# Patient Record
Sex: FEMALE | Race: WHITE | URBAN - METROPOLITAN AREA
[De-identification: names, ages, dates, MRNs, and addresses within clinical notes are randomized per-mention and may not be internally consistent; named-entity substitution may affect disease eponyms.]

---

## 2017-01-04 ENCOUNTER — VIRTUAL VISIT (OUTPATIENT)
Dept: EDUCATION SERVICES | Facility: CLINIC | Age: 67
End: 2017-01-04
Payer: MEDICARE

## 2017-01-04 ENCOUNTER — TELEPHONE (OUTPATIENT)
Dept: EDUCATION SERVICES | Facility: CLINIC | Age: 67
End: 2017-01-04

## 2017-01-04 PROCEDURE — 99207 ZZC NO BILLABLE SERVICE THIS VISIT: CPT

## 2017-01-04 NOTE — TELEPHONE ENCOUNTER
Patient called in and requested a call back to review blood glucose values.    Keiry Marcus RN  BSN CDE      Call out to patient.  She reports that she is now cancer free as there was only a small amount of cancer and it was removed.  She does need to see a a heart doctor however.  She reports that she is home and doing fine.  Trying to walk 4 times per day.  Current insulin doses are Toujeo 0-0-0-94 and Humalog 25-25-25-0.  Reports her BG values as:    12/29  224, ---,   206,---  12/30  188, 165, 115, ---  12/31  142, 180, 202, ---  1/1      202, 226, ---,---  1/2      ---,   ----, 188,---  1/3      ---, ----,   ----,  ---  1/4      ---,---,     255,---   Patient reports she ate a large meatloaf sandwich at 12:00 .    50% of patient blood glucose values are 200 or above.  Recommend to increase patient Humalog dose up to 27-27-27-0 and Toujeo 0-0-0-96.  Patient verbalized understanding and will call in her BG values for review again next week.    Keiry CORONELN CDE

## 2017-01-05 ENCOUNTER — TELEPHONE (OUTPATIENT)
Dept: FAMILY MEDICINE | Facility: CLINIC | Age: 67
End: 2017-01-05

## 2017-01-05 ENCOUNTER — TELEPHONE (OUTPATIENT)
Dept: ONCOLOGY | Facility: CLINIC | Age: 67
End: 2017-01-05

## 2017-01-05 DIAGNOSIS — F41.9 ANXIETY: Primary | ICD-10-CM

## 2017-01-05 RX ORDER — LORAZEPAM 0.5 MG/1
0.5 TABLET ORAL EVERY 8 HOURS PRN
Qty: 20 TABLET | Refills: 0 | Status: SHIPPED | OUTPATIENT
Start: 2017-01-05 | End: 2017-01-16

## 2017-01-05 NOTE — TELEPHONE ENCOUNTER
"Clinic Action Needed:No  Reason for Call:\"I had a total hysterectomy a week ago because of cancer\".  Caller is reporting that she is having high anxiety and wanted to speak to provider about getting medicaiton to \"take the edge off\". Paged on call provider for [facility] to speak to caller at [phone].  Rotating resident pager was paged via [facility] page  at 4:16 pm.     Routed to: Not routed.    Rema Wick RN  [facility]        "

## 2017-01-05 NOTE — TELEPHONE ENCOUNTER
Reason for call: Medication   If this is a refill request, has the caller requested the refill from the pharmacy already? No  Will the patient be using a [facility]? No  Name of the pharmacy and phone number for the current request: [id number] [place] [address]    Name of the medication requested: na    Other request: Pt is 1 week post op/Hysterectomy and is having anxiety issues. Would like something to help with this. Please call with next steps.    Phone Number Pt can be reached at: Home number on file [phone] (home)  Best Time: anytime  Can we leave a detailed message on this number? YES

## 2017-01-05 NOTE — TELEPHONE ENCOUNTER
"Patient is 1 week post op/Hysterectomy and is having anxiety issues. Would like something to help with anxiety.   Patient called [facility] on 1/4. See note below. No further documentation noted. Unknown if on call provider responded.     Routing to provider to please advise.  Herberth Maki RN    Per note from 1/4:  \"Clinic Action Needed:No  Reason for Call:\"I had a total hysterectomy a week ago because of cancer\".  Caller is reporting that she is having high anxiety and wanted to speak to provider about getting medicaiton to \"take the edge off\". Paged on call provider for [facility] to speak to caller at [phone].  Rotating resident pager was paged via [facility] page  at 4:16 pm.     Routed to: Not routed.    Rema Wick RN  [facility]\"      "

## 2017-01-06 ENCOUNTER — TRANSFERRED RECORDS (OUTPATIENT)
Dept: HEALTH INFORMATION MANAGEMENT | Facility: CLINIC | Age: 67
End: 2017-01-06

## 2017-01-06 NOTE — TELEPHONE ENCOUNTER
Pt states spoke to psychiatrist and was told to take Benadryl.  FYI to .  Clementine Figueredo RN

## 2017-01-06 NOTE — TELEPHONE ENCOUNTER
This is a patient with schizophrenia and I am uncomfortable making treatment decisions regarding mental health issues for this patient. I can provide some emergency ativan for anxiety but this is not a suitable long term plan for this patient and I request that her psychiatrist  Be notified of this situation and help deal with alternative choices for treatment.    See prescription     Mateus Roberson MD

## 2017-01-06 NOTE — TELEPHONE ENCOUNTER
Lets please notify patient of this information. 25-50 milligrams 2-3 times a day liang Roberson MD

## 2017-01-09 NOTE — TELEPHONE ENCOUNTER
This was just an FYI to Dr. Roberson.   Psychiatry already addressed this per note below    Harshal Hayward RN

## 2017-01-09 NOTE — TELEPHONE ENCOUNTER
Ativan prescription faxed to [facility] at [phone].  Camila Rudolph,

## 2017-01-10 ENCOUNTER — TELEPHONE (OUTPATIENT)
Dept: FAMILY MEDICINE | Facility: CLINIC | Age: 67
End: 2017-01-10

## 2017-01-10 ENCOUNTER — TELEPHONE (OUTPATIENT)
Dept: ONCOLOGY | Facility: CLINIC | Age: 67
End: 2017-01-10

## 2017-01-10 NOTE — TELEPHONE ENCOUNTER
Pt called our clinic stating that fell out of bed on Saturday and developed severe abdominal pain. She was evaluated in ED and was diagnosed with UTI. She is on abx currently and is taking pain meds. Today she is having increased abdominal pain. RN discussed these symptoms with Dr Cerda and she advised pt to go to ED to be reevaluated. Pt was reluctant to do that, states the roads are bad and she already been to ED. RN reinstated that we cannot help her over the phone and she needs to be seen and evaluated.

## 2017-01-10 NOTE — TELEPHONE ENCOUNTER
Reason for Call:  Medication or medication refill:    Do you use a [facility]?  Name of the pharmacy and phone number for the current request:  Patient will  [facility]    Name of the medication requested: Norco that was prescribed yesterday by ED    Other request: Patient states that she was seen in ED yesterday due to so much pain. Patient was seen with home care nurse and was advised to go to the Hospital. Patient refuses to go but daughter insisting on her going.     Can we leave a detailed message on this number? YES    Phone number patient can be reached at: Home number on file [phone] (home)    Best Time: now    Call taken on 1/10/2017 at 11:56 AM by Michelle Quinn

## 2017-01-10 NOTE — TELEPHONE ENCOUNTER
"Noted that patient had also asked oncology for a refill but was advised to go to ED for severe pain    Call Documentation      Rose John RN at 1/10/2017  8:39 AM      Status: Signed         Expand All Collapse All    Pt called our clinic stating that fell out of bed on Saturday and developed severe abdominal pain. She was evaluated in ED and was diagnosed with UTI. She is on abx currently and is taking pain meds. Today she is having increased abdominal pain. RN discussed these symptoms with Dr Cerda and she advised pt to go to ED to be reevaluated. Pt was reluctant to do that, states the roads are bad and she already been to ED. RN reinstated that we cannot help her over the phone and she needs to be seen and evaluated.            Marina Valdez is a 66 year old female who calls with severe pain in her left lower abd    NURSING ASSESSMENT:  Description:  Per patient, she developed left lower abd pain after falling out of bed after a violent dream over the weekend. She was seen at [place] ED and Diagnosed with a UTI and sent home with abx. Per patient, a CT was completed and was unremarkable. She does not have pain when sitting/lying still but the pain is severe when she stands up/moves. Pain med is ineffective. She is unable to come in for an appointment d/t \"pain is so severe I cannot even get into a car.\"  Onset/duration:  Over the weekend  Precip. factors:    Associated symptoms:  See above  Improves/worsens symptoms:    Pain scale (0-10)   10/10  LMP/preg/breast feeding:    Last exam/Treatment:  11/11/16  Allergies:   Allergies   Allergen Reactions     Other [Seasonal Allergies]      Hayfever       MEDICATIONS:   Taking medication(s) as prescribed? Yes  Taking over the counter medication(s?) N/A  Any medication side effects? No significant side effects    Any barriers to taking medication(s) as prescribed?  No  Medication(s) improving/managing symptoms?  No  Medication reconciliation completed: " N/A      NURSING PLAN: Nursing advice to patient to go to ED    RECOMMENDED DISPOSITION:  To ED, another person to drive - or call an ambulance  Will comply with recommendation: Yes  If further questions/concerns or if symptoms do not improve, worsen or new symptoms develop, call your PCP or [facility] as soon as possible.      Guideline used:  Telephone Triage Protocols for Nurses, Fourth Edition, Jessica Hayward RN

## 2017-01-12 NOTE — TELEPHONE ENCOUNTER
Reason for call: Symptom   Symptom or request: Severe pain in left lower abdomin/when standing    Duration (how long have symptoms been present): Since 01/07/2017  Have you been treated for this before? Yes    Additional comments: Cannot drive to come in would like to discontinue Cephalexing 500mg. Patient would like something else to take care of the UTI.    Phone Number Pt can be reached at: Home number on file [phone] (home)  Best Time: TRIAGE - RN  Can we leave a detailed message on this number? YES

## 2017-01-12 NOTE — TELEPHONE ENCOUNTER
"Spoke with patient.   She stated that she ended up going to [facility] ED but her pain had gone away when she was there so she was sent home.  She is back home now and continues to be in severe pain.  Noted that she has an appointment scheduled for today with Dr. Roberson  Patient stated that she believes she was put on the wrong abx for her UTI which is making her symptoms worse \"I have a UTI and a bruised hip.\"  She stated that she is in severe pain and unable to get to clinic, \"I would have to take an ambulance.\"  Patient stated that the pain is so bad that she is unable to get up.  She seemed to want something done over the phone. Explained to her that she needs further assessment in person as her symptoms are severe   Patient continued to yell on the phone, \"are you stupid lady?\"  Patient's daughter got on the phone and agreed that patient needs to go back to the ED.  Daughter will ask the ED to admit her d/t severe pain   Harshal Hayward RN    "

## 2017-01-12 NOTE — TELEPHONE ENCOUNTER
Patient's other daughter, Mamta, called back to ask if patient can come in for her 6:30Pm appointment today for severe pain.    Per Dr. Roberson: patient recently had surgery. If pain is severe, she needs to go to ED    Mamta updated.    Harshal Hayward RN

## 2017-01-12 NOTE — TELEPHONE ENCOUNTER
Reason for Call:  Other call back    Detailed comments:  Daughter calling. Is there any way she can be seen sooner than 6:30 pm. Marina is in severe pain.     Phone Number Patient can be reached at: Cell number on file:    No relevant phone numbers on file.       Best Time: any    Can we leave a detailed message on this number? YES    Call taken on 1/12/2017 at 11:23 AM by Daisy Ramos

## 2017-01-14 ENCOUNTER — TELEPHONE (OUTPATIENT)
Dept: FAMILY MEDICINE | Facility: CLINIC | Age: 67
End: 2017-01-14

## 2017-01-14 NOTE — TELEPHONE ENCOUNTER
Pt calling wanting to inform Dr. Roberson she is currently hospitalized and has been taken off her insulin.     Reason for call: Other   Patient called regarding (reason for call): call back  Additional comments:   Phone Number Pt can be reached at: Home number on file [phone] (home)  Best Time:   Can we leave a detailed message on this number? YES  Jessie Busch  Central Scheduling

## 2017-01-16 ENCOUNTER — NURSING HOME VISIT (OUTPATIENT)
Dept: GERIATRICS | Facility: CLINIC | Age: 67
End: 2017-01-16
Payer: MEDICARE

## 2017-01-16 VITALS
DIASTOLIC BLOOD PRESSURE: 68 MMHG | HEART RATE: 78 BPM | OXYGEN SATURATION: 97 % | SYSTOLIC BLOOD PRESSURE: 98 MMHG | RESPIRATION RATE: 16 BRPM | TEMPERATURE: 97.9 F | BODY MASS INDEX: 53.43 KG/M2 | WEIGHT: 293 LBS

## 2017-01-16 DIAGNOSIS — M25.552 HIP PAIN, LEFT: Primary | ICD-10-CM

## 2017-01-16 DIAGNOSIS — E78.5 HYPERLIPIDEMIA, UNSPECIFIED HYPERLIPIDEMIA TYPE: ICD-10-CM

## 2017-01-16 DIAGNOSIS — E11.22 TYPE 2 DIABETES MELLITUS WITH STAGE 3 CHRONIC KIDNEY DISEASE, WITH LONG-TERM CURRENT USE OF INSULIN (H): ICD-10-CM

## 2017-01-16 DIAGNOSIS — N18.30 CKD (CHRONIC KIDNEY DISEASE) STAGE 3, GFR 30-59 ML/MIN (H): ICD-10-CM

## 2017-01-16 DIAGNOSIS — E66.01 MORBID OBESITY DUE TO EXCESS CALORIES (H): ICD-10-CM

## 2017-01-16 DIAGNOSIS — I10 BENIGN ESSENTIAL HYPERTENSION: ICD-10-CM

## 2017-01-16 DIAGNOSIS — G47.00 INSOMNIA, UNSPECIFIED TYPE: ICD-10-CM

## 2017-01-16 DIAGNOSIS — F20.9 SCHIZOPHRENIA, UNSPECIFIED TYPE (H): ICD-10-CM

## 2017-01-16 DIAGNOSIS — N39.0 URINARY TRACT INFECTION, SITE UNSPECIFIED: ICD-10-CM

## 2017-01-16 DIAGNOSIS — Z79.4 TYPE 2 DIABETES MELLITUS WITH STAGE 3 CHRONIC KIDNEY DISEASE, WITH LONG-TERM CURRENT USE OF INSULIN (H): ICD-10-CM

## 2017-01-16 DIAGNOSIS — A60.00 RECURRENT GENITAL HSV (HERPES SIMPLEX VIRUS) INFECTION: ICD-10-CM

## 2017-01-16 DIAGNOSIS — R06.00 DYSPNEA, UNSPECIFIED TYPE: ICD-10-CM

## 2017-01-16 DIAGNOSIS — N18.30 TYPE 2 DIABETES MELLITUS WITH STAGE 3 CHRONIC KIDNEY DISEASE, WITH LONG-TERM CURRENT USE OF INSULIN (H): ICD-10-CM

## 2017-01-16 DIAGNOSIS — Z90.710 HISTORY OF HYSTERECTOMY: ICD-10-CM

## 2017-01-16 DIAGNOSIS — G47.33 OBSTRUCTIVE SLEEP APNEA: ICD-10-CM

## 2017-01-16 PROCEDURE — 99207 ZZC CDG-CORRECTLY CODED, REVIEWED AND AGREE: CPT | Performed by: NURSE PRACTITIONER

## 2017-01-16 PROCEDURE — 99310 SBSQ NF CARE HIGH MDM 45: CPT | Performed by: NURSE PRACTITIONER

## 2017-01-16 RX ORDER — CEPHALEXIN 500 MG/1
500 CAPSULE ORAL 4 TIMES DAILY
COMMUNITY
End: 2017-02-01

## 2017-01-16 RX ORDER — IBUPROFEN 400 MG/1
400 TABLET, FILM COATED ORAL 3 TIMES DAILY PRN
COMMUNITY
End: 2017-02-01

## 2017-01-16 RX ORDER — ACETAMINOPHEN 500 MG
500 TABLET ORAL EVERY 4 HOURS PRN
COMMUNITY
End: 2017-01-16 | Stop reason: ALTCHOICE

## 2017-01-16 RX ORDER — HYDROCODONE BITARTRATE AND ACETAMINOPHEN 5; 325 MG/1; MG/1
1-2 TABLET ORAL EVERY 4 HOURS PRN
COMMUNITY
End: 2017-02-08

## 2017-01-16 RX ORDER — DIPHENHYDRAMINE HCL 25 MG
25 TABLET ORAL AT BEDTIME
COMMUNITY
End: 2019-07-12

## 2017-01-16 RX ORDER — ZIPRASIDONE HYDROCHLORIDE 80 MG/1
160 CAPSULE ORAL EVERY MORNING
COMMUNITY
End: 2019-07-16

## 2017-01-16 RX ORDER — AMOXICILLIN 250 MG
2 CAPSULE ORAL 2 TIMES DAILY PRN
COMMUNITY
End: 2019-07-15

## 2017-01-16 NOTE — PROGRESS NOTES
[facility]  PRIMARY CARE PROVIDER AND CLINIC:  Huigwen Mateus [facility] [address] [facility] / [place]  Chief Complaint   Patient presents with     Hospital F/U     HPI:    Marina Valdez is a 66 year old  (1950),admitted to the [facility] from [facility] .  Hospital stay 1/12/2017 through 1/14/2017.  Admitted to this facility for  rehab, medical management and nursing care. Hospital course per review of progress notes:    This is a 66-year-old female, with a past medical history significant for type 2 diabetes mellitus, hyperlipidemia, hypertension, schizophrenia, morbid obesity, obstructive sleep apnea and chronic kidney disease stage III, who was admitted to [facility] with left hip and groin pain after a fall out of bed when having a bad dream. Of note, was evaluated in the ED on 1/9/17 and 1/10/17 for the same left hip and groin pain, but declined placement.During this hospitalization, a left hip MRI revealed no acute findings. Previous pelvic CT on 1/10/17 revealed no acute findings. Treated for positive urinalysis with Cephalexin on 1/9/17. Discharged to TCU for ongoing physical rehabilitation.     Current issues are:        Complains of left hip pain. 2/10 at rest and 10/10 with activity. Questions if pain medication can be adjusted. Lives in an apartment on the 2nd floor with no elevator. Was independent with ambulation prior to hospitalization. Denies shortness of breath, chest pain or abdominal pain.    CODE STATUS/ADVANCE DIRECTIVES DISCUSSION:   CPR/Full code   Patient's living condition: lives alone    ALLERGIES:Other  PAST MEDICAL HISTORY:  has a past medical history of Diabetes; HTN (hypertension); Schizophrenia (H); CORDELL (obstructive sleep apnea); Bipolar affect, depressed (H); Endometrial cancer (H); Morbid obesity due to excess calories (H); and Renal disease.  PAST SURGICAL HISTORY:  has past surgical history that  includes Oophorectomy; laparoscopy; Wrist surgery; cataract; DaVINCI hysterectomy total, bilateral salpingo-oophorectomy, combined (N/A, 12/29/2016); and Cystoscopy (N/A, 12/29/2016).  FAMILY HISTORY: family history includes Blood Disease in her father; Breast Cancer in her paternal grandmother and sister; CEREBROVASCULAR DISEASE in her maternal grandmother; DIABETES in her mother; HEART DISEASE in her brother, maternal grandfather, maternal grandmother, and mother; Hypertension in her father and mother; Kidney Cancer in her daughter; Ovarian Cancer in her paternal aunt; Psychotic Disorder in her mother; Uterine Cancer in her daughter and sister.  SOCIAL HISTORY:  reports that she quit smoking about 27 years ago. Her smoking use included Cigarettes. She smoked 1.00 pack per day for 0 years. She has never used smokeless tobacco. She reports that she does not drink alcohol or use illicit drugs.    Post Discharge Medication Reconciliation Status: discharge medications reconciled and changed, per note/orders (see AVS).  Current Outpatient Prescriptions   Medication Sig Dispense Refill     diphenhydrAMINE (BENADRYL) 25 MG tablet Take 25 mg by mouth At Bedtime And 25 mg nightly as needed       cholecalciferol 1000 UNITS TABS Take 1 tablet by mouth daily       ziprasidone (GEODON) 80 MG capsule Take 160 mg by mouth every morning And 80 mg daily at 10 AM, and 80 mg nightly as needed       psyllium 0.52 G capsule Take 1 capsule by mouth daily       acetaminophen (TYLENOL) 500 MG tablet Take 500 mg by mouth every 4 hours as needed for pain       cephALEXin (KEFLEX) 500 MG capsule Take 500 mg by mouth 4 times daily       HYDROcodone-acetaminophen (NORCO) 5-325 MG per tablet Take 1-2 tablets by mouth every 4 hours as needed for moderate to severe pain Give 1 tablet for pain 4-7 and give 2 tablets for pain 8-10       ibuprofen (ADVIL/MOTRIN) 400 MG tablet Take 400 mg by mouth 3 times daily as needed for pain       senna-docusate  (SENOKOT-S;PERICOLACE) 8.6-50 MG per tablet Take 2 tablets by mouth 2 times daily as needed for constipation       insulin lispro (HUMALOG KWIKPEN) 100 UNIT/ML injection Inject 15 Units Subcutaneous 3 times daily (before meals)       insulin glargine U-300 (TOUJEO SOLOSTAR) 300 UNIT/ML injection Inject 94 Units Subcutaneous At Bedtime       ONE TOUCH VERIO IQ test strip TEST THREE TIMES DAILY OR AS DIRECTED 100 strip 9     valsartan-hydrochlorothiazide (DIOVAN-HCT) 160-25 MG per tablet TAKE 1 TABLET BY MOUTH DAILY 90 tablet 1     B-D U/F 31G X 8 MM insulin pen needle USE FOUR TIMES DAILY AS DIRECTED WITH LANTUS AND PREMEAL INSULIN 200 each 3     blood glucose monitoring (ONE TOUCH DELICA) lancets Use to test blood sugars 3 times daily or as directed. Qty of 1 box= 100 lancets 1 Box 1     order for DME Equipment being ordered: corset to prevent compulsive skin picking of abdomen 1 Device 0     order for DME Equipment being ordered: Lift Chair 1 each 0     valACYclovir (VALTREX) 500 MG tablet TAKE ONE TABLET BY MOUTH ONE TIME DAILY 90 tablet 1     pravastatin (PRAVACHOL) 40 MG tablet TAKE ONE TABLET BY MOUTH ONE TIME DAILY 90 tablet 3     nystatin (MYCOSTATIN) 383693 UNIT/GM POWD Apply 1 g topically 3 times daily as needed 120 g 3     aspirin 81 MG tablet Take 1 tablet (81 mg) by mouth daily 100 tablet 3     ORDER FOR DME Equipment being ordered: CPAP machine set at 15 pressure, heated humidifier, supplies: mask and tubing ( supplies) 1 Device 1     ORDER FOR DME Equipment being ordered: CPAP and associated supplies and tubing 1 Device 0     PAROXETINE HCL 40 MG OR TABS Take 80 mg by mouth daily. 80mg=2 tablets.          ROS:  4 point ROS including Respiratory, CV, GI and , other than that noted in the HPI,  is negative    Exam:  BP 98/68 mmHg  Pulse 78  Temp(Src) 97.9  F (36.6  C)  Resp 16  Wt 321 lb 1.6 oz (145.65 kg)  SpO2 97%  LMP 03/24/2010  GENERAL APPEARANCE:  Alert, in no distress  ENT:  Mouth and  posterior oropharynx normal, moist mucous membranes  EYES:  EOM, conjunctivae, lids, pupils and irises normal  RESP:  respiratory effort and palpation of chest normal, lungs clear to auscultation , no respiratory distress  CV:  Palpation and auscultation of heart done , regular rate and rhythm, no murmur, rub, or gallop  ABDOMEN:  normal bowel sounds, soft, nontender, no hepatosplenomegaly or other masses  M/S:   Active movement of bilateral upper and lower extremities.  SKIN:  Inspection of skin and subcutaneous tissue baseline, Palpation of skin and subcutaneous tissue baseline. Abdominal laparoscopic incisions covered with dressing.  NEURO:   Cranial nerves 2-12 are normal tested and grossly at patient's baseline  PSYCH:  affect and mood normal    Lab/Diagnostic data:  WBC      9.3   12/19/2016  RBC     4.60   12/19/2016  HGB     14.1   12/19/2016  HCT     43.0   12/19/2016  MCV       94   12/19/2016  MCH     30.7   12/19/2016  MCHC     32.8   12/19/2016  RDW     14.5   12/19/2016  PLT      150   12/19/2016    NA      137   11/11/2016   POTASSIUM      3.7   11/11/2016  DEBBIE      9.6   11/11/2016  CO2       24   11/11/2016  BUN       18   11/11/2016  CR     1.31   11/11/2016  GLC      245   11/11/2016    ASSESSMENT/PLAN:  Left Hip Pain. Secondary to fall out of bed. MRI and CT negative for acute findings. Hydrocodone-Acetaminophen and Ibuprofen ordered for pain control. Monitor Ibuprofen use carefully with CKD. Recommended patient use 2 Hydrocodone-Acetaminophen tablets if pain 10/10 as has previously been using just 1 tablet. Physical and Occupational Therapy ordered for deconditioning.     Urinary Tract Infection. No urine culture available to review. Continue Cephalexin as ordered for a total of 10 days of treatment. Last date to be administered 1/19/17.     Grade I Endometrial Adenocarcinoma S/P Robotic Total Laparoscopic Hysterectomy, Left Salpingo-oophorectomy, Lysis of Adhesions and Cystoscopy on 12/29/16.  Follow-up with Dr. Cerda on 1/24/17 as scheduled.    Type 2 Diabetes Mellitus. Last A1C 8.7 on 1/10/17. Continue Glargine and Lispro as ordered. Monitor accuchecks while in TCU and adjust treatment accordingly.     Dyspnea. Constant. Patient reports this has been going on for some time. May be a component of deconditioning. Seen by  at [facility] on 1/6/17. Dietary changes, exercise and an echocardiogram were recommended with follow-up in 2 months. Needs to re-schedule echocardiogram outpatient.    Hyperlipidemia/Hypertension. Monitor blood pressure daily. Continue Valsartan-Hydrochlorothiazide and Pravastatin as ordered.    Schizophrenia. Followed by Dr. Juanjo Morrison at [facility]. Continue Ziprasidone as ordered. Also on Paroxetine.     Morbid Obesity. Last BMI on file 55.12 on 12/19/16. Dietary to follow while in TCU.     Recurrent Genital HSV. Continue Valacyclovir as ordered.    Obstructive Sleep Apnea. Continue CPAP at home settings.    Chronic Kidney Disease, Stage III. Baseline Creatinine low 1s. Last Creatinine 1.25 on 1/10/17. Repeat BMP to ensure stability.      Insomnia. Continue Diphenhydramine as ordered for now. May benefit from alternate medication due to risk of falls.     Information reviewed:  Medications, vital signs, orders, nursing notes, problem list, hospital information. Total time spent with patient visit was 45 min including patient visit and review of past records. Greater than 50% of total time spent with counseling and coordinating care.    Electronically signed by:  DENNY Rojo CNP

## 2017-01-17 VITALS
HEART RATE: 94 BPM | TEMPERATURE: 98.1 F | DIASTOLIC BLOOD PRESSURE: 76 MMHG | SYSTOLIC BLOOD PRESSURE: 119 MMHG | OXYGEN SATURATION: 94 % | RESPIRATION RATE: 18 BRPM

## 2017-01-18 ENCOUNTER — NURSING HOME VISIT (OUTPATIENT)
Dept: GERIATRICS | Facility: CLINIC | Age: 67
End: 2017-01-18
Payer: MEDICARE

## 2017-01-18 DIAGNOSIS — Z79.4 TYPE 2 DIABETES MELLITUS WITH STAGE 3 CHRONIC KIDNEY DISEASE, WITH LONG-TERM CURRENT USE OF INSULIN (H): ICD-10-CM

## 2017-01-18 DIAGNOSIS — N18.30 TYPE 2 DIABETES MELLITUS WITH STAGE 3 CHRONIC KIDNEY DISEASE, WITH LONG-TERM CURRENT USE OF INSULIN (H): ICD-10-CM

## 2017-01-18 DIAGNOSIS — E78.5 HYPERLIPIDEMIA, UNSPECIFIED HYPERLIPIDEMIA TYPE: ICD-10-CM

## 2017-01-18 DIAGNOSIS — N18.30 CKD (CHRONIC KIDNEY DISEASE) STAGE 3, GFR 30-59 ML/MIN (H): ICD-10-CM

## 2017-01-18 DIAGNOSIS — G47.33 OSA (OBSTRUCTIVE SLEEP APNEA): ICD-10-CM

## 2017-01-18 DIAGNOSIS — R53.81 PHYSICAL DECONDITIONING: ICD-10-CM

## 2017-01-18 DIAGNOSIS — N39.0 URINARY TRACT INFECTION WITHOUT HEMATURIA, SITE UNSPECIFIED: ICD-10-CM

## 2017-01-18 DIAGNOSIS — E11.22 TYPE 2 DIABETES MELLITUS WITH STAGE 3 CHRONIC KIDNEY DISEASE, WITH LONG-TERM CURRENT USE OF INSULIN (H): ICD-10-CM

## 2017-01-18 DIAGNOSIS — C54.1 ENDOMETRIAL CANCER (H): ICD-10-CM

## 2017-01-18 DIAGNOSIS — F20.9 SCHIZOPHRENIA, UNSPECIFIED TYPE (H): ICD-10-CM

## 2017-01-18 DIAGNOSIS — A60.00 RECURRENT GENITAL HSV (HERPES SIMPLEX VIRUS) INFECTION: ICD-10-CM

## 2017-01-18 DIAGNOSIS — I10 BENIGN ESSENTIAL HYPERTENSION: ICD-10-CM

## 2017-01-18 DIAGNOSIS — R10.32 GROIN PAIN, LEFT: Primary | ICD-10-CM

## 2017-01-18 PROCEDURE — 99306 1ST NF CARE HIGH MDM 50: CPT | Performed by: INTERNAL MEDICINE

## 2017-01-18 PROCEDURE — 99207 ZZC CDG-CORRECTLY CODED, REVIEWED AND AGREE: CPT | Performed by: INTERNAL MEDICINE

## 2017-01-18 NOTE — PROGRESS NOTES
"[facility]  INITIAL VISIT NOTE  January 18, 2017    PRIMARY CARE PROVIDER AND CLINIC:  Mateus Roberson [facility] [address] [facility] / [place]IS*    Chief Complaint   Patient presents with     Hospital F/U       HPI:    Marina Valdez is a 66 year old  (1950) female who was seen at [facility] on January 18, 2017 for an initial visit. Medical history is notable for schizophrenia, DM II, HTN, CORDELL and endometrial cancer s/p ALFONSO/BSO (12/29/16). She was hospitalized at [facility] from 1/12/17 to 1/14/17 where she presented with left hip and groin pain. She was seen in the ER on 1/9 and 1/10 for same chief complaint. CT and MRI both negative for acute pathology. She was admitted to this facility for medical management and rehab.     Today, Ms. Valdez is seen in her room. She tells me the pain is getting better. She is very happy about this. Says she still needs some \"pain pills\", but that when they wear off the pain is much less than it had been. No chest pain or dyspnea. No abdominal pain. Says she is sleeping well and points to her CPAP. Working with therapies.     CODE STATUS:   CPR/Full code     ALLERGIES:     Allergies   Allergen Reactions     Other [Seasonal Allergies]      Hayfever       PAST MEDICAL HISTORY:   Past Medical History   Diagnosis Date     Diabetes      HTN (hypertension)      Schizophrenia (H)      CORDELL (obstructive sleep apnea)      uses CPAP     Bipolar affect, depressed (H)      Endometrial cancer (H)      Morbid obesity due to excess calories (H)      Renal disease        PAST SURGICAL HISTORY:   Past Surgical History   Procedure Laterality Date     Oophorectomy       right     Laparoscopy       for endometriosis     Wrist surgery       right     Cataract       bilateral     Davinci hysterectomy total, bilateral salpingo-oophorectomy, combined N/A 12/29/2016     Procedure: COMBINED DAVINCI HYSTERECTOMY TOTAL, SALPINGO-OOPHORECTOMY;  Surgeon: " Franca Rousseau MD;  Location: [facility] OR     Cystoscopy N/A 12/29/2016     Procedure: CYSTOSCOPY;  Surgeon: Franca Rousseau MD;  Location: [facility] OR       FAMILY HISTORY:   Family History   Problem Relation Age of Onset     HEART DISEASE Mother      DIABETES Mother      Hypertension Mother      Psychotic Disorder Mother      schitzophrenia     Hypertension Father      Blood Disease Father      high iron level     HEART DISEASE Maternal Grandmother      CEREBROVASCULAR DISEASE Maternal Grandmother      HEART DISEASE Maternal Grandfather      Breast Cancer Paternal Grandmother      HEART DISEASE Brother      Breast Cancer Sister      Ovarian Cancer Paternal Aunt      Kidney Cancer Daughter      Uterine Cancer Daughter      Uterine Cancer Sister        SOCIAL HISTORY:   Lives alone     MEDICATIONS:  Current Outpatient Prescriptions   Medication Sig Dispense Refill     diphenhydrAMINE (BENADRYL) 25 MG tablet Take 25 mg by mouth At Bedtime And 25 mg nightly as needed       cholecalciferol 1000 UNITS TABS Take 1 tablet by mouth daily       ziprasidone (GEODON) 80 MG capsule Take 160 mg by mouth every morning And 80 mg daily at 10 AM, and 80 mg nightly as needed       psyllium 0.52 G capsule Take 1 capsule by mouth daily       cephALEXin (KEFLEX) 500 MG capsule Take 500 mg by mouth 4 times daily       HYDROcodone-acetaminophen (NORCO) 5-325 MG per tablet Take 1-2 tablets by mouth every 4 hours as needed for moderate to severe pain Give 1 tablet for pain 4-7 and give 2 tablets for pain 8-10       ibuprofen (ADVIL/MOTRIN) 400 MG tablet Take 400 mg by mouth 3 times daily as needed for pain       senna-docusate (SENOKOT-S;PERICOLACE) 8.6-50 MG per tablet Take 2 tablets by mouth 2 times daily as needed for constipation       insulin lispro (HUMALOG KWIKPEN) 100 UNIT/ML injection Inject 15 Units Subcutaneous 3 times daily (before meals)       insulin glargine U-300 (TOUJEO SOLOSTAR) 300 UNIT/ML injection Inject 94  Units Subcutaneous At Bedtime       ONE TOUCH VERIO IQ test strip TEST THREE TIMES DAILY OR AS DIRECTED 100 strip 9     valsartan-hydrochlorothiazide (DIOVAN-HCT) 160-25 MG per tablet TAKE 1 TABLET BY MOUTH DAILY 90 tablet 1     B-D U/F 31G X 8 MM insulin pen needle USE FOUR TIMES DAILY AS DIRECTED WITH LANTUS AND PREMEAL INSULIN 200 each 3     blood glucose monitoring (ONE TOUCH DELICA) lancets Use to test blood sugars 3 times daily or as directed. Qty of 1 box= 100 lancets 1 Box 1     order for DME Equipment being ordered: corset to prevent compulsive skin picking of abdomen 1 Device 0     order for DME Equipment being ordered: Lift Chair 1 each 0     valACYclovir (VALTREX) 500 MG tablet TAKE ONE TABLET BY MOUTH ONE TIME DAILY 90 tablet 1     pravastatin (PRAVACHOL) 40 MG tablet TAKE ONE TABLET BY MOUTH ONE TIME DAILY 90 tablet 3     nystatin (MYCOSTATIN) 897842 UNIT/GM POWD Apply 1 g topically 3 times daily as needed 120 g 3     aspirin 81 MG tablet Take 1 tablet (81 mg) by mouth daily 100 tablet 3     ORDER FOR DME Equipment being ordered: CPAP machine set at 15 pressure, heated humidifier, supplies: mask and tubing ( supplies) 1 Device 1     ORDER FOR DME Equipment being ordered: CPAP and associated supplies and tubing 1 Device 0     PAROXETINE HCL 40 MG OR TABS Take 80 mg by mouth daily. 80mg=2 tablets.          Post Discharge Medication Reconciliation Status: medication reconcilation previously completed during another office visit.    ROS:  10 point ROS neg other than the symptoms noted above in the HPI.    PHYSICAL EXAM:  /76 mmHg  Pulse 94  Temp(Src) 98.1  F (36.7  C)  Resp 18  SpO2 94%  LMP 03/24/2010  Gen: sitting up in bed, alert, cooperative and in no acute distress  HEENT: normocephalic; oropharynx clear  Card: RRR, S1, S2, no murmurs  Resp: lungs clear to auscultation bilaterally, no crackles or wheezes  GI: abdomen soft, not-tender  MSK: normal muscle tone, no LE edema  Neuro: CX II-XII  grossly in tact; ROM in all four extremities grossly in tact  Psych: alert and oriented to self and general situation; normal affect    LABORATORY/IMAGING DATA:  Reviewed as per Epic    ASSESSMENT/PLAN:    Left Hip and Groin Pain, Improving   Imaging negative for acute pathology. Etiology unclear. She reports pain is getting better.   -- analgesia with Norco 5-325 mg 1-2 tabs q4h PRN, ibuprofen 400 mg TID PRN    UTI, Resolving  Speciation unknown. No fevers or urinary sx.   -- completing course of cephalexin (1/19/17)    DM, Type II  Hgb A1c 8.7. Sugars 130s-220s  -- continues on glargine U-300 94 units qhs and lispro 15 units TID AC  -- follow sugars and adjust insulin as needed    Schizophrenia  Follows with psychiatry.   -- continues on paroxetine 80 mg daily and ziprasidone 160 mg qam, 80 mg at 1000 and 80 mg qhs PRN    HTN  SBPs 110s overall  -- continues on valsartan-HCTZ 160-25 mg daily  -- follow BPs and adjust medications as needed    Endometrial Cancer s/p ALFONSO/BSO (12/29/16)  -- ongoing management per gyn    HLD  -- continues on pravastatin 40 mg daily    CORDELL  -- continues on CPAP with home settings    Recurrent Genital HSV  -- continues on valacyclovir 500 mg daily    CKD, Stage III  Baseline Cr low 1s.   -- avoid nephrotoxic meds  -- periodic BMP    Physical Deconditioning  In setting of hospitalization and underlying medical conditions  -- ongoing PT/OT      Electronically signed by:  Madelin Mendiola MD

## 2017-01-28 ENCOUNTER — TELEPHONE (OUTPATIENT)
Dept: GERIATRICS | Facility: CLINIC | Age: 67
End: 2017-01-28

## 2017-01-28 NOTE — TELEPHONE ENCOUNTER
Patient recently completed treatment for UTI with Keflex. Now once again pain, burning, some hematuria. UA positive, UC pending.     PLAN  Since no antbx allergies and CrCl 99 - start Cipro 500 mg PO BID x 3 days. Stop if UC negative or update provider with sensitivities.     Electronically signed by DENNY Berrios GNP     ADDENDUM  At 10:00 AM notified by pharmacy of significant drug interaction between Cipro and patient's antipsychotic.     Will dc Cipro order and instead start Macrobid 100 mg PO BID x 3 days, rest of order unchanged. Pharmacy will contact facility to let them know.     Electronically signed by DENNY Berrios GNP

## 2017-02-01 ENCOUNTER — NURSING HOME VISIT (OUTPATIENT)
Dept: GERIATRICS | Facility: CLINIC | Age: 67
End: 2017-02-01
Payer: MEDICARE

## 2017-02-01 VITALS
TEMPERATURE: 98 F | WEIGHT: 293 LBS | SYSTOLIC BLOOD PRESSURE: 121 MMHG | RESPIRATION RATE: 20 BRPM | OXYGEN SATURATION: 98 % | BODY MASS INDEX: 53.43 KG/M2 | DIASTOLIC BLOOD PRESSURE: 64 MMHG | HEART RATE: 105 BPM

## 2017-02-01 DIAGNOSIS — I10 BENIGN ESSENTIAL HYPERTENSION: ICD-10-CM

## 2017-02-01 DIAGNOSIS — M25.552 HIP PAIN, LEFT: Primary | ICD-10-CM

## 2017-02-01 PROCEDURE — 99309 SBSQ NF CARE MODERATE MDM 30: CPT | Performed by: NURSE PRACTITIONER

## 2017-02-01 PROCEDURE — 99207 ZZC CDG-CORRECTLY CODED, REVIEWED AND AGREE: CPT | Performed by: NURSE PRACTITIONER

## 2017-02-01 RX ORDER — NYSTATIN 100000 [USP'U]/G
POWDER TOPICAL 2 TIMES DAILY
COMMUNITY
End: 2017-03-10

## 2017-02-01 NOTE — PROGRESS NOTES
[facility]    Chief Complaint   Patient presents with     Nursing Home Acute     HPI:    Marina Valdez is a 66 year old  (1950), who is being seen today for an episodic care visit at [facility]. Today's concern is:    Left Hip/Groin Pain. Denies pain. States she has been working with therapies and is able to go up/down stairs. Has a care conference tomorrow and is hoping to return home soon. Upon review of documentation, has utilized Hydrocodone-Acetaminophen 1 time in the past 4 days. Per review of Physical Therapy documentation, ambulating > 200 feet. Tinetti Score 26/28.    Diabetes Mellitus Type 2. Upon review of blood sugars over the past week, range is as follows:    Breakfast: 122-177  Lunch: 182-274  Dinner: 132-186  Bedtime: 120-193 with an episode of 228    Hypertension. Upon review of blood pressure over the past week, systolic range 107-140. Diastolic range from 64-94.    ALLERGIES: Other  Past Medical, Surgical, Family and Social History reviewed and [facility] in [facility].    Current Outpatient Prescriptions   Medication Sig Dispense Refill     nystatin (MYCOSTATIN) 580953 UNIT/GM POWD Apply topically 2 times daily       insulin glargine U-300 (TOUJEO SOLOSTAR) 300 UNIT/ML injection Inject 96 Units Subcutaneous At Bedtime       diphenhydrAMINE (BENADRYL) 25 MG tablet Take 25 mg by mouth At Bedtime And 25 mg nightly as needed       cholecalciferol 1000 UNITS TABS Take 1 tablet by mouth daily       ziprasidone (GEODON) 80 MG capsule Take 160 mg by mouth every morning And 80 mg daily at 10 AM, and 80 mg nightly as needed       HYDROcodone-acetaminophen (NORCO) 5-325 MG per tablet Take 1-2 tablets by mouth every 4 hours as needed for moderate to severe pain Give 1 tablet for pain 4-7 and give 2 tablets for pain 8-10       senna-docusate (SENOKOT-S;PERICOLACE) 8.6-50 MG per tablet Take 2 tablets by mouth 2 times daily as needed for constipation       insulin lispro  (HUMALOG KWIKPEN) 100 UNIT/ML injection Inject 15 Units Subcutaneous 3 times daily (before meals)       ONE TOUCH VERIO IQ test strip TEST THREE TIMES DAILY OR AS DIRECTED 100 strip 9     valsartan-hydrochlorothiazide (DIOVAN-HCT) 160-25 MG per tablet TAKE 1 TABLET BY MOUTH DAILY 90 tablet 1     B-D U/F 31G X 8 MM insulin pen needle USE FOUR TIMES DAILY AS DIRECTED WITH LANTUS AND PREMEAL INSULIN 200 each 3     blood glucose monitoring (ONE TOUCH DELICA) lancets Use to test blood sugars 3 times daily or as directed. Qty of 1 box= 100 lancets 1 Box 1     order for DME Equipment being ordered: corset to prevent compulsive skin picking of abdomen 1 Device 0     order for DME Equipment being ordered: Lift Chair 1 each 0     valACYclovir (VALTREX) 500 MG tablet TAKE ONE TABLET BY MOUTH ONE TIME DAILY 90 tablet 1     pravastatin (PRAVACHOL) 40 MG tablet TAKE ONE TABLET BY MOUTH ONE TIME DAILY 90 tablet 3     aspirin 81 MG tablet Take 1 tablet (81 mg) by mouth daily 100 tablet 3     ORDER FOR DME Equipment being ordered: CPAP machine set at 15 pressure, heated humidifier, supplies: mask and tubing ( supplies) 1 Device 1     ORDER FOR DME Equipment being ordered: CPAP and associated supplies and tubing 1 Device 0     PAROXETINE HCL 40 MG OR TABS Take 80 mg by mouth daily. 80mg=2 tablets.        [DISCONTINUED] insulin glargine U-300 (TOUJEO SOLOSTAR) 300 UNIT/ML injection Inject 94 Units Subcutaneous At Bedtime       Medications reviewed:  Medications reconciled to facility chart and changes were made to reflect current medications as identified as above med list. Below are the changes that were made:   Medications stopped since last EPIC medication reconciliation:   Medications Discontinued During This Encounter   Medication Reason     nystatin (MYCOSTATIN) 331198 UNIT/GM POWD Dose adjustment     cephALEXin (KEFLEX) 500 MG capsule Therapy completed     ibuprofen (ADVIL/MOTRIN) 400 MG tablet Medication Reconciliation Clean  Up     psyllium 0.52 G capsule Medication Reconciliation Clean Up       Medications started since last EPIC medication reconciliation:  Orders Placed This Encounter   Medications     nystatin (MYCOSTATIN) 163277 UNIT/GM POWD     Sig: Apply topically 2 times daily       REVIEW OF SYSTEMS:  4 point ROS including Respiratory, CV, GI and , other than that noted in the HPI,  is negative    Physical Exam:  /64 mmHg  Pulse 105  Temp(Src) 98  F (36.7  C)  Resp 20  Wt 321 lb 1.6 oz (145.65 kg)  SpO2 98%  LMP 03/24/2010  GENERAL APPEARANCE:  Alert, in no distress  ENT:  Mouth and posterior oropharynx normal, moist mucous membranes  EYES:  EOM, conjunctivae, lids, pupils and irises normal  RESP:  respiratory effort and palpation of chest normal, lungs clear to auscultation , no respiratory distress  CV:  Palpation and auscultation of heart done , regular rate and rhythm, no murmur, rub, or gallop  ABDOMEN:  normal bowel sounds, soft, nontender, no hepatosplenomegaly or other masses  M/S:   Active movement of bilateral upper and lower extremities.  SKIN:  Inspection of skin and subcutaneous tissue baseline, Palpation of skin and subcutaneous tissue baseline  NEURO:   Cranial nerves 2-12 are normal tested and grossly at patient's baseline  PSYCH:  affect and mood normal    Recent Labs:      Last Basic Metabolic Panel:  NA      137   11/11/2016   POTASSIUM      3.7   11/11/2016  CHLORIDE       98   11/11/2016  DEBBIE      9.6   11/11/2016  CO2       24   11/11/2016  BUN       18   11/11/2016  CR     1.31   11/11/2016  GLC      245   11/11/2016    WBC      9.3   12/19/2016  RBC     4.60   12/19/2016  HGB     14.1   12/19/2016  HCT     43.0   12/19/2016  MCV       94   12/19/2016  MCH     30.7   12/19/2016  MCHC     32.8   12/19/2016  RDW     14.5   12/19/2016  PLT      150   12/19/2016    Assessment/Plan:  Left Hip Pain, Improving. Secondary to fall out of bed. MRI and CT negative for acute findings.  Hydrocodone-Acetaminophen for pain control. Physical and Occupational Therapy ordered for deconditioning.     Type 2 Diabetes Mellitus. Last A1C 9.4 on 11/11/16. Based upon elevated blood sugars, will increase Glargine to 96 Units. Continue Lispro as ordered.    Hypertension. Blood pressures <140/90. Continue Valsartan-Hydrochlorothiazide as ordered.     Electronically signed by  DENNY Rojo CNP

## 2017-02-03 ENCOUNTER — TELEPHONE (OUTPATIENT)
Dept: ONCOLOGY | Facility: CLINIC | Age: 67
End: 2017-02-03

## 2017-02-03 NOTE — TELEPHONE ENCOUNTER
Called and left message with pt to reschedule post op appt with Dr Cerda.  Pt was in TCU/  Left message to call back.    Usha Noble, RN, BSN

## 2017-02-07 NOTE — TELEPHONE ENCOUNTER
Called pt and left message again to call clinic back to schedule post op with Dr Cerda.    Usha Noble, RN, BSN

## 2017-02-07 NOTE — TELEPHONE ENCOUNTER
Called pt at TCU at [phone] and pt states she hopes to be discharged from TCU later this week and will discuss appt with daughter for ride.  Pt informed that Dr Cerda can see pt in clinic on 2/14 or 2/28.  Pt will call back next week to reschedule.  No further questions at this time.    Usha Noble, RN, BSN

## 2017-02-08 ENCOUNTER — DISCHARGE SUMMARY NURSING HOME (OUTPATIENT)
Dept: GERIATRICS | Facility: CLINIC | Age: 67
End: 2017-02-08
Payer: MEDICARE

## 2017-02-08 VITALS
DIASTOLIC BLOOD PRESSURE: 78 MMHG | RESPIRATION RATE: 18 BRPM | TEMPERATURE: 97.7 F | OXYGEN SATURATION: 97 % | SYSTOLIC BLOOD PRESSURE: 132 MMHG | HEART RATE: 82 BPM

## 2017-02-08 DIAGNOSIS — G47.00 INSOMNIA, UNSPECIFIED TYPE: ICD-10-CM

## 2017-02-08 DIAGNOSIS — Z90.710 HISTORY OF HYSTERECTOMY: ICD-10-CM

## 2017-02-08 DIAGNOSIS — F20.9 SCHIZOPHRENIA, UNSPECIFIED TYPE (H): ICD-10-CM

## 2017-02-08 DIAGNOSIS — G47.33 OBSTRUCTIVE SLEEP APNEA: ICD-10-CM

## 2017-02-08 DIAGNOSIS — M25.552 HIP PAIN, LEFT: Primary | ICD-10-CM

## 2017-02-08 DIAGNOSIS — N18.30 CKD (CHRONIC KIDNEY DISEASE) STAGE 3, GFR 30-59 ML/MIN (H): ICD-10-CM

## 2017-02-08 DIAGNOSIS — R41.89 COGNITIVE IMPAIRMENT: ICD-10-CM

## 2017-02-08 DIAGNOSIS — E78.5 HYPERLIPIDEMIA, UNSPECIFIED HYPERLIPIDEMIA TYPE: ICD-10-CM

## 2017-02-08 DIAGNOSIS — R06.00 DYSPNEA, UNSPECIFIED TYPE: ICD-10-CM

## 2017-02-08 DIAGNOSIS — N39.0 URINARY TRACT INFECTION, SITE UNSPECIFIED: ICD-10-CM

## 2017-02-08 DIAGNOSIS — A60.00 RECURRENT GENITAL HSV (HERPES SIMPLEX VIRUS) INFECTION: ICD-10-CM

## 2017-02-08 DIAGNOSIS — I10 BENIGN ESSENTIAL HYPERTENSION: ICD-10-CM

## 2017-02-08 DIAGNOSIS — E66.01 MORBID OBESITY DUE TO EXCESS CALORIES (H): ICD-10-CM

## 2017-02-08 PROCEDURE — 99207 ZZC CDG-CORRECTLY CODED, REVIEWED AND AGREE: CPT | Performed by: NURSE PRACTITIONER

## 2017-02-08 PROCEDURE — 99316 NF DSCHRG MGMT 30 MIN+: CPT | Performed by: NURSE PRACTITIONER

## 2017-02-08 NOTE — PROGRESS NOTES
[facility] DISCHARGE SUMMARY    PATIENT'S NAME: Marina Valdez  YOB: 1950  MEDICAL RECORD NUMBER:  [id number]    PRIMARY CARE PROVIDER AND CLINIC RESPONSIBLE AFTER TRANSFER: Mateus Roberson [facility] [address] / [place]    CODE STATUS/ADVANCE DIRECTIVES DISCUSSION:   CPR/Full code     Allergies   Allergen Reactions     Other [Seasonal Allergies]      Hayfever       TRANSFERRING PROVIDERS: DENNY Rojo CNP, Madelin Mendiola MD  DATE OF SNF ADMISSION:  January / 14 / 2017  DATE OF SNF (anticipated) DISCHARGE: February / 10 / 2017  DISCHARGE DISPOSITION: FMG Provider   Nursing Facility: [facility]  stay 1/12/2017 to 1/14/2017.     Condition on Discharge:  Improving.  Cognitive Scores: CPT 4.5/5.6 SLUMS 19/30  Equipment: walker    DISCHARGE DIAGNOSIS:   1. Hip pain, left    2. Urinary tract infection, site unspecified    3. History of hysterectomy    4. Dyspnea, unspecified type    5. Uncontrolled type 2 diabetes mellitus with stage 3 chronic kidney disease, with long-term current use of insulin (H)    6. Hyperlipidemia, unspecified hyperlipidemia type    7. Benign essential hypertension    8. Schizophrenia, unspecified type (H)    9. Morbid obesity due to excess calories (H)    10. Recurrent genital HSV (herpes simplex virus) infection    11. Obstructive sleep apnea (on cpap)    12. CKD (chronic kidney disease) stage 3, GFR 30-59 ml/min    13. Insomnia, unspecified type      [facility] Course:  This is a 66-year-old female, with a past medical history significant for type 2 diabetes mellitus, hyperlipidemia, hypertension, schizophrenia, morbid obesity, obstructive sleep apnea and chronic kidney disease stage III, who was admitted to [facility] with left hip and groin pain after a fall out of bed when having a bad dream. Of note, was evaluated in the ED on 1/9/17 and 1/10/17 for the same  left hip and groin pain, but declined placement.During this hospitalization, a left hip MRI revealed no acute findings. Previous pelvic CT on 1/10/17 revealed no acute findings. Treated for positive urinalysis with Cephalexin on 1/9/17. Discharged to TCU for ongoing physical rehabilitation.    During TCU stay, received Physical and Occupational Therapy. Ambulating 180 feet without assistive device and supervision. Ambulates 300 feet with 4WW.      Left Hip Pain. Secondary to fall out of bed. MRI and CT negative for acute findings during hospitalization. Pain resolved during TCU stay. Home Physical and Occupational Therapy ordered for deconditioning.     Urinary Tract Infection. Completeted a 10 day course of Cephalexin on 1/19/17. Complained of UTI symptoms during TCU stay, but urine culture was negative.    Grade I Endometrial Adenocarcinoma S/P Robotic Total Laparoscopic Hysterectomy, Left Salpingo-oophorectomy, Lysis of Adhesions and Cystoscopy on 12/29/16. Follow-up with Dr. Cerda as recommended. Clinic reached out to patient 2/3/17 to schedule follow-up appointment which she will arrange after discharge.    Type 2 Diabetes Mellitus. Last A1C 8.7 on 1/10/17. Glargine increased slightly while in TCU, from 94 U to 96 U. Continue Lispro as ordered. Upon review of blood sugar over the past week, range is as follows:    Breakfast: 105-139  Lunch: 133-229  Dinner: 115-175  Bedtime: 106-156    Dyspnea. Constant. Patient reports this has been going on for some time. May be a component of deconditioning. Seen by  at [facility] on 1/6/17. Dietary changes, exercise and an echocardiogram were recommended with follow-up in 2 months. Needs to re-schedule echocardiogram outpatient.    Hyperlipidemia/Hypertension. Upon review of blood pressure over the past week, systolic range from 108-143. Diastolic range from 63-95. Most <`40/90. Continue Valsartan-Hydrochlorothiazide and Pravastatin as  ordered.    Schizophrenia. Followed by Dr. Juanjo Morrison at [facility]. Continue Ziprasidone as ordered. Also on Paroxetine.     Cognitive Impairment. CPT 4.5/5.6 SLUMS 19/30. Based on CPT score, 24 hour supervision was recommended to daughter and patient by therapies. Patient is resistant to moving to [place] although this has been suggested by PCP. Will discharge home with PT/OT/RN/HA and homemaker through [facility]. Also receives Meals on Wheels. Would recommend further discussion outpatient.    Morbid Obesity. Last BMI on file 55.12 on 12/19/16. Dietary to follow while in TCU.     Recurrent Genital HSV. Continue Valacyclovir as ordered.    Obstructive Sleep Apnea. Continue CPAP at home settings.    Chronic Kidney Disease, Stage III. Baseline Creatinine low 1s. Last Creatinine 1.25 on 1/10/17. Monitor periodically.      Insomnia. Continue Diphenhydramine as ordered. May benefit from alternate medication due to risk of falls.     PAST MEDICAL HISTORY:  has a past medical history of Diabetes; HTN (hypertension); Schizophrenia (H); CORDELL (obstructive sleep apnea); Bipolar affect, depressed (H); Endometrial cancer (H); Morbid obesity due to excess calories (H); and Renal disease.    DISCHARGE MEDICATIONS:  Current Outpatient Prescriptions   Medication Sig Dispense Refill     nystatin (MYCOSTATIN) 683292 UNIT/GM POWD Apply topically 2 times daily       insulin glargine U-300 (TOUJEO SOLOSTAR) 300 UNIT/ML injection Inject 96 Units Subcutaneous At Bedtime       diphenhydrAMINE (BENADRYL) 25 MG tablet Take 25 mg by mouth At Bedtime And 25 mg nightly as needed       cholecalciferol 1000 UNITS TABS Take 1 tablet by mouth daily       ziprasidone (GEODON) 80 MG capsule Take 160 mg by mouth every morning And 80 mg daily at 10 AM, and 80 mg nightly as needed       senna-docusate (SENOKOT-S;PERICOLACE) 8.6-50 MG per tablet Take 2 tablets by mouth 2 times daily as needed for constipation       insulin lispro  (HUMALOG KWIKPEN) 100 UNIT/ML injection Inject 15 Units Subcutaneous 3 times daily (before meals)       ONE TOUCH VERIO IQ test strip TEST THREE TIMES DAILY OR AS DIRECTED 100 strip 9     valsartan-hydrochlorothiazide (DIOVAN-HCT) 160-25 MG per tablet TAKE 1 TABLET BY MOUTH DAILY 90 tablet 1     B-D U/F 31G X 8 MM insulin pen needle USE FOUR TIMES DAILY AS DIRECTED WITH LANTUS AND PREMEAL INSULIN 200 each 3     blood glucose monitoring (ONE TOUCH DELICA) lancets Use to test blood sugars 3 times daily or as directed. Qty of 1 box= 100 lancets 1 Box 1     order for DME Equipment being ordered: corset to prevent compulsive skin picking of abdomen 1 Device 0     order for DME Equipment being ordered: Lift Chair 1 each 0     valACYclovir (VALTREX) 500 MG tablet TAKE ONE TABLET BY MOUTH ONE TIME DAILY 90 tablet 1     pravastatin (PRAVACHOL) 40 MG tablet TAKE ONE TABLET BY MOUTH ONE TIME DAILY 90 tablet 3     aspirin 81 MG tablet Take 1 tablet (81 mg) by mouth daily 100 tablet 3     ORDER FOR DME Equipment being ordered: CPAP machine set at 15 pressure, heated humidifier, supplies: mask and tubing ( supplies) 1 Device 1     ORDER FOR DME Equipment being ordered: CPAP and associated supplies and tubing 1 Device 0     PAROXETINE HCL 40 MG OR TABS Take 80 mg by mouth daily. 80mg=2 tablets.        MEDICATION CHANGES/RATIONALE: See above  Controlled medications sent with patient: not applicable/none     ROS:    4 point ROS including Respiratory, CV, GI and , other than that noted in the HPI,  is negative    Physical Exam:   Vitals: /78 mmHg  Pulse 82  Temp(Src) 97.7  F (36.5  C)  Resp 18  SpO2 97%  LMP 03/24/2010  BMI= There is no weight on file to calculate BMI.  GENERAL APPEARANCE:  Alert, in no distress  ENT:  Mouth and posterior oropharynx normal, moist mucous membranes  EYES:  EOM, conjunctivae, lids, pupils and irises normal  RESP:  respiratory effort and palpation of chest normal, lungs clear to auscultation  , no respiratory distress  CV:  Palpation and auscultation of heart done , regular rate and rhythm, no murmur, rub, or gallop  ABDOMEN:  normal bowel sounds, soft, nontender, no hepatosplenomegaly or other masses  M/S:   Active movement of bilateral upper and lower extremities.  SKIN:  Inspection of skin and subcutaneous tissue baseline, Palpation of skin and subcutaneous tissue baseline.   NEURO:   Cranial nerves 2-12 are normal tested and grossly at patient's baseline  PSYCH:  affect and mood normal    DISCHARGE PLAN:  Occupational Therapy, Physical Therapy, Registered Nurse, Home Health Aide and From:  [facility]  Patient instructed to follow-up with:  PCP in 7 days      [facility] scheduled appointments: None    Pending labs: None  SNF labs     Last Basic Metabolic Panel:  NA      137   11/11/2016   POTASSIUM      3.7   11/11/2016  CHLORIDE       98   11/11/2016  DEBBIE      9.6   11/11/2016  CO2       24   11/11/2016  BUN       18   11/11/2016  CR     1.31   11/11/2016  GLC      245   11/11/2016    WBC      9.3   12/19/2016  RBC     4.60   12/19/2016  HGB     14.1   12/19/2016  HCT     43.0   12/19/2016  MCV       94   12/19/2016  MCH     30.7   12/19/2016  MCHC     32.8   12/19/2016  RDW     14.5   12/19/2016  PLT      150   12/19/2016    Discharge Treatments: See Above    TOTAL DISCHARGE TIME:   Greater than 30 minutes  Electronically signed by:  DENNY Rojo CNP         Documentation of Face-to-Face and Certification for Home Health Services     Patient: Marina Valdez   YOB: 1950  MR Number: [id number]  Today's Date: 2/8/2017    I certify that patient: Marina Valdez is under my care and that I, or a nurse practitioner or physician's assistant working with me, had a face-to-face encounter that meets the physician face-to-face encounter requirements with this patient on: 1/18/17.    This encounter with the patient was in whole, or in part, for the following  medical condition, which is the primary reason for home health care: Left hip/groin pain and physical deconditioning.    I certify that, based on my findings, the following services are medically necessary home health services: Nursing, Occupational Therapy and Physical Therapy.    My clinical findings support the need for the above services because: Nurse is needed: To provide assessment and oversight required in the home to assure adherence to the medical plan due to: diabetes mellitus.., Occupational Therapy Services are needed to assess and treat cognitive ability and address ADL safety due to impairment in cognition and physical deconditioning. and Physical Therapy Services are needed to assess and treat the following functional impairments: gait instability due to physical deconditioning and morbid obesity.    Further, I certify that my clinical findings support that this patient is homebound (i.e. absences from home require considerable and taxing effort and are for medical reasons or [employer] services or infrequently or of short duration when for other reasons) because: Requires assistance of another person or specialized equipment to access medical services because patient: Is unable to operate assistive equipment on their own...    Based on the above findings. I certify that this patient is confined to the home and needs intermittent skilled nursing care, physical therapy and/or speech therapy.  The patient is under my care, and I have initiated the establishment of the plan of care.  This patient will be followed by a physician who will periodically review the plan of care.  Physician/Provider to provide follow up care: Mateus Roberson    Responsible Medicare certified PECOS Physician: Dr. Madelin Mendiola  Physician Signature: See electronic signature associated with these discharge orders.  Date: 2/8/2017

## 2017-02-13 ENCOUNTER — TELEPHONE (OUTPATIENT)
Dept: FAMILY MEDICINE | Facility: CLINIC | Age: 67
End: 2017-02-13

## 2017-02-13 NOTE — TELEPHONE ENCOUNTER
This patient was discharged from [facility] on 2-10-17.    Discharge Diagnosis: Hip pain, left    A follow-up visit has not been scheduled.      Number of ED/ER visits in the last 12 months:  0     Please follow-up with patient.    Camila Rudolph,

## 2017-02-13 NOTE — TELEPHONE ENCOUNTER
"Hospital/TCU/ED for chronic condition Discharge Protocol    \"Hi, my name is Laya Krause, a registered nurse, and I am calling from [facility].  I am calling to follow up and see how things are going for you after your recent emergency visit/hospital/TCU stay.\"    Tell me how you are doing now that you are home?\" good, 2 weeks of home care, starting meals on wheels       Discharge Instructions    \"Let's review your discharge instructions.  What is/are the follow-up recommendations?  Pt. Response: start home care.    \"Has an appointment with your primary care provider been scheduled?\"   No (schedule appointment)- patient states she will call later and make appointment.     \"When you see the provider, I would recommend that you bring your medications with you.\"    Medications    \"Tell me what changed about your medicines when you discharged?\"    Changes to chronic meds?    0-1    \"What questions do you have about your medications?\"    None     New diagnoses of heart failure, COPD, diabetes, or MI?    No     On insulin: \"Did you start on insulin in the hospital or did you have your insulin dose changed?\"  No         Medication reconciliation completed? Yes  Was MTM referral placed (*Make sure to put transitions as reason for referral)?   No    Call Summary    \"What questions or concerns do you have about your recent visit and your follow-up care?\"     none    \"If you have questions or things don't continue to improve, we encourage you contact us through the main clinic number (give number).  Even if the clinic is not open, triage nurses are available 24/7 to help you.     We would like you to know that our clinic has extended hours (provide information).  We also have urgent care (provide details on closest location and hours/contact info)\"      \"Thank you for your time and take care!\"    Laya Krause RN           "

## 2017-02-21 DIAGNOSIS — Z53.9 DIAGNOSIS NOT YET DEFINED: Primary | ICD-10-CM

## 2017-02-21 PROCEDURE — G0180 MD CERTIFICATION HHA PATIENT: HCPCS | Performed by: INTERNAL MEDICINE

## 2017-02-22 ENCOUNTER — TELEPHONE (OUTPATIENT)
Dept: ONCOLOGY | Facility: CLINIC | Age: 67
End: 2017-02-22

## 2017-02-22 NOTE — TELEPHONE ENCOUNTER
Call to pt again about scheduling post op appt with Dr Cerda.  Pt scheduled 3/7/17 at 1 pm and pt verbalized understanding of plan.    Usha Noble, RN, BSN

## 2017-02-25 DIAGNOSIS — A60.00 RECURRENT GENITAL HSV (HERPES SIMPLEX VIRUS) INFECTION: ICD-10-CM

## 2017-02-27 RX ORDER — VALACYCLOVIR HYDROCHLORIDE 500 MG/1
TABLET, FILM COATED ORAL
Qty: 90 TABLET | Refills: 1 | Status: SHIPPED | OUTPATIENT
Start: 2017-02-27 | End: 2017-08-26

## 2017-02-27 NOTE — TELEPHONE ENCOUNTER
valACYclovir (VALTREX) 500 MG tablet     Last Written Prescription Date: 8/1/16  Last Fill Quantity: 90, # refills: 1  Last Office Visit with G, P or [facility] prescribing provider: 11/11/16   Next 5 appointments (look out 90 days)     Mar 07, 2017  1:00 PM CST   Return Visit with Franca Gregory MD   [facility] ([facility])    [facility]  [address]  [facility] [id number] [phone]                   Creatinine   Date Value Ref Range Status   11/11/2016 1.31 (H) 0.52 - 1.04 mg/dL Final

## 2017-02-27 NOTE — TELEPHONE ENCOUNTER
Prescription approved per [facility] Refill Protocol.  Marlin Schreiber RN

## 2017-03-02 ENCOUNTER — TELEPHONE (OUTPATIENT)
Dept: ONCOLOGY | Facility: CLINIC | Age: 67
End: 2017-03-02

## 2017-03-02 NOTE — TELEPHONE ENCOUNTER
Called pt and she states she had to cancel follow up with Dr Cerda for 3/7/17 due to transportation issues.  Pt states she has been having increased discomfort in right buttocks that radiates to right lower abdomen, notices more upon standing.  Denies fevers, n/v, or other concerning symptoms.  Recommended pt follow up with PCP as pt lives near [place] and pt can arrange ride to local PCP.  Pt states she will discuss with her sister about arranging future appt with Gyn Onc NP for visit here and pt will also call back to update writer.    Will update Dr Cerda with pt call.    Usha Noble, RN, BSN

## 2017-03-02 NOTE — TELEPHONE ENCOUNTER
Pt called, wanted to speak with nurse, she said she is having some discomfort after surgery, also unable to get a ride to appointment, best number to reach here is [phone]. Colleen CORNEJO

## 2017-03-02 NOTE — TELEPHONE ENCOUNTER
Attempted contact with pt and busy signal X 2, will continue to contact pt.    Usha Noble RN, BSN

## 2017-03-03 ENCOUNTER — OFFICE VISIT (OUTPATIENT)
Dept: FAMILY MEDICINE | Facility: CLINIC | Age: 67
End: 2017-03-03
Payer: MEDICARE

## 2017-03-03 VITALS
HEART RATE: 126 BPM | DIASTOLIC BLOOD PRESSURE: 82 MMHG | TEMPERATURE: 99 F | WEIGHT: 293 LBS | SYSTOLIC BLOOD PRESSURE: 130 MMHG | BODY MASS INDEX: 54.25 KG/M2 | OXYGEN SATURATION: 92 %

## 2017-03-03 DIAGNOSIS — R10.31 RLQ ABDOMINAL PAIN: Primary | ICD-10-CM

## 2017-03-03 DIAGNOSIS — M79.604 LOW BACK PAIN RADIATING TO RIGHT LEG: ICD-10-CM

## 2017-03-03 DIAGNOSIS — R74.8 ELEVATED ALKALINE PHOSPHATASE LEVEL: ICD-10-CM

## 2017-03-03 DIAGNOSIS — M25.552 HIP PAIN, LEFT: ICD-10-CM

## 2017-03-03 DIAGNOSIS — R74.8 ELEVATED LIVER ENZYMES: ICD-10-CM

## 2017-03-03 DIAGNOSIS — C54.1 ENDOMETRIAL CANCER DETERMINED BY UTERINE BIOPSY (H): ICD-10-CM

## 2017-03-03 DIAGNOSIS — M54.50 LOW BACK PAIN RADIATING TO RIGHT LEG: ICD-10-CM

## 2017-03-03 LAB
ALBUMIN UR-MCNC: NEGATIVE MG/DL
APPEARANCE UR: CLEAR
BACTERIA #/AREA URNS HPF: ABNORMAL /HPF
BASOPHILS # BLD AUTO: 0 10E9/L (ref 0–0.2)
BASOPHILS NFR BLD AUTO: 0.3 %
BILIRUB UR QL STRIP: NEGATIVE
COLOR UR AUTO: YELLOW
DIFFERENTIAL METHOD BLD: NORMAL
EOSINOPHIL # BLD AUTO: 0.2 10E9/L (ref 0–0.7)
EOSINOPHIL NFR BLD AUTO: 2.4 %
ERYTHROCYTE [DISTWIDTH] IN BLOOD BY AUTOMATED COUNT: 14.9 % (ref 10–15)
GLUCOSE UR STRIP-MCNC: NEGATIVE MG/DL
HCT VFR BLD AUTO: 40.6 % (ref 35–47)
HGB BLD-MCNC: 13 G/DL (ref 11.7–15.7)
HGB UR QL STRIP: ABNORMAL
KETONES UR STRIP-MCNC: NEGATIVE MG/DL
LEUKOCYTE ESTERASE UR QL STRIP: NEGATIVE
LYMPHOCYTES # BLD AUTO: 1 10E9/L (ref 0.8–5.3)
LYMPHOCYTES NFR BLD AUTO: 13.1 %
MCH RBC QN AUTO: 30.7 PG (ref 26.5–33)
MCHC RBC AUTO-ENTMCNC: 32 G/DL (ref 31.5–36.5)
MCV RBC AUTO: 96 FL (ref 78–100)
MONOCYTES # BLD AUTO: 0.7 10E9/L (ref 0–1.3)
MONOCYTES NFR BLD AUTO: 9.8 %
NEUTROPHILS # BLD AUTO: 5.5 10E9/L (ref 1.6–8.3)
NEUTROPHILS NFR BLD AUTO: 74.4 %
NITRATE UR QL: NEGATIVE
NON-SQ EPI CELLS #/AREA URNS LPF: ABNORMAL /LPF
PH UR STRIP: 5.5 PH (ref 5–7)
PLATELET # BLD AUTO: 152 10E9/L (ref 150–450)
RBC # BLD AUTO: 4.23 10E12/L (ref 3.8–5.2)
RBC #/AREA URNS AUTO: ABNORMAL /HPF (ref 0–2)
SP GR UR STRIP: 1.01 (ref 1–1.03)
URN SPEC COLLECT METH UR: ABNORMAL
UROBILINOGEN UR STRIP-ACNC: 0.2 EU/DL (ref 0.2–1)
WBC # BLD AUTO: 7.4 10E9/L (ref 4–11)
WBC #/AREA URNS AUTO: ABNORMAL /HPF (ref 0–2)

## 2017-03-03 PROCEDURE — 99214 OFFICE O/P EST MOD 30 MIN: CPT | Performed by: NURSE PRACTITIONER

## 2017-03-03 PROCEDURE — 80053 COMPREHEN METABOLIC PANEL: CPT | Performed by: NURSE PRACTITIONER

## 2017-03-03 PROCEDURE — 81001 URINALYSIS AUTO W/SCOPE: CPT | Performed by: NURSE PRACTITIONER

## 2017-03-03 PROCEDURE — 36415 COLL VENOUS BLD VENIPUNCTURE: CPT | Performed by: NURSE PRACTITIONER

## 2017-03-03 PROCEDURE — 85025 COMPLETE CBC W/AUTO DIFF WBC: CPT | Performed by: NURSE PRACTITIONER

## 2017-03-03 ASSESSMENT — PAIN SCALES - GENERAL: PAINLEVEL: NO PAIN (0)

## 2017-03-03 NOTE — PROGRESS NOTES
SUBJECTIVE:                                                    Marina Valdez is a 66 year old female who presents to clinic today for the following health issues:      Hospital Follow-up Visit:    Hospital/Nursing Home/IP Rehab Facility: [facility]  Date of Admission: January 14, 2017  Date of Discharge: February 10, 2017  Reason(s) for Admission: Fall.  Patient also missed post op appointment due to admission, and is unable to reschedule appointment because of transportation issues            Problems taking medications regularly:  None       Medication changes since discharge: None       Problems adhering to non-medication therapy:  None    Summary of hospitalization:  CareEverywhere information obtained and reviewed  Diagnostic Tests/Treatments reviewed.  Follow up needed: none  Other Healthcare Providers Involved in Patient s Care:         Specialist appointment - needs surgical fu, HH involved  Update since discharge: improved.     Patient missed her follow-up with Gyn/Onc due to transportation issues.  She needs still to have a surgical follow-up. Surgery was laparascopic assisted and per patient she will not need further treatment for cancer.  Patient complains of right lower abdominal pain and radiating to right buttock with standing.  Patient denies constipation, diarrhea, melena, hematochezia, vaginal discharge, dysuria, hematuria.  Patient is taking tylenol with good relief of pain.  Patient was treated for a UTI on 1/19/17.    Patient fell out of bed and had severe left hip pain.  MRI was negative.  She went to rehab for a month due to difficulty ambulating.  Patient is no longer having pain to her left hip.  Patient is doing OT/PT and Meals on Wheels coming to her home.      Patient reports her blood sugars have been 130-150.  She is on humalog and toujeo.    Post Discharge Medication Reconciliation: discharge medications reconciled and changed, per note/orders (see AVS).  Plan of care  communicated with patient     Coding guidelines for this visit:  Type of Medical   Decision Making Face-to-Face Visit       within 7 Days of discharge Face-to-Face Visit        within 14 days of discharge   Moderate Complexity 78981 [id number]   High Complexity 88149 62924            Problem list and histories reviewed & adjusted, as indicated.  Additional history: as documented    Patient Active Problem List   Diagnosis     Hypertension goal BP (blood pressure) < 140/90     Recurrent genital HSV (herpes simplex virus) infection     Intertrigo     Ovarian Mass s/p excision in 2008     Sebaceous cyst     Microalbuminuria     CARDIOVASCULAR SCREENING; LDL GOAL LESS THAN 100     Chest pain at rest x1 episode- resolved at rest 3 hours, assoc with food     Obstructive sleep apnea (on cpap)     24 hour contact given to patient      Hydradenitis     CKD (chronic kidney disease) stage 3, GFR 30-59 ml/min     Health Care Home-Not Active     Compulsive skin picking     Need for prophylactic vaccination and inoculation against influenza     High risk OTC medication or supplement use     Rotator cuff tear     Morbid obesity due to excess calories (H)     Chronic bilateral low back pain without sciatica     Primary osteoarthritis of both hips     Schizophrenia, unspecified type (H)     Uncontrolled type 2 diabetes mellitus with stage 3 chronic kidney disease, with long-term current use of insulin (H)     Endometrial cancer determined by uterine biopsy (H)     Past Surgical History   Procedure Laterality Date     Oophorectomy       right     Laparoscopy       for endometriosis     Wrist surgery       right     Cataract       bilateral     Davinci hysterectomy total, bilateral salpingo-oophorectomy, combined N/A 12/29/2016     Procedure: COMBINED DAVINCI HYSTERECTOMY TOTAL, SALPINGO-OOPHORECTOMY;  Surgeon: Franca Rousseau MD;  Location: [facility] OR     Cystoscopy N/A 12/29/2016     Procedure: CYSTOSCOPY;  Surgeon: Prashant Gregory  Franca Castro MD;  Location:  OR       Social History   Substance Use Topics     Smoking status: Former Smoker     Packs/day: 1.00     Years: 0.00     Types: Cigarettes     Quit date: 1/1/1990     Smokeless tobacco: Never Used      Comment: quit 1990     Alcohol use No     Family History   Problem Relation Age of Onset     HEART DISEASE Mother      DIABETES Mother      Hypertension Mother      Psychotic Disorder Mother      schitzophrenia     Hypertension Father      Blood Disease Father      high iron level     HEART DISEASE Maternal Grandmother      CEREBROVASCULAR DISEASE Maternal Grandmother      HEART DISEASE Maternal Grandfather      Breast Cancer Paternal Grandmother      HEART DISEASE Brother      Breast Cancer Sister      Ovarian Cancer Paternal Aunt      Kidney Cancer Daughter      Uterine Cancer Daughter      Uterine Cancer Sister          Current Outpatient Prescriptions   Medication Sig Dispense Refill     valACYclovir (VALTREX) 500 MG tablet TAKE ONE TABLET BY MOUTH ONE TIME DAILY 90 tablet 1     nystatin (MYCOSTATIN) 659282 UNIT/GM POWD Apply topically 2 times daily       insulin glargine U-300 (TOUJEO SOLOSTAR) 300 UNIT/ML injection Inject 96 Units Subcutaneous At Bedtime       diphenhydrAMINE (BENADRYL) 25 MG tablet Take 25 mg by mouth At Bedtime And 25 mg nightly as needed       cholecalciferol 1000 UNITS TABS Take 1 tablet by mouth daily       ziprasidone (GEODON) 80 MG capsule Take 160 mg by mouth every morning And 80 mg daily at 10 AM, and 80 mg nightly as needed       senna-docusate (SENOKOT-S;PERICOLACE) 8.6-50 MG per tablet Take 2 tablets by mouth 2 times daily as needed for constipation       insulin lispro (HUMALOG KWIKPEN) 100 UNIT/ML injection Inject 15 Units Subcutaneous 3 times daily (before meals)       ONE TOUCH VERIO IQ test strip TEST THREE TIMES DAILY OR AS DIRECTED 100 strip 9     valsartan-hydrochlorothiazide (DIOVAN-HCT) 160-25 MG per tablet TAKE 1 TABLET BY MOUTH DAILY 90  tablet 1     B-D U/F 31G X 8 MM insulin pen needle USE FOUR TIMES DAILY AS DIRECTED WITH LANTUS AND PREMEAL INSULIN 200 each 3     blood glucose monitoring (ONE TOUCH DELICA) lancets Use to test blood sugars 3 times daily or as directed. Qty of 1 box= 100 lancets 1 Box 1     order for DME Equipment being ordered: corset to prevent compulsive skin picking of abdomen 1 Device 0     order for DME Equipment being ordered: Lift Chair 1 each 0     pravastatin (PRAVACHOL) 40 MG tablet TAKE ONE TABLET BY MOUTH ONE TIME DAILY 90 tablet 3     aspirin 81 MG tablet Take 1 tablet (81 mg) by mouth daily 100 tablet 3     ORDER FOR DME Equipment being ordered: CPAP machine set at 15 pressure, heated humidifier, supplies: mask and tubing ( supplies) 1 Device 1     ORDER FOR DME Equipment being ordered: CPAP and associated supplies and tubing 1 Device 0     PAROXETINE HCL 40 MG OR TABS Take 80 mg by mouth daily. 80mg=2 tablets.        Allergies   Allergen Reactions     Other [Seasonal Allergies]      Hayfever     BP Readings from Last 3 Encounters:   03/03/17 130/82   02/08/17 132/78   01/31/17 121/64    Wt Readings from Last 3 Encounters:   03/03/17 (!) 326 lb (147.9 kg)   01/31/17 (!) 321 lb 1.6 oz (145.7 kg)   01/14/17 (!) 321 lb 1.6 oz (145.7 kg)                  Labs reviewed in EPIC    Reviewed and updated as needed this visit by clinical staff  Tobacco  Allergies  Meds       Reviewed and updated as needed this visit by Provider         ROS:  Constitutional, HEENT, cardiovascular, pulmonary, gi and gu systems are negative, except as otherwise noted.    OBJECTIVE:                                                    /82  Pulse 126  Temp 99  F (37.2  C) (Oral)  Wt (!) 326 lb (147.9 kg)  LMP 03/24/2010  SpO2 92%  BMI 54.25 kg/m2  Body mass index is 54.25 kg/(m^2).  GENERAL: healthy, alert and no distress  EYES: Eyes grossly normal to inspection, PERRL and conjunctivae and sclerae normal  HENT: ear canals and TM's normal,  nose and mouth without ulcers or lesions  NECK: no adenopathy, no asymmetry, masses, or scars and thyroid normal to palpation  RESP: lungs clear to auscultation - no rales, rhonchi or wheezes  CV: regular rate and rhythm, normal S1 S2, no S3 or S4, no murmur, click or rub, no peripheral edema and peripheral pulses strong  ABDOMEN: soft, nontender, no hepatosplenomegaly, no masses and bowel sounds normal  MS: no gross musculoskeletal defects noted, no edema  Comprehensive back pain exam:  Tenderness of right sciatic notch, Range of motion not limited by pain, Lower extremity strength functional and equal on both sides, Lower extremity reflexes within normal limits bilaterally, Lower extremity sensation normal and equal on both sides and Straight leg raise negative bilaterally    Diagnostic Test Results:  pending     ASSESSMENT/PLAN:                                                      1. RLQ abdominal pain  Possibly radiating from low back.  Will check labs as below.  Will have Care Coordination see if they can assist patient with rides to medical appointments.  - *UA reflex to Microscopic and Culture ([facility] (except [place] and [place])  - Comprehensive metabolic panel  - CBC with platelets differential  - CARE COORDINATION REFERRAL  - Urine Microscopic    2. Low back pain radiating to right leg  Will have  physical therapy start treatment for low back pain.  - CARE COORDINATION REFERRAL    3. Endometrial cancer determined by uterine biopsy (H)  As above.   - CARE COORDINATION REFERRAL    4. Hip pain, left  Improved.      FUTURE APPOINTMENTS:       - Follow-up for annual visit or as needed    DENNY [facility] MIKKI

## 2017-03-03 NOTE — MR AVS SNAPSHOT
After Visit Summary   3/3/2017    Marina Valdez    MRN: [id number]           Patient Information     Date Of Birth          1950        Visit Information        Provider Department      3/3/2017 3:20 PM Carole Decker APRN [facility]        Today's Diagnoses     RLQ abdominal pain    -  1    Low back pain radiating to right leg        Endometrial cancer determined by uterine biopsy (H)        Hip pain, left          Care Instructions    [facility]    If you have any questions regarding to your visit please contact your care team:     Team Pink:   Clinic Hours Telephone Number   Internal Medicine:  Dr. Jeanie Decker, NP       7am-7pm  Monday - Thursday   7am-5pm  Fridays  [phone]  (Appointment scheduling available 24/7)    Questions about your visit?  Team Line  [phone]   Urgent Care - [place] and [place] - 11am-9pm Monday-Friday Saturday-Sunday- 9am-5pm   [place] - 5pm-9pm Monday-Friday Saturday-Sunday- 9am-5pm  [phone] - [place]   [phone] - [place]       What options do I have for visits at the clinic other than the traditional office visit?  To expand how we care for you, many of our providers are utilizing electronic visits (e-visits) and telephone visits, when medically appropriate, for interactions with their patients rather than a visit in the clinic.   We also offer nurse visits for many medical concerns. Just like any other service, we will bill your insurance company for this type of visit based on time spent on the phone with your provider. Not all insurance companies cover these visits. Please check with your medical insurance if this type of visit is covered. You will be responsible for any charges that are not paid by your insurance.      E-visits via [employer]:  generally incur a $35.00 fee.  Telephone visits:  Time spent on the phone: *charged based on time  that is spent on the phone in increments of 10 minutes. Estimated cost:   5-10 mins $30.00   11-20 mins. $59.00   21-30 mins. $85.00   Use [employer] (secure email communication and access to your chart) to send your primary care provider a message or make an appointment. Ask someone on your Team how to sign up for [employer].    For a Price Quote for your services, please call our "[employer]" Line at [phone].    As always, Thank you for trusting us with your health care needs!    Discharged by Suzy POWELL CMA ([facility])          Follow-ups after your visit        Additional Services     CARE COORDINATION REFERRAL       Services are provided by a Care Coordinator for people with complex needs such as: medical, social, or financial troubles.  The Care Coordinator works with the patient and their Primary Care Provider to determine health goals, obtain resources, achieve outcomes, and develop care plans that help coordinate the patient's care.     Reason for Referral: Other: Patient needs assistance with getting rides to medical appointment.    Provide additional details for Care Coordination to best meet the patient's current needs:     Clinical Staff have discussed the Care Coordination Referral with the patient and/or caregiver: yes                  Who to contact     If you have questions or need follow up information about today's clinic visit or your schedule please contact [facility] directly at [phone].  Normal or non-critical lab and imaging results will be communicated to you by [employer], letter or phone within 4 business days after the clinic has received the results. If you do not hear from us within 7 days, please contact the clinic through MyChart or phone. If you have a critical or abnormal lab result, we will notify you by phone as soon as possible.  Submit refill requests through [facility] or call your pharmacy and they will forward the refill request to us. Please allow 3 business days for  "your refill to be completed.          Additional Information About Your Visit        [employer] Information     [employer] lets you send messages to your doctor, view your test results, renew your prescriptions, schedule appointments and more. To sign up, go to [email or web address] . Click on \"Log in\" on the left side of the screen, which will take you to the Welcome page. Then click on \"Sign up Now\" on the right side of the page.     You will be asked to enter the access code listed below, as well as some personal information. Please follow the directions to create your username and password.     Your access code is: [email or web address]  Expires: 2017  3:47 PM     Your access code will  in 90 days. If you need help or a new code, please call your [place] clinic or [phone].        Care EveryWhere ID     This is your [facility] EveryWhere ID. This could be used by other organizations to access your [place] medical records  [id number]        Your Vitals Were     Pulse Temperature Last Period Pulse Oximetry BMI (Body Mass Index)       126 99  F (37.2  C) (Oral) 2010 92% 54.25 kg/m2        Blood Pressure from Last 3 Encounters:   17 130/82   17 132/78   17 121/64    Weight from Last 3 Encounters:   17 (!) 326 lb (147.9 kg)   17 (!) 321 lb 1.6 oz (145.7 kg)   17 (!) 321 lb 1.6 oz (145.7 kg)              We Performed the Following     *UA reflex to Microscopic and Culture ([facility] [place] [facility] (except [place] and [place])     CARE COORDINATION REFERRAL     CBC with platelets differential     Comprehensive metabolic panel        Primary Care Provider Office Phone # Fax #    Mateus Roberson [phone]       [facility] [address] [address]        Thank you!     Thank you for choosing [facility]  for your care. Our goal is always to provide you with excellent care. Hearing back from our patients is " one way we can continue to improve our services. Please take a few minutes to complete the written survey that you may receive in the mail after your visit with us. Thank you!             Your Updated Medication List - Protect others around you: Learn how to safely use, store and throw away your medicines at www.disposemymeds.org.          This list is accurate as of: 3/3/17  3:47 PM.  Always use your most recent med list.                   Brand Name Dispense Instructions for use    aspirin 81 MG tablet     100 tablet    Take 1 tablet (81 mg) by mouth daily       B-D U/F 31G X 8 MM   Generic drug:  insulin pen needle     200 each    USE FOUR TIMES DAILY AS DIRECTED WITH LANTUS AND PREMEAL INSULIN       BENADRYL 25 MG tablet   Generic drug:  diphenhydrAMINE      Take 25 mg by mouth At Bedtime And 25 mg nightly as needed       blood glucose monitoring lancets     1 Box    Use to test blood sugars 3 times daily or as directed. Qty of 1 box= 100 lancets       cholecalciferol 1000 UNITS Tabs      Take 1 tablet by mouth daily       GEODON 80 MG capsule   Generic drug:  ziprasidone      Take 160 mg by mouth every morning And 80 mg daily at 10 AM, and 80 mg nightly as needed       HumaLOG KWIKpen 100 UNIT/ML injection   Generic drug:  insulin lispro      Inject 15 Units Subcutaneous 3 times daily (before meals)       insulin glargine U-300 300 UNIT/ML injection    TOUJEO SOLOSTAR     Inject 96 Units Subcutaneous At Bedtime       nystatin 740655 UNIT/GM Powd    MYCOSTATIN     Apply topically 2 times daily       ONE TOUCH VERIO IQ test strip   Generic drug:  blood glucose monitoring     100 strip    TEST THREE TIMES DAILY OR AS DIRECTED       * order for DME     1 Device    Equipment being ordered: CPAP and associated supplies and tubing       * order for DME     1 Device    Equipment being ordered: CPAP machine set at 15 pressure, heated humidifier, supplies: mask and tubing ( supplies)       * order for DME     1 each     Equipment being ordered: Lift Chair       * order for DME     1 Device    Equipment being ordered: corset to prevent compulsive skin picking of abdomen       PARoxetine 40 MG tablet    PAXIL     Take 80 mg by mouth daily. 80mg=2 tablets.       pravastatin 40 MG tablet    PRAVACHOL    90 tablet    TAKE ONE TABLET BY MOUTH ONE TIME DAILY       senna-docusate 8.6-50 MG per tablet    SENOKOT-S;PERICOLACE     Take 2 tablets by mouth 2 times daily as needed for constipation       valACYclovir 500 MG tablet    VALTREX    90 tablet    TAKE ONE TABLET BY MOUTH ONE TIME DAILY       valsartan-hydrochlorothiazide 160-25 MG per tablet    DIOVAN-HCT    90 tablet    TAKE 1 TABLET BY MOUTH DAILY       * Notice:  This list has 4 medication(s) that are the same as other medications prescribed for you. Read the directions carefully, and ask your doctor or other care provider to review them with you.

## 2017-03-03 NOTE — NURSING NOTE
"Chief Complaint   Patient presents with     Hospital F/U     Fall and post hysterectomy        Initial /82  Pulse 126  Temp 99  F (37.2  C) (Oral)  Wt (!) 326 lb (147.9 kg)  LMP 03/24/2010  SpO2 92%  BMI 54.25 kg/m2 Estimated body mass index is 54.25 kg/(m^2) as calculated from the following:    Height as of 12/29/16: 5' 5\" (1.651 m).    Weight as of this encounter: 326 lb (147.9 kg).  Medication Reconciliation: complete    "

## 2017-03-03 NOTE — Clinical Note
Can we contact  and see if physical therapy can treat patient for low back pain with right sided sciatica?  Thanks, Carole Decker, CNP

## 2017-03-03 NOTE — PATIENT INSTRUCTIONS
[facility]    If you have any questions regarding to your visit please contact your care team:     Team Pink:   Clinic Hours Telephone Number   Internal Medicine:  Dr. Jeanei Decker NP       7am-7pm  Monday - Thursday   7am-5pm  Fridays  [phone]  (Appointment scheduling available 24/7)    Questions about your visit?  Team Line  [phone]   Urgent [facility] - [place] [facility] - 11am-9pm Monday-Friday Saturday-Sunday- 9am-5pm   [place] - 5pm-9pm Monday-Friday Saturday-Sunday- 9am-5pm  [phone] - [place]   [phone] - [place]       What options do I have for visits at the clinic other than the traditional office visit?  To expand how we care for you, many of our providers are utilizing electronic visits (e-visits) and telephone visits, when medically appropriate, for interactions with their patients rather than a visit in the clinic.   We also offer nurse visits for many medical concerns. Just like any other service, we will bill your insurance company for this type of visit based on time spent on the phone with your provider. Not all insurance companies cover these visits. Please check with your medical insurance if this type of visit is covered. You will be responsible for any charges that are not paid by your insurance.      E-visits via "[employer]:  generally incur a $35.00 fee.  Telephone visits:  Time spent on the phone: *charged based on time that is spent on the phone in increments of 10 minutes. Estimated cost:   5-10 mins $30.00   11-20 mins. $59.00   21-30 mins. $85.00   Use [employer] (secure email communication and access to your chart) to send your primary care provider a message or make an appointment. Ask someone on your Team how to sign up for "[employer].    For a Price Quote for your services, please call our Consumer Price Line at [phone].    As always, Thank you for trusting us with your health care  needs!    Discharged by Suzy POWELL CMA ([facility])

## 2017-03-06 ENCOUNTER — CARE COORDINATION (OUTPATIENT)
Dept: CARE COORDINATION | Facility: CLINIC | Age: 67
End: 2017-03-06

## 2017-03-06 ENCOUNTER — TELEPHONE (OUTPATIENT)
Dept: FAMILY MEDICINE | Facility: CLINIC | Age: 67
End: 2017-03-06

## 2017-03-06 LAB
ALBUMIN SERPL-MCNC: 3.1 G/DL (ref 3.4–5)
ALP SERPL-CCNC: 201 U/L (ref 40–150)
ALT SERPL W P-5'-P-CCNC: 45 U/L (ref 0–50)
ANION GAP SERPL CALCULATED.3IONS-SCNC: 11 MMOL/L (ref 3–14)
AST SERPL W P-5'-P-CCNC: 50 U/L (ref 0–45)
BILIRUB SERPL-MCNC: 0.4 MG/DL (ref 0.2–1.3)
BUN SERPL-MCNC: 18 MG/DL (ref 7–30)
CALCIUM SERPL-MCNC: 9.5 MG/DL (ref 8.5–10.1)
CHLORIDE SERPL-SCNC: 104 MMOL/L (ref 94–109)
CO2 SERPL-SCNC: 26 MMOL/L (ref 20–32)
CREAT SERPL-MCNC: 1.26 MG/DL (ref 0.52–1.04)
GFR SERPL CREATININE-BSD FRML MDRD: 43 ML/MIN/1.7M2
GLUCOSE SERPL-MCNC: 165 MG/DL (ref 70–99)
POTASSIUM SERPL-SCNC: 3.5 MMOL/L (ref 3.4–5.3)
PROT SERPL-MCNC: 7.2 G/DL (ref 6.8–8.8)
SODIUM SERPL-SCNC: 141 MMOL/L (ref 133–144)

## 2017-03-06 NOTE — PROGRESS NOTES
Clinic Care Coordination Contact  Social Work Outreach    Data: SW initial call to pt    Intervention: Sw contacted pt regarding referral on transportation issues. Pt reported they needed assistance with getting to an appointment in [place]. Pt has been able to schedule and attend primary appointments without assistance.Pt [place] currently covered through [place] and [place].    SW contacted [employer] to verify pt eligibility for transportation services.SW educated pt on contacting [employer] with appointment information and using the service for future appointments. Pt accepted continued CC outreach going forward.    Assessment:Pt needs transportation for medical appointments when scheduled.    Plan: YADIRA will contact pt in 2 weeks to assess if further support is needed. YADIRA will mail letter to pt with resources and CC contact info.    Win Nicole, [facility]  Care Coordination  [place]  [phone]  [email or web address]

## 2017-03-06 NOTE — LETTER
My Access Plan    Presenting Problem Signs and Symptoms Treatment Plan    Questions or concerns during clinic hours    I will call the clinic directly:                     [facility]    [address]               [place] [address]                  [phone]       Questions or concerns outside clinic hours    I will call the 24 hour nurse line at [phone] or [phone]    Patient needs to schedule an appointment    I will call the 24 hour scheduling team at [phone] or clinic directly at [phone]    Same day treatment     I will call the clinic first, nurse line if after hours, urgent care and express care if needed   Clinic Care Coordinators (RN/Social Work):            [facility]      Susanne Smith RN BSN N  [phone]    LYNN Bowers W [phone]   Crisis Services: Behavioral or Mental Health    [facility] (Behavioral Health Providers) [phone]    [facility] [phone]    [facility] [phone]    [facility]is [facility] (24/7): [phone]       Emergency treatment--Immediately    CAll 931

## 2017-03-06 NOTE — LETTER
[facility]  [address] [place] [address]  [phone]    March 6, 2017      Marina Valdez  [address]    [place] [address]        Dear Marina,    I am a SW Care Coordinator, working with the care team at your clinic including your primary care provider,Mateus Roberson.  It was nice to talk with you on 03/06/17. Hope you are feeling better. I would like to introduce you to [facility] Care Coordination Program. The Care Coordinator is a nurse or  who understands the health care system. The goal of Care Coordination is to help you manage your health and improve access to the [facility] system in the most efficient manner.      The registered nurse (RN) assists you in meeting your health care goals by providing education, coordinating services, and strengthening the communication among your providers. The  (SW) is available to assist in financial, behavioral, psychosocial, and chemical dependency and counseling/psychiatric resources.     Please feel free to contact me at [phone]. I look forward to your call and partnering with you to achieve your optimal state of wellness.  We at [facility] are focused on providing you with the highest-quality healthcare experience possible and that all starts with you.       Sincerely,         GAVIOTA Bowers  Care Coordination  [facility] [place]  [phone]  [email or web address]    MNET --[phone]

## 2017-03-06 NOTE — PROGRESS NOTES
Please call patient-    Her labs look okay aside from an elevation in alk phosphate and a mild elevation in one her liver enzymes.  I would like to repeat a hepatic panel again in 1 week to see if this remains elevated or if it has returned to normal.    Thanks,  Carole Decker, CNP

## 2017-03-07 NOTE — TELEPHONE ENCOUNTER
Call to pt to help coordinate appt for follow up with Dr Cerda post surgery.  Pt states she saw PCP and is awaiting forms from  to set up ride.  Pt is aware Dr Cerda will be out of clinic for one month but pt may see Zunilda Pickett NP who is in clinic on Thursday.  Pt aware and states she will call back to schedule.    Usha Noble RN, BSN

## 2017-03-07 NOTE — TELEPHONE ENCOUNTER
Message  Received: Today       Carole Decker APRN CNP  P Fz Rn Triage Pool                   Can we contact  and see if physical therapy can treat patient for low back pain with right sided sciatica?     Thanks,   Carole Decker CNP       Called [facility]'s main number as noted in scanned documents in chart.  Not sure who patient's main CM is.  Was transferred to Arline, one of their clinic managers.    Left message on Arline's  with verbal orders for HC PT to eval and treat low back pain with right sided sciatica  RN hotline number given  Harshal Hayward RN

## 2017-03-10 DIAGNOSIS — B37.2 CANDIDAL INTERTRIGO: Primary | ICD-10-CM

## 2017-03-10 NOTE — TELEPHONE ENCOUNTER
nystatin (MYCOSTATIN) [id number] UNIT/GM POWD      Last Written Prescription Date: unknown  Last Fill Quantity: unknown,  # refills: unknown   Last Office Visit with [facility], P or [facility] prescribing provider: 3/6/17

## 2017-03-14 RX ORDER — NYSTATIN 100000 [USP'U]/G
POWDER TOPICAL
Qty: 15 G | Refills: 0 | Status: SHIPPED | OUTPATIENT
Start: 2017-03-14 | End: 2017-04-19

## 2017-03-14 NOTE — TELEPHONE ENCOUNTER
Routing refill request to provider for review/approval because:  Drug not active on patient's medication list.    Marion COVINGTON, RN, BSN

## 2017-03-15 DIAGNOSIS — R74.8 ELEVATED LIVER ENZYMES: ICD-10-CM

## 2017-03-15 DIAGNOSIS — R74.8 ELEVATED ALKALINE PHOSPHATASE LEVEL: ICD-10-CM

## 2017-03-15 LAB
ALBUMIN SERPL-MCNC: 3 G/DL (ref 3.4–5)
ALP SERPL-CCNC: 160 U/L (ref 40–150)
ALT SERPL W P-5'-P-CCNC: 27 U/L (ref 0–50)
AST SERPL W P-5'-P-CCNC: 31 U/L (ref 0–45)
BILIRUB DIRECT SERPL-MCNC: 0.2 MG/DL (ref 0–0.2)
BILIRUB SERPL-MCNC: 0.5 MG/DL (ref 0.2–1.3)
PROT SERPL-MCNC: 6.9 G/DL (ref 6.8–8.8)

## 2017-03-15 PROCEDURE — 36415 COLL VENOUS BLD VENIPUNCTURE: CPT | Performed by: NURSE PRACTITIONER

## 2017-03-15 PROCEDURE — 80076 HEPATIC FUNCTION PANEL: CPT | Performed by: NURSE PRACTITIONER

## 2017-03-15 NOTE — LETTER
[facility]  [address] [place] [address]    March 16, 2017    Marina Valdez  [address]    [place] [address]          Dear Marina,  Your liver function test has improved.   Enclosed is a copy of your results.     Results for orders placed or performed in visit on 03/15/17   Hepatic panel (Albumin, ALT, AST, Bili, Alk Phos, TP)   Result Value Ref Range    Bilirubin Direct 0.2 0.0 - 0.2 mg/dL    Bilirubin Total 0.5 0.2 - 1.3 mg/dL    Albumin 3.0 (L) 3.4 - 5.0 g/dL    Protein Total 6.9 6.8 - 8.8 g/dL    Alkaline Phosphatase 160 (H) 40 - 150 U/L    ALT 27 0 - 50 U/L    AST 31 0 - 45 U/L       If you have any questions or concerns, please call myself or my nurse at [phone].      Sincerely,        Carole Decker, CNP /pb

## 2017-03-16 NOTE — PROGRESS NOTES
Dear Marina,    Your recent test results are attached.      Your liver function test has improved.    If you have any questions please feel free to contact [phone] or myself via [employer].    Sincerely,  Carole Decker, CNP

## 2017-03-27 ENCOUNTER — TELEPHONE (OUTPATIENT)
Dept: ONCOLOGY | Facility: CLINIC | Age: 67
End: 2017-03-27

## 2017-03-27 NOTE — TELEPHONE ENCOUNTER
Marina called stating that VERONICA Watson was going to help her find a PCP and if she could please call her back.      Therese Zacarias  CMA

## 2017-03-28 NOTE — TELEPHONE ENCOUNTER
Return call to pt and left message to call back.  Informed pt on VM that pt informed writer at last call that she was going to schedule with referring gynecologist Dr Watson in [place]. Will also have Zunilda Pickett NP dictate letter when in clinic on Thursday for referring doctor on recommendations for surveillance follow up as pt is having difficulty getting post op and follow up appts to [place]. Informed pt to call back to writer and update us if plans have changed.    Usha Noble, RN, BSN

## 2017-04-06 ENCOUNTER — TELEPHONE (OUTPATIENT)
Dept: NURSING | Facility: CLINIC | Age: 67
End: 2017-04-06

## 2017-04-06 ENCOUNTER — TELEPHONE (OUTPATIENT)
Dept: ONCOLOGY | Facility: CLINIC | Age: 67
End: 2017-04-06

## 2017-04-06 DIAGNOSIS — L01.00 IMPETIGO: Primary | ICD-10-CM

## 2017-04-06 RX ORDER — CEPHALEXIN 500 MG/1
500 CAPSULE ORAL 4 TIMES DAILY
Qty: 28 CAPSULE | Refills: 0 | Status: SHIPPED | OUTPATIENT
Start: 2017-04-06 | End: 2017-04-18

## 2017-04-06 NOTE — TELEPHONE ENCOUNTER
insulin glargine U-300 (TOUJEO SOLOSTAR) 300 UNIT/ML injection       Last Written Prescription Date: 2/1/17  Last Fill Quantity: 1, # refills: 0  Last Office Visit with G, P or [facility] prescribing provider:  3/3/17   Next 5 appointments (look out 90 days)     Apr 10, 2017  2:00 PM CDT   [facility]    [address] [address] [facility] [address] [phone]                   BP Readings from Last 3 Encounters:   03/03/17 130/82   02/08/17 132/78   01/31/17 121/64     Lab Results   Component Value Date    MICROL 8 06/21/2016     Lab Results   Component Value Date    UMALCR 29.37 06/21/2016     Creatinine   Date Value Ref Range Status   03/03/2017 1.26 (H) 0.52 - 1.04 mg/dL Final   ]  GFR Estimate   Date Value Ref Range Status   03/03/2017 43 (L) >60 mL/min/1.7m2 Final     Comment:     Non  GFR Calc   11/11/2016 41 (L) >60 mL/min/1.7m2 Final     Comment:     Non  GFR Calc   03/25/2016 50 (L) >60 mL/min/1.7m2 Final     Comment:     Non  GFR Calc     GFR Estimate If Black   Date Value Ref Range Status   03/03/2017 52 (L) >60 mL/min/1.7m2 Final     Comment:      GFR Calc   11/11/2016 49 (L) >60 mL/min/1.7m2 Final     Comment:      GFR Calc   03/25/2016 61 >60 mL/min/1.7m2 Final     Comment:      GFR Calc     Lab Results   Component Value Date    CHOL 129 06/14/2016     Lab Results   Component Value Date    HDL 69 06/14/2016     Lab Results   Component Value Date    LDL 42 06/14/2016     Lab Results   Component Value Date    TRIG 90 06/14/2016     Lab Results   Component Value Date    CHOLHDLRATIO 2.2 07/09/2015     Lab Results   Component Value Date    AST 31 03/15/2017     Lab Results   Component Value Date    ALT 27 03/15/2017     Lab Results   Component Value Date    A1C 9.4 11/11/2016    A1C 10.2 06/14/2016    A1C 7.9 02/11/2016    A1C  7.7 11/10/2015    A1C 8.1 07/09/2015     Potassium   Date Value Ref Range Status   03/03/2017 3.5 3.4 - 5.3 mmol/L Final

## 2017-04-06 NOTE — TELEPHONE ENCOUNTER
"Patient calling \"I think I have impetigo.\"  Patient calling requesting a Rx for \"impetigo.\" Reporting symptoms  starting 2 days ago on right ear near earlobe. Reporting sore is  \"less then pea size.\" Mcdonald scabbing that oozes at night with  yellow discharge. Afebrile. Patient stating she is very familar with  impetigo and would like a prescription.     Please advise.  Marlin Schreiber RN    "

## 2017-04-06 NOTE — TELEPHONE ENCOUNTER
"Call Type: Triage Call    Presenting Problem: Patient calling \"I think I have impetigo.\"  Patient calling requesting a Rx for \"impetigo.\" Reporting symptoms  starting 2 days ago on right ear near earlobe. Reporting sore is  \"less then pea size.\" Mcdonald scabbing that oozes at night with  yellow discharge. Afebrile. Patient stating she is very familar with  impetigo and would like a prescription. Please call patient at  Phone  , [facility],  [place]  [employer].  Triage Note:  Guideline Title: Impetigo (Infected Sore) (Pediatric)  Recommended Disposition: Provide Home/Self Care  Original Inclination: Did not know what to do  Override Disposition:  Intended Action: Follow advice given  Physician Contacted: No  1 or 2 impetigo sores ?  YES  Sore throat ? NO  Child sounds very sick or weak to the triager ? NO  Fever ? NO  [1] Red streak runs from impetigo AND [2] fever ? NO  Pink or tea-colored urine ? NO  [1] Red streak or bright red area around 1 sore AND [2] no fever ? NO  Several sores (3 or more) ? NO  Sores and crusts are also inside the nose ? NO  Impetigo in 2 or more children (e.g. sibs,  groups) ? NO  [1] Using antibiotic ointment > 48 hours AND [2] impetigo sore increases in size or  new one appears ? NO  Sounds like a life-threatening emergency to the triager ? NO  Doesn't match the SYMPTOMS of impetigo ? NO  [1] Red spreading area around 1 sore AND [2] fever ? NO  Large sore (more than 1 inch or 2.5 cm across) ? NO  [1] Using antibiotic ointment > 7 days AND [2] impetigo not completely healed ? NO  Child plays contact sports ? NO  [1] Impetigo progressed to cellulitis and [2] taking an antibiotic ? NO  Physician Instructions:  Care Advice: ANTIBIOTIC OINTMENT: * Apply an antibiotic ointment 3 times per  day. * Use Bacitracin ointment (OTC) or one the family already has. * Cover  it with a Band-Aid to prevent scratching and spread. * Repeat the washing,  ointment and " Band-Aid 3 times per day.  CALL BACK IF: * Impetigo increases in size after 48 hours on antibiotic  ointment * New impetigo sore occurs on antibiotic ointment * Not completely  healed in 1 week * Your child becomes worse  CARE ADVICE given per Impetigo (Infected Sore) Pediatric guideline.  REASSURANCE AND EDUCATION: * Impetigo is a superficial skin infection that  usually starts in a scratch or insect bite. It usually responds to  treatment with an antibiotic ointment.  REMOVE SCABS: * Soak off the scab using an antibacterial soap and warm  water. * The bacteria live underneath the scab.

## 2017-04-06 NOTE — TELEPHONE ENCOUNTER
Left message for patient to return call to the RN hotline number at [phone].  Marlin Schreiber RN

## 2017-04-06 NOTE — TELEPHONE ENCOUNTER
Prescription sent. Specifics of impetigo care to be discussed with patient     Follow up appointment if not better within one week    Mateus Roberson MD

## 2017-04-07 RX ORDER — INSULIN GLARGINE 300 U/ML
INJECTION, SOLUTION SUBCUTANEOUS
Qty: 4.5 ML | Refills: 0 | Status: SHIPPED | OUTPATIENT
Start: 2017-04-07 | End: 2017-04-17

## 2017-04-07 NOTE — TELEPHONE ENCOUNTER
Patient updated on Cephalexin prescription and home care below  Patient verbalized understanding.      If you have impetigo  Impetigo is easily spread from person to person. A person can get sores 1 to 3 days after being exposed to another person s sores. Make sure to protect those around you. To prevent the bacteria from spreading to others:    Cover your sores with a bandage. Wash your hands often. This will also help you avoid spreading the infection to other parts of your body.    Don t share washcloths, towels, pillows, sheets, or clothes with others. Wash these items in hot water before using them again.    Don t scratch or pick at your sores.    Wash your sores with soap and water. Apply an antibacterial cream as advised.      When to call the health care provider  Make sure to contact your health care provider if you have:    Sores that spread or have more pus after 3 days of treatment    Pain or swelling that s new or worse    Fever of 100.4 F (38 C) or higher    Loss of appetite or vomiting     Harshal Hayward RN

## 2017-04-07 NOTE — TELEPHONE ENCOUNTER
Left message for patient to call back in regards to approved medication and needing an appointment.      Emely Bernal, CMA

## 2017-04-07 NOTE — TELEPHONE ENCOUNTER
Routing refill request to provider for review/approval because:  Medication is reported/historical        Nadia Calderon RN - BC

## 2017-04-07 NOTE — TELEPHONE ENCOUNTER
Patient is due for diabetes mellitus follow up. The appointment needs to be by next month / within 30 days     Refills provided good for one month with Toujeo (insulin glargine U300)     Mateus Roberson MD

## 2017-04-10 ENCOUNTER — OFFICE VISIT (OUTPATIENT)
Dept: OBGYN | Facility: CLINIC | Age: 67
End: 2017-04-10
Payer: MEDICARE

## 2017-04-10 VITALS
HEART RATE: 117 BPM | DIASTOLIC BLOOD PRESSURE: 74 MMHG | SYSTOLIC BLOOD PRESSURE: 127 MMHG | WEIGHT: 293 LBS | TEMPERATURE: 97.4 F | OXYGEN SATURATION: 95 % | BODY MASS INDEX: 53.32 KG/M2

## 2017-04-10 DIAGNOSIS — C54.1 ENDOMETRIAL CANCER DETERMINED BY UTERINE BIOPSY (H): Primary | ICD-10-CM

## 2017-04-10 PROCEDURE — 99213 OFFICE O/P EST LOW 20 MIN: CPT | Performed by: OBSTETRICS & GYNECOLOGY

## 2017-04-10 RX ORDER — LORAZEPAM 0.5 MG/1
0.5 TABLET ORAL
Refills: 0 | COMMUNITY
Start: 2017-01-09 | End: 2017-11-02

## 2017-04-10 RX ORDER — ACETAMINOPHEN 500 MG
500 TABLET ORAL
COMMUNITY
Start: 2017-01-13 | End: 2019-07-12

## 2017-04-10 RX ORDER — TRAMADOL HYDROCHLORIDE 50 MG/1
50 TABLET ORAL
Refills: 1 | COMMUNITY
Start: 2016-11-13 | End: 2017-07-08

## 2017-04-10 NOTE — MR AVS SNAPSHOT
After Visit Summary   4/10/2017    Marina Valdez    MRN: [id number]           Patient Information     Date Of Birth          1950        Visit Information        Provider Department      4/10/2017 2:00 PM Rodger Watson MD [facility]        Today's Diagnoses     Endometrial cancer determined by uterine biopsy (H)    -  1      Care Instructions                                                         If you have any questions regarding your visit, Please contact your care team.    Women s Health CLINIC HOURS TELEPHONE NUMBER   MD Katherine Hart CMA Lisa -    VERONICA Almonte RN       Monday:       7:30-4:30 [place]  Wednesday:       7:30-4:30 [place]  Thursday:       7:30-1:30 [place]  Friday:       7:30-11:30 [facility]  [address] [place], [place]  [id number] [phone] ask for [facility]  [address]  [place], [place] [id number] [phone] ask for [facility]    Imaging Scheduling for [place]:  [phone]    Imaging Scheduling for [place]: [phone]       Urgent Care locations:    [facility] Saturday and Sunday   9 am - 5 pm    Monday-Friday   12 pm - 8 pm  Saturday and Sunday   9 am - 5 pm   [phone]     [facility] Labor and Delivery:  [phone]    If you need a medication refill, please contact your pharmacy. Please allow 3 business days for your refill to be completed.  As always, Thank you for trusting us with your healthcare needs!            Follow-ups after your visit        Follow-up notes from your care team     Return in about 6 months (around 10/10/2017) for Follow up Visit.      Your next 10 appointments already scheduled     Apr 18, 2017 11:10 AM CDT   Office Visit with MD Bart [place] ([facility] [place])    [address] [place] [address]  [place] [address]  "  [phone]           Bring a current list of meds and any records pertaining to this visit.  For Physicals, please bring immunization records and any forms needing to be filled out.  Please arrive 10 minutes early to complete paperwork.              Who to contact     If you have questions or need follow up information about today's clinic visit or your schedule please contact [facility] directly at [phone].  Normal or non-critical lab and imaging results will be communicated to you by MyChart, letter or phone within 4 business days after the clinic has received the results. If you do not hear from us within 7 days, please contact the clinic through [employer] or phone. If you have a critical or abnormal lab result, we will notify you by phone as soon as possible.  Submit refill requests through [employer] or call your pharmacy and they will forward the refill request to us. Please allow 3 business days for your refill to be completed.          Additional Information About Your Visit        [employer] Information     [employer] lets you send messages to your doctor, view your test results, renew your prescriptions, schedule appointments and more. To sign up, go to [email or web address] . Click on \"Log in\" on the left side of the screen, which will take you to the Welcome page. Then click on \"Sign up Now\" on the right side of the page.     You will be asked to enter the access code listed below, as well as some personal information. Please follow the directions to create your username and password.     Your access code is: [email or web address]  Expires: 2017  4:47 PM     Your access code will  in 90 days. If you need help or a new code, please call your [facility] or [phone].        Care EveryWhere ID     This is your Care EveryWhere ID. This could be used by other organizations to access your [facility] medical records  [id number]        Your Vitals Were     Pulse Temperature Last Period " Pulse Oximetry BMI (Body Mass Index)       117 97.4  F (36.3  C) (Oral) 03/24/2010 95% 53.32 kg/m2        Blood Pressure from Last 3 Encounters:   04/10/17 127/74   03/03/17 130/82   02/08/17 132/78    Weight from Last 3 Encounters:   04/10/17 (!) 145.3 kg (320 lb 6.4 oz)   03/03/17 (!) 147.9 kg (326 lb)   01/31/17 (!) 145.7 kg (321 lb 1.6 oz)              Today, you had the following     No orders found for display       Primary Care Provider Office Phone # Fax #    Mateus Roberson [phone]       [facility] [address] [address]        Thank you!     Thank you for choosing [facility]  for your care. Our goal is always to provide you with excellent care. Hearing back from our patients is one way we can continue to improve our services. Please take a few minutes to complete the written survey that you may receive in the mail after your visit with us. Thank you!             Your Updated Medication List - Protect others around you: Learn how to safely use, store and throw away your medicines at www.disposemymeds.org.          This list is accurate as of: 4/10/17 11:59 PM.  Always use your most recent med list.                   Brand Name Dispense Instructions for use    acetaminophen 500 MG tablet    TYLENOL     Take 500 mg by mouth       aspirin 81 MG tablet     100 tablet    Take 1 tablet (81 mg) by mouth daily       B-D U/F 31G X 8 MM   Generic drug:  insulin pen needle     200 each    USE FOUR TIMES DAILY AS DIRECTED WITH LANTUS AND PREMEAL INSULIN       BENADRYL 25 MG tablet   Generic drug:  diphenhydrAMINE      Take 25 mg by mouth At Bedtime And 25 mg nightly as needed       blood glucose monitoring lancets     1 Box    Use to test blood sugars 3 times daily or as directed. Qty of 1 box= 100 lancets       cephALEXin 500 MG capsule    KEFLEX    28 capsule    Take 1 capsule (500 mg) by mouth 4 times daily       cholecalciferol 1000 UNITS Tabs       Take 1 tablet by mouth daily       GEODON 80 MG capsule   Generic drug:  ziprasidone      Take 160 mg by mouth every morning And 80 mg daily at 10 AM, and 80 mg nightly as needed       HumaLOG KWIKpen 100 UNIT/ML injection   Generic drug:  insulin lispro      Inject 15 Units Subcutaneous 3 times daily (before meals)       LORazepam 0.5 MG tablet    ATIVAN     0.5 mg Reported on 4/10/2017       NYSTOP [id number] UNIT/GM Powd   Generic drug:  nystatin     15 g    APPLY TO GROIN AND UNDER BREAST TWICE DAILY UNTIL REDNESS HAS RESOLVED.       ONE TOUCH VERIO IQ test strip   Generic drug:  blood glucose monitoring     100 strip    TEST THREE TIMES DAILY OR AS DIRECTED       * order for DME     1 Device    Equipment being ordered: CPAP and associated supplies and tubing       * order for DME     1 Device    Equipment being ordered: CPAP machine set at 15 pressure, heated humidifier, supplies: mask and tubing ( supplies)       * order for DME     1 each    Equipment being ordered: Lift Chair       * order for DME     1 Device    Equipment being ordered: corset to prevent compulsive skin picking of abdomen       PARoxetine 40 MG tablet    PAXIL     Take 80 mg by mouth daily. 80mg=2 tablets.       pravastatin 40 MG tablet    PRAVACHOL    90 tablet    TAKE ONE TABLET BY MOUTH ONE TIME DAILY       senna-docusate 8.6-50 MG per tablet    SENOKOT-S;PERICOLACE     Take 2 tablets by mouth 2 times daily as needed for constipation       TOUJEO SOLOSTAR 300 UNIT/ML injection   Generic drug:  insulin glargine U-300     4.5 mL    INJECT 88 UNITS UNDER THE SKIN AT BEDTIME.       traMADol 50 MG tablet    ULTRAM     50 mg       valACYclovir 500 MG tablet    VALTREX    90 tablet    TAKE ONE TABLET BY MOUTH ONE TIME DAILY       valsartan-hydrochlorothiazide 160-25 MG per tablet    DIOVAN-HCT    90 tablet    TAKE 1 TABLET BY MOUTH DAILY       * Notice:  This list has 4 medication(s) that are the same as other medications prescribed for you. Read  the directions carefully, and ask your doctor or other care provider to review them with you.

## 2017-04-10 NOTE — TELEPHONE ENCOUNTER
Message left for patient to return call to make an appointment for a Diabetic Check with Dr. Da Padilla MA

## 2017-04-10 NOTE — NURSING NOTE
"Chief Complaint   Patient presents with     Consult     6 month follow up       Initial /74  Pulse 117  Temp 97.4  F (36.3  C) (Oral)  Wt (!) 320 lb 6.4 oz (145.3 kg)  LMP 03/24/2010  SpO2 95%  BMI 53.32 kg/m2 Estimated body mass index is 53.32 kg/(m^2) as calculated from the following:    Height as of 12/29/16: 5' 5\" (1.651 m).    Weight as of this encounter: 320 lb 6.4 oz (145.3 kg).  Medication Reconciliation: complete   Katherine Cotton CMA      "

## 2017-04-10 NOTE — PROGRESS NOTES
Marina is a 66 year old   is here today complaining of .  She is status post hysterectomy for endometrial carcinoma 2016.     No urinary frequency or dysuria, bladder or kidney problems, denies any bleeding    ROS: Ten point review of systems was reviewed and negative except the above.    Gyn Hx:      Past Medical History:   Diagnosis Date     Bipolar affect, depressed (H)      Diabetes      Endometrial cancer (H)      HTN (hypertension)      Morbid obesity due to excess calories (H)      CORDELL (obstructive sleep apnea)     uses CPAP     Renal disease      Schizophrenia (H)      Past Surgical History:   Procedure Laterality Date     cataract      bilateral     CYSTOSCOPY N/A 2016    Procedure: CYSTOSCOPY;  Surgeon: Franca Rousseau MD;  Location: [facility]     DAVINCI HYSTERECTOMY TOTAL, BILATERAL SALPINGO-OOPHORECTOMY, COMBINED N/A 2016    Procedure: COMBINED DAVINCI HYSTERECTOMY TOTAL, SALPINGO-OOPHORECTOMY;  Surgeon: Franca Rousseau MD;  Location: [facility]     LAPAROSCOPY      for endometriosis     OOPHORECTOMY      right     WRIST SURGERY      right     Patient Active Problem List   Diagnosis     Hypertension goal BP (blood pressure) < 140/90     Recurrent genital HSV (herpes simplex virus) infection     Intertrigo     Ovarian Mass s/p excision in      Sebaceous cyst     Microalbuminuria     CARDIOVASCULAR SCREENING; LDL GOAL LESS THAN 100     Chest pain at rest x1 episode- resolved at rest 3 hours, assoc with food     Obstructive sleep apnea (on cpap)     24 hour contact given to patient      Hydradenitis     CKD (chronic kidney disease) stage 3, GFR 30-59 ml/min     Health Care Home-Not Active     Compulsive skin picking     Need for prophylactic vaccination and inoculation against influenza     High risk OTC medication or supplement use     Rotator cuff tear     Morbid obesity due to excess calories (H)     Chronic bilateral low back pain without sciatica     Primary  osteoarthritis of both hips     Schizophrenia, unspecified type (H)     Uncontrolled type 2 diabetes mellitus with stage 3 chronic kidney disease, with long-term current use of insulin (H)     Endometrial cancer determined by uterine biopsy (H)     Impetigo       ALL/Meds: Her medication and allergy histories were reviewed and are documented in their appropriate chart areas.    SH: Reviewed and documented in the appropriate area of the chart.  FH:  Her family history is reviewed and updated in the chart, today.  PMH: Her past medical, surgical, and obstetric histories were reviewed and updated today in the appropriate chart areas.    PE: LMP 03/24/2010  There is no height or weight on file to calculate BMI.    General Appearance:  healthy, alert, active, no distress  Cardiovascular:  Regular rate and Rhythm  Neck: Supple, no adenopathy and thyroid normal  Lungs:  Clear, without wheeze, rale or rhonchi  Breast: deferred  Abdomen: Benign, Soft, flat, non-tender, No masses, organomegaly, No inguinal nodes and Bowel sounds normoactive.   Pelvic:       - Ext: Vulva and perineum are normal without lesion, mass or discharge        - Bladder: no tenderness, no masses       - Vagina:  atrophic and without discharge       - Rectal: deferred        A/P:3 months post hysterectomy for Endometrial carcinoma     -  No sign of recurrence.  Follow up in 3 months   - No orders of the defined types were placed in this encounter.

## 2017-04-10 NOTE — PATIENT INSTRUCTIONS
If you have any questions regarding your visit, Please contact your care team.    [facility] CLINIC HOURS TELEPHONE NUMBER   MD Katherine Hart CMA Lisa -    VERONICA Almonte RN       Monday:       7:30-4:30 [place]  Wednesday:       7:30-4:30 [place]  Thursday:       7:30-1:30 [place]  Friday:       7:30-11:30 [facility]  [address] [place]  [address]  [phone] ask for [facility]  [address] [place] [address]  [phone] ask for [facility]    Imaging Scheduling for [place]:  [phone]    Imaging Scheduling for [place]: [phone]       Urgent Care locations:    [facility] Saturday and Sunday   9 am - 5 pm    Monday-Friday   12 pm - 8 pm  Saturday and Sunday   9 am - 5 pm   [phone]     [facility] Labor and Delivery:  [phone]    If you need a medication refill, please contact your pharmacy. Please allow 3 business days for your refill to be completed.  As always, Thank you for trusting us with your healthcare needs!

## 2017-04-13 NOTE — TELEPHONE ENCOUNTER
Patient has appointment scheduled to see Dr. Roberson on Tuesday, April 18th at 11:10 a.m.Camila Rudolph,

## 2017-04-17 NOTE — PROGRESS NOTES
"  SUBJECTIVE:                                                    Marina Valdez is a 66 year old female who presents to clinic today for the following health issues:    Right Buttock Pain -- Patient states she has pain when she moves on her right buttock. It had an onset of a few months ago. She describes the pain as a 5/10 and has been constant. She reports it worsens when moving.    Diabetes Follow-up    Patient is checking blood sugars: once daily.  Results are as follows:         am - \" good\"    Diabetic concerns: None     Symptoms of hypoglycemia (low blood sugar): none     Paresthesias (numbness or burning in feet) or sores: No     Date of last diabetic eye exam: 4/2016    Lab Results   Component Value Date    A1C 7.1 04/18/2017    A1C 9.4 11/11/2016    A1C 10.2 06/14/2016    A1C 7.9 02/11/2016    A1C 7.7 11/10/2015        Hyperlipidemia Follow-Up      Rate your low fat/cholesterol diet?: fair    Taking statin?  Yes, no muscle aches from statin    Other lipid medications/supplements?:  None    Recent Labs   Lab Test  06/14/16   1338  07/09/15   1228   10/15/13   1022   CHOL  129  139   --   148   HDL  69  63   --   55   LDL  42  55   < >  68   TRIG  90  103   --   125   CHOLHDLRATIO   --   2.2   --   2.7    < > = values in this interval not displayed.     --     Hypertension Follow-up      Outpatient blood pressures are being checked at home.  Results are self.    Low Salt Diet: low salt    BP Readings from Last 3 Encounters:   04/18/17 124/60   04/10/17 127/74   03/03/17 130/82            Amount of exercise or physical activity: 6-7 days/week for an average of less than 15 minutes    Problems taking medications regularly: No    Medication side effects: none    Diet: diabetic    REGGIE/MA      Problem list and histories reviewed & adjusted, as indicated.  Additional history: as documented    Patient Active Problem List   Diagnosis     Hypertension goal BP (blood pressure) < 140/90     Recurrent " genital HSV (herpes simplex virus) infection     Intertrigo     Ovarian Mass s/p excision in 2008     Sebaceous cyst     Microalbuminuria     CARDIOVASCULAR SCREENING; LDL GOAL LESS THAN 100     Chest pain at rest x1 episode- resolved at rest 3 hours, assoc with food     Obstructive sleep apnea (on cpap)     24 hour contact given to patient      Hydradenitis     CKD (chronic kidney disease) stage 3, GFR 30-59 ml/min     Health Care Home-Not Active     Compulsive skin picking     Need for prophylactic vaccination and inoculation against influenza     High risk OTC medication or supplement use     Rotator cuff tear     Morbid obesity due to excess calories (H)     Chronic bilateral low back pain without sciatica     Primary osteoarthritis of both hips     Schizophrenia, unspecified type (H)     Uncontrolled type 2 diabetes mellitus with stage 3 chronic kidney disease, with long-term current use of insulin (H)     Endometrial cancer determined by uterine biopsy (H)     Impetigo     Past Surgical History:   Procedure Laterality Date     cataract      bilateral     CYSTOSCOPY N/A 12/29/2016    Procedure: CYSTOSCOPY;  Surgeon: Franca Rousseau MD;  [facility]     DAVINCI HYSTERECTOMY TOTAL, BILATERAL SALPINGO-OOPHORECTOMY, COMBINED N/A 12/29/2016    Procedure: COMBINED DAVINCI HYSTERECTOMY TOTAL, SALPINGO-OOPHORECTOMY;  Surgeon: Franca Rousseau MD;  Location: [facility]     LAPAROSCOPY      for endometriosis     OOPHORECTOMY      right     WRIST SURGERY      right       Social History   Substance Use Topics     Smoking status: Former Smoker     Packs/day: 1.00     Years: 0.00     Types: Cigarettes     Quit date: 1/1/1990     Smokeless tobacco: Never Used      Comment: quit 1990     Alcohol use No     Family History   Problem Relation Age of Onset     HEART DISEASE Mother      DIABETES Mother      Hypertension Mother      Psychotic Disorder Mother      schitzophrenia     Hypertension Father      Blood  Disease Father      high iron level     HEART DISEASE Maternal Grandmother      CEREBROVASCULAR DISEASE Maternal Grandmother      HEART DISEASE Maternal Grandfather      Breast Cancer Paternal Grandmother      Breast Cancer Sister      HEART DISEASE Brother      Ovarian Cancer Paternal Aunt      Kidney Cancer Daughter      Uterine Cancer Daughter      Uterine Cancer Sister          Current Outpatient Prescriptions   Medication Sig Dispense Refill     insulin glargine U-300 (TOUJEO SOLOSTAR) 300 UNIT/ML injection Inject 96 Units Subcutaneous At Bedtime 4.5 mL 2     acetaminophen (TYLENOL) 500 MG tablet Take 500 mg by mouth       traMADol (ULTRAM) 50 MG tablet 50 mg  1     LORazepam (ATIVAN) 0.5 MG tablet 0.5 mg Reported on 4/10/2017  0     Nystatin (NYSTOP) 747614 UNIT/GM POWD powder APPLY TO GROIN AND UNDER BREAST TWICE DAILY UNTIL REDNESS HAS RESOLVED. 15 g 0     valACYclovir (VALTREX) 500 MG tablet TAKE ONE TABLET BY MOUTH ONE TIME DAILY 90 tablet 1     diphenhydrAMINE (BENADRYL) 25 MG tablet Take 25 mg by mouth At Bedtime And 25 mg nightly as needed       cholecalciferol 1000 UNITS TABS Take 1 tablet by mouth daily       ziprasidone (GEODON) 80 MG capsule Take 160 mg by mouth every morning And 80 mg daily at 10 AM, and 80 mg nightly as needed       senna-docusate (SENOKOT-S;PERICOLACE) 8.6-50 MG per tablet Take 2 tablets by mouth 2 times daily as needed for constipation       insulin lispro (HUMALOG KWIKPEN) 100 UNIT/ML injection Inject 15 Units Subcutaneous 3 times daily (before meals)       ONE TOUCH VERIO IQ test strip TEST THREE TIMES DAILY OR AS DIRECTED 100 strip 9     valsartan-hydrochlorothiazide (DIOVAN-HCT) 160-25 MG per tablet TAKE 1 TABLET BY MOUTH DAILY 90 tablet 1     B-D U/F 31G X 8 MM insulin pen needle USE FOUR TIMES DAILY AS DIRECTED WITH LANTUS AND PREMEAL INSULIN 200 each 3     blood glucose monitoring (ONE TOUCH DELICA) lancets Use to test blood sugars 3 times daily or as directed. Qty of 1  "box= 100 lancets 1 Box 1     order for DME Equipment being ordered: corset to prevent compulsive skin picking of abdomen 1 Device 0     order for DME Equipment being ordered: Lift Chair 1 each 0     pravastatin (PRAVACHOL) 40 MG tablet TAKE ONE TABLET BY MOUTH ONE TIME DAILY 90 tablet 3     aspirin 81 MG tablet Take 1 tablet (81 mg) by mouth daily 100 tablet 3     ORDER FOR DME Equipment being ordered: CPAP machine set at 15 pressure, heated humidifier, supplies: mask and tubing ( supplies) 1 Device 1     ORDER FOR DME Equipment being ordered: CPAP and associated supplies and tubing 1 Device 0     PAROXETINE HCL 40 MG OR TABS Take 80 mg by mouth daily. 80mg=2 tablets.        Labs reviewed in EPIC    Reviewed and updated as needed this visit by clinical staff  Tobacco  Allergies  Meds  Med Hx  Surg Hx  Fam Hx  Soc Hx      Reviewed and updated as needed this visit by Provider         ROS:  Constitutional, HEENT, cardiovascular, pulmonary, GI, , musculoskeletal, neuro, skin, endocrine and psych systems are negative, except as otherwise noted.    This document serves as a record of the services and decisions personally performed and made by Mateus Roberson MD. It was created on their behalf by Modesto Morris, a trained medical scribe. The creation of this document is based the provider's statements to the medical scribe.  Modesto Morris April 18, 2017 11:35 AM    OBJECTIVE:                                                    /60 (BP Location: Right arm, Patient Position: Chair, Cuff Size: Adult Large)  Pulse 128  Temp 97.6  F (36.4  C) (Oral)  Ht 1.63 m (5' 4.17\")  Wt (!) 141.5 kg (312 lb)  LMP 03/24/2010  SpO2 95%  Breastfeeding? No  BMI 53.27 kg/m2  Body mass index is 53.27 kg/(m^2).  GENERAL: healthy, alert and no distress, morbid obesity  EYES: Eyes grossly normal to inspection, PERRL and conjunctivae and sclerae normal  NECK: no adenopathy, no asymmetry, masses, or scars and thyroid normal to " palpation  SKIN: no suspicious lesions or rashes to visible skin  NEURO: mentation intact and speech normal  PSYCH: mentation appears abnormal, affect normal/bright, odd affect consistent with schizophrenia         ASSESSMENT/PLAN:                                                      (E11.22,  E11.65,  N18.3,  Z79.4) Uncontrolled type 2 diabetes mellitus with stage 3 chronic kidney disease, with long-term current use of insulin (H)  (primary encounter diagnosis)  Comment: actually because of her recent surgery and weight loss , she has the best controlled diabetes mellitus for many years , we will continue current plan of care    Plan: Hemoglobin A1c, insulin glargine U-300 (TOUJEO         SOLOSTAR) 300 UNIT/ML injection, Basic         metabolic panel            (M79.1) Right buttock pain  Comment: suspect this is more of a soft tissue pain but underlying hip osteoarthritis is certainly in the differential diagnosis . She wants to just try physical therapy at this point which is a logical starting point   Plan: [facility]            (M16.0) Primary osteoarthritis of both hips  Comment: as above   Plan: [facility]            (C54.1,  Z15.04) Endometrial cancer determined by uterine biopsy (H)  Comment: noted as a point of historical importance   Plan: see oncologist notes    (N18.3) CKD (chronic kidney disease) stage 3, GFR 30-59 ml/min  Comment:   GFR Estimate   Date Value Ref Range Status   03/03/2017 43 (L) >60 mL/min/1.7m2 Final     Comment:     Non  GFR Calc   11/11/2016 41 (L) >60 mL/min/1.7m2 Final     Comment:     Non  GFR Calc   03/25/2016 50 (L) >60 mL/min/1.7m2 Final     Comment:     Non  GFR Calc     GFR Estimate If Black   Date Value Ref Range Status   03/03/2017 52 (L) >60 mL/min/1.7m2 Final     Comment:      GFR Calc   11/11/2016 49 (L) >60 mL/min/1.7m2 Final     Comment:       GFR Calc   03/25/2016 61 >60 mL/min/1.7m2 Final     Comment:      GFR Calc       Plan: continue current plan of care   , continue monitoring     (L98.1) Compulsive skin picking  Comment: she had several sores around the face, mostly at the jaw line and not currently showing any signs of infection or impetigo [ usually a streptococcal skin infection ]   Plan: primarily this is from compulsive skin picking I expect and we emphasized the need to avoid any such behavior.    Follow up in 3-6 months     Patient Instructions     Follow up for fasting blood tests and urine tests.    Follow up with your eye doctor for an eye exam.    Follow up with Physical therapy.    I will follow up with you with test results of Kidney function.  [facility]    If you have any questions regarding to your visit please contact your care team:     Team Pink:   Clinic Hours Telephone Number   Internal Medicine:  Dr. Jeanie Decker, NP       7am-7pm  Monday - Thursday   7am-5pm  Fridays  [phone]  (Appointment scheduling available 24/7)    Questions about your visit?  Team Line  [phone]   Urgent Care - [place] and [place] - 11am-9pm Monday-Friday Saturday-Sunday- 9am-5pm   [place] - 5pm-9pm Monday-Friday Saturday-Sunday- 9am-5pm  [phone] - [place]   [phone] - [place]       What options do I have for visits at the clinic other than the traditional office visit?  To expand how we care for you, many of our providers are utilizing electronic visits (e-visits) and telephone visits, when medically appropriate, for interactions with their patients rather than a visit in the clinic.   We also offer nurse visits for many medical concerns. Just like any other service, we will bill your insurance company for this type of visit based on time spent on the phone with your provider. Not all insurance companies cover these visits.  Please check with your medical insurance if this type of visit is covered. You will be responsible for any charges that are not paid by your insurance.      E-visits via [employer]:  generally incur a $35.00 fee.  Telephone visits:  Time spent on the phone: *charged based on time that is spent on the phone in increments of 10 minutes. Estimated cost:   5-10 mins $30.00   11-20 mins. $59.00   21-30 mins. $85.00   Use [employer] (secure email communication and access to your chart) to send your primary care provider a message or make an appointment. Ask someone on your Team how to sign up for [employer].    For a Price Quote for your services, please call our Consumer Price Line at [phone].    As always, Thank you for trusting us with your health care needs!    REGGIE/MA        The information in this document, created by the medical scribe for me, accurately reflects the services I personally performed and the decisions made by me. I have reviewed and approved this document for accuracy.   MD Mateus Eason MD  [facility]

## 2017-04-18 ENCOUNTER — OFFICE VISIT (OUTPATIENT)
Dept: FAMILY MEDICINE | Facility: CLINIC | Age: 67
End: 2017-04-18
Payer: MEDICARE

## 2017-04-18 VITALS
DIASTOLIC BLOOD PRESSURE: 60 MMHG | OXYGEN SATURATION: 95 % | WEIGHT: 293 LBS | BODY MASS INDEX: 50.02 KG/M2 | HEART RATE: 128 BPM | TEMPERATURE: 97.6 F | SYSTOLIC BLOOD PRESSURE: 124 MMHG | HEIGHT: 64 IN

## 2017-04-18 DIAGNOSIS — C54.1 ENDOMETRIAL CANCER DETERMINED BY UTERINE BIOPSY (H): ICD-10-CM

## 2017-04-18 DIAGNOSIS — Z53.9 DIAGNOSIS NOT YET DEFINED: Primary | ICD-10-CM

## 2017-04-18 DIAGNOSIS — F42.4 COMPULSIVE SKIN PICKING: ICD-10-CM

## 2017-04-18 DIAGNOSIS — N18.30 CKD (CHRONIC KIDNEY DISEASE) STAGE 3, GFR 30-59 ML/MIN (H): ICD-10-CM

## 2017-04-18 DIAGNOSIS — M79.18 RIGHT BUTTOCK PAIN: ICD-10-CM

## 2017-04-18 DIAGNOSIS — M16.0 PRIMARY OSTEOARTHRITIS OF BOTH HIPS: ICD-10-CM

## 2017-04-18 LAB
ANION GAP SERPL CALCULATED.3IONS-SCNC: 12 MMOL/L (ref 3–14)
BUN SERPL-MCNC: 24 MG/DL (ref 7–30)
CALCIUM SERPL-MCNC: 9.4 MG/DL (ref 8.5–10.1)
CHLORIDE SERPL-SCNC: 102 MMOL/L (ref 94–109)
CO2 SERPL-SCNC: 25 MMOL/L (ref 20–32)
CREAT SERPL-MCNC: 1.27 MG/DL (ref 0.52–1.04)
GFR SERPL CREATININE-BSD FRML MDRD: 42 ML/MIN/1.7M2
GLUCOSE SERPL-MCNC: 241 MG/DL (ref 70–99)
HBA1C MFR BLD: 7.1 % (ref 4.3–6)
POTASSIUM SERPL-SCNC: 3.6 MMOL/L (ref 3.4–5.3)
SODIUM SERPL-SCNC: 139 MMOL/L (ref 133–144)

## 2017-04-18 PROCEDURE — 83036 HEMOGLOBIN GLYCOSYLATED A1C: CPT | Performed by: INTERNAL MEDICINE

## 2017-04-18 PROCEDURE — 80048 BASIC METABOLIC PNL TOTAL CA: CPT | Performed by: INTERNAL MEDICINE

## 2017-04-18 PROCEDURE — G0180 MD CERTIFICATION HHA PATIENT: HCPCS | Performed by: INTERNAL MEDICINE

## 2017-04-18 PROCEDURE — 99214 OFFICE O/P EST MOD 30 MIN: CPT | Performed by: INTERNAL MEDICINE

## 2017-04-18 PROCEDURE — 36415 COLL VENOUS BLD VENIPUNCTURE: CPT | Performed by: INTERNAL MEDICINE

## 2017-04-18 RX ORDER — LORAZEPAM 0.5 MG/1
0.5 TABLET ORAL
Qty: 60 TABLET | Refills: 0 | Status: CANCELLED | OUTPATIENT
Start: 2017-04-18

## 2017-04-18 ASSESSMENT — PAIN SCALES - GENERAL: PAINLEVEL: MODERATE PAIN (4)

## 2017-04-18 NOTE — NURSING NOTE
"Chief Complaint   Patient presents with     Diabetes     A1c needed       Initial /60 (BP Location: Right arm, Patient Position: Chair, Cuff Size: Adult Large)  Pulse 128  Temp 97.6  F (36.4  C) (Oral)  Ht 5' 4.17\" (1.63 m)  Wt (!) 312 lb (141.5 kg)  LMP 03/24/2010  SpO2 95%  Breastfeeding? No  BMI 53.27 kg/m2 Estimated body mass index is 53.27 kg/(m^2) as calculated from the following:    Height as of this encounter: 5' 4.17\" (1.63 m).    Weight as of this encounter: 312 lb (141.5 kg).  Medication Reconciliation: complete   K.MOODY/MA      "

## 2017-04-18 NOTE — LETTER
[facility]  [address] [place] [address]    April 19, 2017    Marina Mcdermott inden  [address]    [place] [address]          Dear Derik Gray is a copy of your results. We already reviewed the hemoglobin A1c (diabetes test) at your appointment. Otherwise, the basic metabolic panel shows persistent, but stable chronic kidney disease stage 3 without significant change from prior measurements.     Let's see you back in 3 months Marina Mcdermott.  Take care.    Results for orders placed or performed in visit on 04/18/17   Hemoglobin A1c   Result Value Ref Range    Hemoglobin A1C 7.1 (H) 4.3 - 6.0 %   Basic metabolic panel   Result Value Ref Range    Sodium 139 133 - 144 mmol/L    Potassium 3.6 3.4 - 5.3 mmol/L    Chloride 102 94 - 109 mmol/L    Carbon Dioxide 25 20 - 32 mmol/L    Anion Gap 12 3 - 14 mmol/L    Glucose 241 (H) 70 - 99 mg/dL    Urea Nitrogen 24 7 - 30 mg/dL    Creatinine 1.27 (H) 0.52 - 1.04 mg/dL    GFR Estimate 42 (L) >60 mL/min/1.7m2    GFR Estimate If Black 51 (L) >60 mL/min/1.7m2    Calcium 9.4 8.5 - 10.1 mg/dL     If you have any questions or concerns, please call myself or my nurse at [phone].    Sincerely,      Mateus Roberson MD/casey

## 2017-04-18 NOTE — PATIENT INSTRUCTIONS
Follow up for fasting blood tests and urine tests.    Follow up with your eye doctor for an eye exam.    Follow up with Physical therapy.    I will follow up with you with test results of Kidney function.  [facility]    If you have any questions regarding to your visit please contact your care team:     Team Pink:   Clinic Hours Telephone Number   Internal Medicine:  Dr. Jeanie Decker NP       7am-7pm  Monday - Thursday   7am-5pm  Fridays  [phone]  (Appointment scheduling available 24/7)    Questions about your visit?  Team Line  [phone]   [facility] [place] [facility] [place] 11am-9pm Monday-Friday Saturday-Sunday- 9am-5pm   [place] - 5pm-9pm Monday-Friday Saturday-Sunday- 9am-5pm  [phone] - [place]   [phone] - [place]       What options do I have for visits at the clinic other than the traditional office visit?  To expand how we care for you, many of our providers are utilizing electronic visits (e-visits) and telephone visits, when medically appropriate, for interactions with their patients rather than a visit in the clinic.   We also offer nurse visits for many medical concerns. Just like any other service, we will bill your insurance company for this type of visit based on time spent on the phone with your provider. Not all insurance companies cover these visits. Please check with your medical insurance if this type of visit is covered. You will be responsible for any charges that are not paid by your insurance.      E-visits via [employer]:  generally incur a $35.00 fee.  Telephone visits:  Time spent on the phone: *charged based on time that is spent on the phone in increments of 10 minutes. Estimated cost:   5-10 mins $30.00   11-20 mins. $59.00   21-30 mins. $85.00   Use [employer] (secure email communication and access to your chart) to send your primary care provider a message or make an appointment. Ask someone on  your Team how to sign up for [employer].    For a Price Quote for your services, please call our Consumer Price Line at [phone].    As always, Thank you for trusting us with your health care needs!    REGGIE/MA

## 2017-04-18 NOTE — MR AVS SNAPSHOT
After Visit Summary   4/18/2017    Marina Valdez    MRN: [id number]           Patient Information     Date Of Birth          1950        Visit Information        Provider Department      4/18/2017 11:10 AM Mateus Roberson MD [facility]        Today's Diagnoses     Uncontrolled type 2 diabetes mellitus with stage 3 chronic kidney disease, with long-term current use of insulin (H)    -  1    Right buttock pain        Primary osteoarthritis of both hips        Endometrial cancer determined by uterine biopsy (H)        CKD (chronic kidney disease) stage 3, GFR 30-59 ml/min        Compulsive skin picking          Care Instructions    Follow up for fasting blood tests and urine tests.    Follow up with your eye doctor for an eye exam.    Follow up with Physical therapy.    I will follow up with you with test results of Kidney function.  [facility]    If you have any questions regarding to your visit please contact your care team:     Team Pink:   Clinic Hours Telephone Number   Internal Medicine:  Dr. Jeanie Decker NP       7am-7pm  Monday - Thursday   7am-5pm  Fridays  [phone]  (Appointment scheduling available 24/7)    Questions about your visit?  Team Line  [phone]   Urgent Care - [place] - 11am-9pm Monday-Friday Saturday-Sunday- 9am-5pm   [place] - 5pm-9pm Monday-Friday Saturday-Sunday- 9am-5pm  [phone] - [place]   [phone] - [place]       What options do I have for visits at the clinic other than the traditional office visit?  To expand how we care for you, many of our providers are utilizing electronic visits (e-visits) and telephone visits, when medically appropriate, for interactions with their patients rather than a visit in the clinic.   We also offer nurse visits for many medical concerns. Just like any other service, we will bill your insurance company for this type  of visit based on time spent on the phone with your provider. Not all insurance companies cover these visits. Please check with your medical insurance if this type of visit is covered. You will be responsible for any charges that are not paid by your insurance.      E-visits via Sotmarkethart:  generally incur a $35.00 fee.  Telephone visits:  Time spent on the phone: *charged based on time that is spent on the phone in increments of 10 minutes. Estimated cost:   5-10 mins $30.00   11-20 mins. $59.00   21-30 mins. $85.00   Use Xtalic (secure email communication and access to your chart) to send your primary care provider a message or make an appointment. Ask someone on your Team how to sign up for Xtalic.    For a Price Quote for your services, please call our [employer] Price Line at [phone].    As always, Thank you for trusting us with your health care needs!    REGGIE/MA          Follow-ups after your visit        Additional Services     [facility] PT, HAND, AND CHIROPRACTIC REFERRAL       **This order will print in the [facility] Scheduling Office**    Physical Therapy, Hand Therapy and Chiropractic Care are available through:    *[facility]  *[facility]  *[facility]    Call one number to schedule at any of the above locations: [phone].    Your provider has referred you to: Physical Therapy at [facility] or [facility]    Indication/Reason for Referral: see office visit notes for today   Onset of Illness: about 3 months   Therapy Orders: Evaluate and Treat  Special Programs: None  Special Request: None    Mukul Gilman      Additional Comments for the Therapist or Chiropractor: see office visit notes     Please be aware that coverage of these services is subject to the terms and limitations of your health insurance plan.  Call member services at your health plan with any benefit or coverage questions.      Please bring the following to your appointment:    *Your personal  "calendar for scheduling future appointments  *Comfortable clothing                  Who to contact     If you have questions or need follow up information about today's clinic visit or your schedule please contact [facility] directly at [phone].  Normal or non-critical lab and imaging results will be communicated to you by MyChart, letter or phone within 4 business days after the clinic has received the results. If you do not hear from us within 7 days, please contact the clinic through MyChart or phone. If you have a critical or abnormal lab result, we will notify you by phone as soon as possible.  Submit refill requests through HealthUnlocked or call your pharmacy and they will forward the refill request to us. Please allow 3 business days for your refill to be completed.          Additional Information About Your Visit        [employer] Managed Services Information     HealthUnlocked lets you send messages to your doctor, view your test results, renew your prescriptions, schedule appointments and more. To sign up, go to [email or web address] . Click on \"Log in\" on the left side of the screen, which will take you to the Welcome page. Then click on \"Sign up Now\" on the right side of the page.     You will be asked to enter the access code listed below, as well as some personal information. Please follow the directions to create your username and password.     Your access code is: [email or web address]  Expires: 2017  4:47 PM     Your access code will  in 90 days. If you need help or a new code, please call your [place] clinic or [phone].        Care EveryWhere ID     This is your Care EveryWhere ID. This could be used by other organizations to access your [place] medical records  [id number]        Your Vitals Were     Pulse Temperature Height Last Period Pulse Oximetry Breastfeeding?    128 97.6  F (36.4  C) (Oral) 5' 4.17\" (1.63 m) 2010 95% No    BMI (Body Mass Index)                   53.27 kg/m2            " Blood Pressure from Last 3 Encounters:   04/18/17 124/60   04/10/17 127/74   03/03/17 130/82    Weight from Last 3 Encounters:   04/18/17 (!) 312 lb (141.5 kg)   04/10/17 (!) 320 lb 6.4 oz (145.3 kg)   03/03/17 (!) 326 lb (147.9 kg)              We Performed the Following     Basic metabolic panel     Hemoglobin A1c     [facility] PT, HAND, AND CHIROPRACTIC REFERRAL          Today's Medication Changes          These changes are accurate as of: 4/18/17 11:51 AM.  If you have any questions, ask your nurse or doctor.               These medicines have changed or have updated prescriptions.        Dose/Directions    insulin glargine U-300 300 UNIT/ML injection   Commonly known as:  TOUJEO SOLOSTAR   This may have changed:  See the new instructions.   Used for:  Uncontrolled type 2 diabetes mellitus with stage 3 chronic kidney disease, with long-term current use of insulin (H)   Changed by:  Mateus Roberson MD        Dose:  96 Units   Inject 96 Units Subcutaneous At Bedtime   Quantity:  4.5 mL   Refills:  2            Where to get your medicines      These medications were sent to [place] [address] [facility] - [place], [place] - [address]  [address] [address]     Phone:  [phone]     insulin glargine U-300 300 UNIT/ML injection                Primary Care Provider Office Phone # Fax #    Mateus Roberson [phone]       [address] [address]        Thank you!     Thank you for choosing [facility]  for your care. Our goal is always to provide you with excellent care. Hearing back from our patients is one way we can continue to improve our services. Please take a few minutes to complete the written survey that you may receive in the mail after your visit with us. Thank you!             Your Updated Medication List - Protect others around you: Learn how to safely use, store and throw away your medicines at  www.disposemymeds.org.          This list is accurate as of: 4/18/17 11:51 AM.  Always use your most recent med list.                   Brand Name Dispense Instructions for use    acetaminophen 500 MG tablet    TYLENOL     Take 500 mg by mouth       aspirin 81 MG tablet     100 tablet    Take 1 tablet (81 mg) by mouth daily       B-D U/F 31G X 8 MM   Generic drug:  insulin pen needle     200 each    USE FOUR TIMES DAILY AS DIRECTED WITH LANTUS AND PREMEAL INSULIN       BENADRYL 25 MG tablet   Generic drug:  diphenhydrAMINE      Take 25 mg by mouth At Bedtime And 25 mg nightly as needed       blood glucose monitoring lancets     1 Box    Use to test blood sugars 3 times daily or as directed. Qty of 1 box= 100 lancets       cholecalciferol 1000 UNITS Tabs      Take 1 tablet by mouth daily       GEODON 80 MG capsule   Generic drug:  ziprasidone      Take 160 mg by mouth every morning And 80 mg daily at 10 AM, and 80 mg nightly as needed       HumaLOG KWIKpen 100 UNIT/ML injection   Generic drug:  insulin lispro      Inject 15 Units Subcutaneous 3 times daily (before meals)       insulin glargine U-300 300 UNIT/ML injection    TOUJEO SOLOSTAR    4.5 mL    Inject 96 Units Subcutaneous At Bedtime       LORazepam 0.5 MG tablet    ATIVAN     0.5 mg Reported on 4/10/2017       NYSTOP 576134 UNIT/GM Powd   Generic drug:  nystatin     15 g    APPLY TO GROIN AND UNDER BREAST TWICE DAILY UNTIL REDNESS HAS RESOLVED.       ONE TOUCH VERIO IQ test strip   Generic drug:  blood glucose monitoring     100 strip    TEST THREE TIMES DAILY OR AS DIRECTED       * order for DME     1 Device    Equipment being ordered: CPAP and associated supplies and tubing       * order for DME     1 Device    Equipment being ordered: CPAP machine set at 15 pressure, heated humidifier, supplies: mask and tubing ( supplies)       * order for DME     1 each    Equipment being ordered: Lift Chair       * order for DME     1 Device    Equipment being ordered:  corset to prevent compulsive skin picking of abdomen       PARoxetine 40 MG tablet    PAXIL     Take 80 mg by mouth daily. 80mg=2 tablets.       pravastatin 40 MG tablet    PRAVACHOL    90 tablet    TAKE ONE TABLET BY MOUTH ONE TIME DAILY       senna-docusate 8.6-50 MG per tablet    SENOKOT-S;PERICOLACE     Take 2 tablets by mouth 2 times daily as needed for constipation       traMADol 50 MG tablet    ULTRAM     50 mg       valACYclovir 500 MG tablet    VALTREX    90 tablet    TAKE ONE TABLET BY MOUTH ONE TIME DAILY       valsartan-hydrochlorothiazide 160-25 MG per tablet    DIOVAN-HCT    90 tablet    TAKE 1 TABLET BY MOUTH DAILY       * Notice:  This list has 4 medication(s) that are the same as other medications prescribed for you. Read the directions carefully, and ask your doctor or other care provider to review them with you.

## 2017-04-19 DIAGNOSIS — B37.2 CANDIDAL INTERTRIGO: ICD-10-CM

## 2017-04-19 NOTE — TELEPHONE ENCOUNTER
Nystatin (NYSTOP) [id number] UNIT/GM POWD powder      Last Written Prescription Date: 03/14/2017  Last Fill Quantity: 15g,  # refills: 0   Last Office Visit with [facility], [facility] or [facility] prescribing provider: 04/18/2017                                             Alisha Selby MA

## 2017-04-20 RX ORDER — NYSTATIN 100000 [USP'U]/G
POWDER TOPICAL
Qty: 15 G | Refills: 0 | Status: SHIPPED | OUTPATIENT
Start: 2017-04-20 | End: 2017-06-21

## 2017-04-20 NOTE — TELEPHONE ENCOUNTER
Prescription approved per [facility] Refill Protocol.  Nadia Calderon, RN - BC

## 2017-04-21 ENCOUNTER — TELEPHONE (OUTPATIENT)
Dept: FAMILY MEDICINE | Facility: CLINIC | Age: 67
End: 2017-04-21

## 2017-04-21 NOTE — TELEPHONE ENCOUNTER
Reason for Call:  Other call back    Detailed comments: Dori from [facility] states they need the PT order to be fax to them as well as a face to face.     Fax number: [phone]    Phone Number Patient can be reached at: Other phone number:  [phone]     Best Time: any    Can we leave a detailed message on this number? YES    Call taken on 4/21/2017 at 3:29 PM by Jhonny Abdalla

## 2017-04-28 ENCOUNTER — THERAPY VISIT (OUTPATIENT)
Dept: PHYSICAL THERAPY | Facility: CLINIC | Age: 67
End: 2017-04-28
Payer: MEDICARE

## 2017-04-28 DIAGNOSIS — M79.18 RIGHT BUTTOCK PAIN: ICD-10-CM

## 2017-04-28 DIAGNOSIS — M25.551 BILATERAL HIP PAIN: Primary | ICD-10-CM

## 2017-04-28 DIAGNOSIS — M25.552 BILATERAL HIP PAIN: Primary | ICD-10-CM

## 2017-04-28 PROCEDURE — 97110 THERAPEUTIC EXERCISES: CPT | Mod: GP | Performed by: PHYSICAL THERAPIST

## 2017-04-28 PROCEDURE — G8982 BODY POS GOAL STATUS: HCPCS | Mod: GP | Performed by: PHYSICAL THERAPIST

## 2017-04-28 PROCEDURE — G8981 BODY POS CURRENT STATUS: HCPCS | Mod: GP | Performed by: PHYSICAL THERAPIST

## 2017-04-28 PROCEDURE — 97112 NEUROMUSCULAR REEDUCATION: CPT | Mod: GP | Performed by: PHYSICAL THERAPIST

## 2017-04-28 PROCEDURE — 97161 PT EVAL LOW COMPLEX 20 MIN: CPT | Mod: GP | Performed by: PHYSICAL THERAPIST

## 2017-04-28 ASSESSMENT — ACTIVITIES OF DAILY LIVING (ADL)
TWISTING/PIVOTING_ON_INVOLVED_LEG: EXTREME DIFFICULTY
WALKING_INITIALLY: EXTREME DIFFICULTY
LIGHT_TO_MODERATE_WORK: EXTREME DIFFICULTY
SITTING_FOR_15_MINUTES: SLIGHT DIFFICULTY
HEAVY_WORK: EXTREME DIFFICULTY
PUTTING_ON_SOCKS_AND_SHOES: EXTREME DIFFICULTY
STANDING_FOR_15_MINUTES: EXTREME DIFFICULTY
GETTING_INTO_AND_OUT_OF_A_BATHTUB: EXTREME DIFFICULTY
HOS_ADL_ITEM_SCORE_TOTAL: 17
GOING_UP_1_FLIGHT_OF_STAIRS: EXTREME DIFFICULTY
HOS_ADL_COUNT: 17
STEPPING_UP_AND_DOWN_CURBS: EXTREME DIFFICULTY
WALKING_15_MINUTES_OR_GREATER: EXTREME DIFFICULTY
WALKING_UP_STEEP_HILLS: EXTREME DIFFICULTY
GETTING_INTO_AND_OUT_OF_AN_AVERAGE_CAR: EXTREME DIFFICULTY
WALKING_DOWN_STEEP_HILLS: EXTREME DIFFICULTY
RECREATIONAL_ACTIVITIES: EXTREME DIFFICULTY
GOING_DOWN_1_FLIGHT_OF_STAIRS: EXTREME DIFFICULTY
HOS_ADL_SCORE(%): 25
HOS_ADL_HIGHEST_POTENTIAL_SCORE: 68
ROLLING_OVER_IN_BED: EXTREME DIFFICULTY
DEEP_SQUATTING: EXTREME DIFFICULTY
WALKING_APPROXIMATELY_10_MINUTES: EXTREME DIFFICULTY

## 2017-04-28 NOTE — PROGRESS NOTES
Subjective:    Patient is a 66 year old female presenting with rehab right hip hpi.   Marina Valdez is a 66 year old female with a bilateral hips (right buttock pain) condition.  Condition occurred with:  Insidious onset.    This is a chronic condition  Patient reports onset of right sided buttock pain and bilateral hip pain for 3 months without a specific mechanism of injury or change in daily routine..    Patient reports pain:  Posterior.    Pain is described as aching and is constant and reported as 4/10.  Associated symptoms:  Loss of motion/stiffness and loss of strength.   Symptoms are exacerbated by sitting, descending stairs, ascending stairs and transfers (getting up from supine position) Relieved by: sitting in recliner.  Since onset symptoms are unchanged.  Special testing: none.      General health as reported by patient is good.                      Red flags:  None as reported by the patient.                        Objective:    System         Lumbar/SI Evaluation  ROM:    AROM Lumbar:   Flexion:            Hand to knees, painful  Ext:                    Moderate limitation,   Side Bend:        Left:  Hand to knee    Right:  Hand to knee  Rotation:           Left:  WNL    Right:  WNL  Side Glide:        Left:     Right:           Lumbar Myotomes:  Lumbar myotomes: grossly 4/5 bilaterally.                Lumbar Dermtomes:  normal                Neural Tension/Mobility:      Right side:   Slump and SLR positive.  Lumbar Palpation:      Tenderness present at Right: Piriformis and Gluteus Medius                                                       Denis Lumbar Evaluation        Test Movements:  FIS: During: produces  After: no effect  Mechanical Response: no effect  Repeat FIS: During: produces  Mechanical Response: no effect  EIS: During: abolishes  After: no better  Mechanical Response: no effect  Repeat EIS: During: abolishes  After: better  Mechanical Response: IncROM                                                      ROS    Assessment/Plan:      Patient is a 66 year old female with both sides hip complaints.    Patient has the following significant findings with corresponding treatment plan.                Diagnosis 1:  Bilateral hip pain, right buttock pain  Pain -  hot/cold therapy, self management, education, directional preference exercise and home program  Decreased ROM/flexibility - manual therapy, therapeutic exercise, therapeutic activity and home program  Decreased strength - therapeutic exercise, therapeutic activities and home program  Decreased function - therapeutic activities and home program  Impaired posture - neuro re-education, therapeutic activities and home program    Therapy Evaluation Codes:   1) History comprised of:   Personal factors that impact the plan of care:      Overall behavior pattern and Social history/culture.    Comorbidity factors that impact the plan of care are:      Mental illness and Overweight.     Medications impacting care: None.  2) Examination of Body Systems comprised of:   Body structures and functions that impact the plan of care:      Hip.   Activity limitations that impact the plan of care are:      Bathing, Bending, Dressing, Sitting and Laying down.  3) Clinical presentation characteristics are:   Stable/Uncomplicated.  4) Decision-Making    Low complexity using standardized patient assessment instrument and/or measureable assessment of functional outcome.  Cumulative Therapy Evaluation is: Low complexity.    Previous and current functional limitations:  (See Goal Flow Sheet for this information)    Short term and Long term goals: (See Goal Flow Sheet for this information)     Communication ability:  Patient appears to be able to clearly communicate and understand verbal and written communication and follow directions correctly.  Treatment Explanation - The following has been discussed with the patient:   RX ordered/plan of care  Anticipated  outcomes  Possible risks and side effects  This patient would benefit from PT intervention to resume normal activities.   Rehab potential is good.    Frequency:  1 X week, once daily  Duration:  for 4 weeks  Discharge Plan:  Achieve all LTG.  Independent in home treatment program.  Reach maximal therapeutic benefit.    Please refer to the daily flowsheet for treatment today, total treatment time and time spent performing 1:1 timed codes.

## 2017-04-28 NOTE — PROGRESS NOTES
Subjective:    Patient is a 66 year old female presenting with rehab left ankle/foot hpi.                                      Pertinent medical history includes:  History of fractures, diabetes, overweight, cancer, high blood pressure, heart problems, depression, mental illness, implanted device, kidney disease, sleep disorder/apnea and other.  Medical allergies: no.  Other surgeries include:  Orthopedic surgery, cancer surgery and other.  Current medications:  Sleep medication, pain medication, muscle relaxants, high blood pressure medication and anti-depressants.  Current occupation is disabled.                                    Objective:    System    Physical Exam    General     ROS    Assessment/Plan:

## 2017-04-28 NOTE — MR AVS SNAPSHOT
"              After Visit Summary   4/28/2017    Marina Valdez    MRN: [id number]           Patient Information     Date Of Birth          1950        Visit Information        Provider Department      4/28/2017 11:40 AM Isaiah Mcqueen, PT [facility]        Today's Diagnoses     Bilateral hip pain    -  1    Right buttock pain           Follow-ups after your visit        Your next 10 appointments already scheduled     May 03, 2017 11:20 AM CDT   [facility] Extremity with Isaiah Mcqueen, PT   [facility] ([facility] [place])    [address]  [address]  [place] [address]   [phone]            May 10, 2017 11:20 AM CDT   CL[facility]RE Extremity with Isaiah Mcqueen, PT   [facility] ([facility] [place])    [address]  [address]  [place] [id number] [phone]              Who to contact     If you have questions or need follow up information about today's clinic visit or your schedule please contact [facility] directly at [phone].  Normal or non-critical lab and imaging results will be communicated to you by RingCrediblehart, letter or phone within 4 business days after the clinic has received the results. If you do not hear from us within 7 days, please contact the clinic through RingCrediblehart or phone. If you have a critical or abnormal lab result, we will notify you by phone as soon as possible.  Submit refill requests through Introhive or call your pharmacy and they will forward the refill request to us. Please allow 3 business days for your refill to be completed.          Additional Information About Your Visit        RingCrediblehart Information     Introhive lets you send messages to your doctor, view your test results, renew your prescriptions, schedule appointments and more. To sign up, go to [email or web address] . Click on \"Log in\" on the left side of the screen, which will take you to the Welcome page. Then click on \"Sign up Now\" on the right side of the page.     You will be asked to " enter the access code listed below, as well as some personal information. Please follow the directions to create your username and password.     Your access code is: [email or web address]  Expires: 2017  4:47 PM     Your access code will  in 90 days. If you need help or a new code, please call your [place] clinic or [phone].        Care EveryWhere ID     This is your Care EveryWhere ID. This could be used by other organizations to access your [place] medical records  [id number]        Your Vitals Were     Last Period                   2010            Blood Pressure from Last 3 Encounters:   17 124/60   04/10/17 127/74   17 130/82    Weight from Last 3 Encounters:   17 (!) 141.5 kg (312 lb)   04/10/17 (!) 145.3 kg (320 lb 6.4 oz)   17 (!) 147.9 kg (326 lb)              We Performed the Following     [facility] CERT REPORT     Neuromuscular Re-Education     PT Jonnie, Low Complexity ([id number])     Therapeutic Exercises        Primary Care Provider Office Phone # Fax #    Mateus Roberson [phone]       [address] [address]        Thank you!     Thank you for choosing [facility]  for your care. Our goal is always to provide you with excellent care. Hearing back from our patients is one way we can continue to improve our services. Please take a few minutes to complete the written survey that you may receive in the mail after your visit with us. Thank you!             Your Updated Medication List - Protect others around you: Learn how to safely use, store and throw away your medicines at www.disposemymeds.org.          This list is accurate as of: 17  1:48 PM.  Always use your most recent med list.                   Brand Name Dispense Instructions for use    acetaminophen 500 MG tablet    TYLENOL     Take 500 mg by mouth       aspirin 81 MG tablet     100 tablet    Take 1 tablet (81 mg) by mouth daily       B-D U/F 31G X 8  MM   Generic drug:  insulin pen needle     200 each    USE FOUR TIMES DAILY AS DIRECTED WITH LANTUS AND PREMEAL INSULIN       BENADRYL 25 MG tablet   Generic drug:  diphenhydrAMINE      Take 25 mg by mouth At Bedtime And 25 mg nightly as needed       blood glucose monitoring lancets     1 Box    Use to test blood sugars 3 times daily or as directed. Qty of 1 box= 100 lancets       cholecalciferol 1000 UNITS Tabs      Take 1 tablet by mouth daily       GEODON 80 MG capsule   Generic drug:  ziprasidone      Take 160 mg by mouth every morning And 80 mg daily at 10 AM, and 80 mg nightly as needed       HumaLOG KWIKpen 100 UNIT/ML injection   Generic drug:  insulin lispro      Inject 15 Units Subcutaneous 3 times daily (before meals)       insulin glargine U-300 300 UNIT/ML injection    TOUJEO SOLOSTAR    4.5 mL    Inject 96 Units Subcutaneous At Bedtime       LORazepam 0.5 MG tablet    ATIVAN     0.5 mg Reported on 4/10/2017       NYSTOP [id number] UNIT/GM Powd   Generic drug:  nystatin     15 g    APPLY TO GROIN AND UNDER BREAST TWICE DAILY UNTIL REDNESS HAS RESOLVED.       ONE TOUCH VERIO IQ test strip   Generic drug:  blood glucose monitoring     100 strip    TEST THREE TIMES DAILY OR AS DIRECTED       * order for DME     1 Device    Equipment being ordered: CPAP and associated supplies and tubing       * order for DME     1 Device    Equipment being ordered: CPAP machine set at 15 pressure, heated humidifier, supplies: mask and tubing ( supplies)       * order for DME     1 each    Equipment being ordered: Lift Chair       * order for DME     1 Device    Equipment being ordered: corset to prevent compulsive skin picking of abdomen       PARoxetine 40 MG tablet    PAXIL     Take 80 mg by mouth daily. 80mg=2 tablets.       pravastatin 40 MG tablet    PRAVACHOL    90 tablet    TAKE ONE TABLET BY MOUTH ONE TIME DAILY       senna-docusate 8.6-50 MG per tablet    SENOKOT-S;PERICOLACE     Take 2 tablets by mouth 2 times daily  as needed for constipation       traMADol 50 MG tablet    ULTRAM     50 mg       valACYclovir 500 MG tablet    VALTREX    90 tablet    TAKE ONE TABLET BY MOUTH ONE TIME DAILY       valsartan-hydrochlorothiazide 160-25 MG per tablet    DIOVAN-HCT    90 tablet    TAKE 1 TABLET BY MOUTH DAILY       * Notice:  This list has 4 medication(s) that are the same as other medications prescribed for you. Read the directions carefully, and ask your doctor or other care provider to review them with you.

## 2017-04-28 NOTE — LETTER
DEPARTMENT OF HEALTH AND HUMAN SERVICES  CENTERS FOR MEDICARE & MEDICAID SERVICES    PLAN/UPDATED PLAN OF PROGRESS FOR OUTPATIENT REHABILITATION    PATIENTS NAME:  Marina Valdez   : 1950  [facility] NUMBER:    [id number]  [facility]  PROVIDER NAME: [facility]  MEDICAL RECORD NUMBER: [id number]     START OF CARE DATE:  SOC Date: 17   TYPE:  PT    PRIMARY/TREATMENT DIAGNOSIS: (Pertinent Medical Diagnosis)     Bilateral hip pain  Right buttock pain    VISITS FROM START OF CARE:  Rxs Used: 1     Subjective:  Patient is a 66 year old female presenting with rehab right hip hpi.   Marina Valdez is a 66 year old female with a bilateral hips (right buttock pain) condition.  Condition occurred with:  Insidious onset.    This is a chronic condition  Patient reports onset of right sided buttock pain and bilateral hip pain for 3 months without a specific mechanism of injury or change in daily routine.. Patient reports pain:  Posterior.  Pain is described as aching and is constant and reported as 4/10.  Associated symptoms:  Loss of motion/stiffness and loss of strength.   Symptoms are exacerbated by sitting, descending stairs, ascending stairs and transfers (getting up from supine position) Relieved by: sitting in recliner.  Since onset symptoms are unchanged.  Special testing: none. General health as reported by patient is good.           MD order 2017.    Objective:  Lumbar/SI Evaluation  ROM:    AROM Lumbar:   Flexion:            Hand to knees, painful  Ext:                    Moderate limitation,   Side Bend:        Left:  Hand to knee    Right:  Hand to knee  Rotation:           Left:  WNL    Right:  WNL  Side Glide:        Left:     Right:   Lumbar Myotomes:  Lumbar myotomes: grossly 4/5 bilaterally.  Lumbar Dermtomes:  normal  Neural Tension/Mobility:    Right side:   Slump and SLR positive.  Lumbar Palpation:    Tenderness present at Right: Piriformis and Gluteus Medius        PATIENTS  NAME:  Marina Valdez   : 1950    Denis Lumbar Evaluation  Test Movements:  FIS: During: produces  After: no effect  Mechanical Response: no effect  Repeat FIS: During: produces  Mechanical Response: no effect  EIS: During: abolishes  After: no better  Mechanical Response: no effect  Repeat EIS: During: abolishes  After: better  Mechanical Response: IncROM      Red flags:  None as reported by the patient.    Assessment/Plan:    Patient is a 66 year old female with both sides hip complaints.    Patient has the following significant findings with corresponding treatment plan.                Diagnosis 1:  Bilateral hip pain, right buttock pain  Pain -  hot/cold therapy, self management, education, directional preference exercise and home program  Decreased ROM/flexibility - manual therapy, therapeutic exercise, therapeutic activity and home program  Decreased strength - therapeutic exercise, therapeutic activities and home program  Decreased function - therapeutic activities and home program  Impaired posture - neuro re-education, therapeutic activities and home program    Therapy Evaluation Codes:   1) History comprised of:   Personal factors that impact the plan of care:      Overall behavior pattern and Social history/culture.    Comorbidity factors that impact the plan of care are:      Mental illness and Overweight.     Medications impacting care: None.  2) Examination of Body Systems comprised of:   Body structures and functions that impact the plan of care:      Hip.   Activity limitations that impact the plan of care are:      Bathing, Bending, Dressing, Sitting and Laying down.  3) Clinical presentation characteristics are:   Stable/Uncomplicated.  4) Decision-Making    Low complexity using standardized patient assessment instrument and/or measureable assessment of functional outcome.  Cumulative Therapy Evaluation is: Low complexity.    Previous and current functional limitations:  (See Goal Flow  "Sheet for this information)    Short term and Long term goals: (See Goal Flow Sheet for this information)     Communication ability:  Patient appears to be able to clearly communicate and understand verbal and written communication and follow directions correctly.  Treatment Explanation - The following has been discussed with the patient:   RX ordered/plan of care  PATIENTS NAME:  Marina Valdez   : 1950    Anticipated outcomes  Possible risks and side effects  This patient would benefit from PT intervention to resume normal activities.   Rehab potential is good.    Frequency:  1 X week, once daily  Duration:  for 4 weeks  Discharge Plan:  Achieve all LTG.  Independent in home treatment program.  Reach maximal therapeutic benefit.    Please refer to the daily flowsheet for treatment today, total treatment time and time spent performing 1:1 timed codes.         Caregiver Signature/Credentials _____________________________ Date ________       Treating Provider: Isaiah Mcqueen DPT   I have reviewed and certified the need for these services and plan of treatment while under my care.        PHYSICIAN'S SIGNATURE:   _________________________________________  Date___________   Mateus Roberson    Certification period:  Beginning of Cert date period: 17 to  End of Cert period date: 17     Functional Level Progress Report: Please see attached \"Goal Flow sheet for Functional level.\"    ____X____ Continue Services or       ________ DC Services                Service dates: From  SOC Date: 17 date to present                         "

## 2017-05-02 ENCOUNTER — TELEPHONE (OUTPATIENT)
Dept: FAMILY MEDICINE | Facility: CLINIC | Age: 67
End: 2017-05-02

## 2017-05-02 NOTE — TELEPHONE ENCOUNTER
"Spoke to patient in regards to message below. Patient states on her AVS that it states \"Lift Chair Ordered\". Looked on AVS and med list and it does state that but it is an old order that will remain on her med list. Patient states understanding.    Emely Bernal, [facility]    "

## 2017-05-02 NOTE — TELEPHONE ENCOUNTER
Reason for call: med question  Patient called regarding (reason for call): Patient is stating she does not need a left chair. There are other items on her list that she would like to talk about.  Additional comments: Please call patient to discuss further    Phone Number Pt can be reached at: Other phone number:  [phone]*  Best Time: 9-430  Can we leave a detailed message on this number? YES

## 2017-05-03 ENCOUNTER — THERAPY VISIT (OUTPATIENT)
Dept: PHYSICAL THERAPY | Facility: CLINIC | Age: 67
End: 2017-05-03
Payer: MEDICARE

## 2017-05-03 DIAGNOSIS — M79.18 RIGHT BUTTOCK PAIN: Primary | ICD-10-CM

## 2017-05-03 PROCEDURE — G8982 BODY POS GOAL STATUS: HCPCS | Mod: GP | Performed by: PHYSICAL THERAPIST

## 2017-05-03 PROCEDURE — G8981 BODY POS CURRENT STATUS: HCPCS | Mod: GP | Performed by: PHYSICAL THERAPIST

## 2017-05-03 PROCEDURE — G8983 BODY POS D/C STATUS: HCPCS | Mod: GP | Performed by: PHYSICAL THERAPIST

## 2017-05-03 PROCEDURE — 97112 NEUROMUSCULAR REEDUCATION: CPT | Mod: GP | Performed by: PHYSICAL THERAPIST

## 2017-05-03 PROCEDURE — 97110 THERAPEUTIC EXERCISES: CPT | Mod: GP | Performed by: PHYSICAL THERAPIST

## 2017-05-03 NOTE — PROGRESS NOTES
Subjective:    HPI                    Objective:    System    Physical Exam    General     ROS    Assessment/Plan:      DISCHARGE REPORT    Progress reporting period is from 4/28/2017 to 5/3/2017.       SUBJECTIVE  Subjective changes noted by patient: .  Subjective: Patient reports feeling much better after last PT session with a resolution of low back pain. Patient reports no other complaints.      Current pain level is  Current Pain level: 0/10.     Previous pain level was   Initial Pain level: 4/10.   Changes in function:  Yes (See Goal flowsheet attached for changes in current functional level)  Adverse reaction to treatment or activity: None    OBJECTIVE  Changes noted in objective findings:  Yes,   Objective: 30 sec sit<>Stand 12. Unremarkable low back screen.    ASSESSMENT/PLAN  Updated problem list and treatment plan: Diagnosis 1:  Low back pain    STG/LTGs have been met or progress has been made towards goals:  Yes (See Goal flow sheet completed today.)  Assessment of Progress: The patient's condition is improving.  Self Management Plans:  Patient is independent in a home treatment program.  Patient is independent in self management of symptoms.  I have re-evaluated this patient and find that the nature, scope, duration and intensity of the therapy is appropriate for the medical condition of the patient.  Marina continues to require the following intervention to meet STG and LTG's:  PT intervention is no longer required to meet STG/LTG.    Recommendations:  This patient is ready to be discharged from therapy and continue their home treatment program.    Please refer to the daily flowsheet for treatment today, total treatment time and time spent performing 1:1 timed codes.

## 2017-05-03 NOTE — MR AVS SNAPSHOT
"              After Visit Summary   5/3/2017    Marina Valdez    MRN: [id number]           Patient Information     Date Of Birth          1950        Visit Information        Provider Department      5/3/2017 11:20 AM Isaiah Mcqueen, PT [facility]        Today's Diagnoses     Right buttock pain    -  1       Follow-ups after your visit        Who to contact     If you have questions or need follow up information about today's clinic visit or your schedule please contact [facility] directly at [phone].  Normal or non-critical lab and imaging results will be communicated to you by ADmantXhart, letter or phone within 4 business days after the clinic has received the results. If you do not hear from us within 7 days, please contact the clinic through Ombitront or phone. If you have a critical or abnormal lab result, we will notify you by phone as soon as possible.  Submit refill requests through Mobee or call your pharmacy and they will forward the refill request to us. Please allow 3 business days for your refill to be completed.          Additional Information About Your Visit        MyChart Information     Mobee lets you send messages to your doctor, view your test results, renew your prescriptions, schedule appointments and more. To sign up, go to [email or web address] . Click on \"Log in\" on the left side of the screen, which will take you to the Welcome page. Then click on \"Sign up Now\" on the right side of the page.     You will be asked to enter the access code listed below, as well as some personal information. Please follow the directions to create your username and password.     Your access code is: [email or web address]  Expires: 2017  4:47 PM     Your access code will  in 90 days. If you need help or a new code, please call your [place] clinic or [phone].        Care EveryWhere ID     This is your Care EveryWhere ID. This could be used by other organizations to access your " [place] medical records  [id number]        Your Vitals Were     Last Period                   03/24/2010            Blood Pressure from Last 3 Encounters:   04/18/17 124/60   04/10/17 127/74   03/03/17 130/82    Weight from Last 3 Encounters:   04/18/17 (!) 141.5 kg (312 lb)   04/10/17 (!) 145.3 kg (320 lb 6.4 oz)   03/03/17 (!) 147.9 kg (326 lb)              We Performed the Following     Neuromuscular Re-Education     Therapeutic Exercises        Primary Care Provider Office Phone # Fax #    Mateus Roberson [phone]       [facility] [address] [address]        Thank you!     Thank you for choosing [facility]  for your care. Our goal is always to provide you with excellent care. Hearing back from our patients is one way we can continue to improve our services. Please take a few minutes to complete the written survey that you may receive in the mail after your visit with us. Thank you!             Your Updated Medication List - Protect others around you: Learn how to safely use, store and throw away your medicines at www.disposemymeds.org.          This list is accurate as of: 5/3/17 11:55 AM.  Always use your most recent med list.                   Brand Name Dispense Instructions for use    acetaminophen 500 MG tablet    TYLENOL     Take 500 mg by mouth       aspirin 81 MG tablet     100 tablet    Take 1 tablet (81 mg) by mouth daily       B-D U/F 31G X 8 MM   Generic drug:  insulin pen needle     200 each    USE FOUR TIMES DAILY AS DIRECTED WITH LANTUS AND PREMEAL INSULIN       BENADRYL 25 MG tablet   Generic drug:  diphenhydrAMINE      Take 25 mg by mouth At Bedtime And 25 mg nightly as needed       blood glucose monitoring lancets     1 Box    Use to test blood sugars 3 times daily or as directed. Qty of 1 box= 100 lancets       cholecalciferol 1000 UNITS Tabs      Take 1 tablet by mouth daily       GEODON 80 MG capsule   Generic drug:  ziprasidone       Take 160 mg by mouth every morning And 80 mg daily at 10 AM, and 80 mg nightly as needed       HumaLOG KWIKpen 100 UNIT/ML injection   Generic drug:  insulin lispro      Inject 15 Units Subcutaneous 3 times daily (before meals)       insulin glargine U-300 300 UNIT/ML injection    TOUJEO SOLOSTAR    4.5 mL    Inject 96 Units Subcutaneous At Bedtime       LORazepam 0.5 MG tablet    ATIVAN     0.5 mg Reported on 4/10/2017       NYSTOP 273409 UNIT/GM Powd   Generic drug:  nystatin     15 g    APPLY TO GROIN AND UNDER BREAST TWICE DAILY UNTIL REDNESS HAS RESOLVED.       ONE TOUCH VERIO IQ test strip   Generic drug:  blood glucose monitoring     100 strip    TEST THREE TIMES DAILY OR AS DIRECTED       * order for DME     1 Device    Equipment being ordered: CPAP and associated supplies and tubing       * order for DME     1 Device    Equipment being ordered: CPAP machine set at 15 pressure, heated humidifier, supplies: mask and tubing ( supplies)       * order for DME     1 each    Equipment being ordered: Lift Chair       * order for DME     1 Device    Equipment being ordered: corset to prevent compulsive skin picking of abdomen       PARoxetine 40 MG tablet    PAXIL     Take 80 mg by mouth daily. 80mg=2 tablets.       pravastatin 40 MG tablet    PRAVACHOL    90 tablet    TAKE ONE TABLET BY MOUTH ONE TIME DAILY       senna-docusate 8.6-50 MG per tablet    SENOKOT-S;PERICOLACE     Take 2 tablets by mouth 2 times daily as needed for constipation       traMADol 50 MG tablet    ULTRAM     50 mg       valACYclovir 500 MG tablet    VALTREX    90 tablet    TAKE ONE TABLET BY MOUTH ONE TIME DAILY       valsartan-hydrochlorothiazide 160-25 MG per tablet    DIOVAN-HCT    90 tablet    TAKE 1 TABLET BY MOUTH DAILY       * Notice:  This list has 4 medication(s) that are the same as other medications prescribed for you. Read the directions carefully, and ask your doctor or other care provider to review them with you.

## 2017-05-22 RX ORDER — PEN NEEDLE, DIABETIC 31 GX5/16"
NEEDLE, DISPOSABLE MISCELLANEOUS
Qty: 200 EACH | Refills: 3 | Status: SHIPPED | OUTPATIENT
Start: 2017-05-22 | End: 2018-06-04

## 2017-05-22 NOTE — TELEPHONE ENCOUNTER
B-D U/F 31G X 8 MM insulin pen needle      Last Written Prescription Date: 11/8/16  Last Fill Quantity: 200,  # refills: 3   Last Office Visit with [facility], [facility] or [facility] prescribing provider: 4/18/17

## 2017-05-22 NOTE — TELEPHONE ENCOUNTER
Prescription approved per [facility] Refill Protocol.  Clementine Figueredo RN

## 2017-05-23 ENCOUNTER — TELEPHONE (OUTPATIENT)
Dept: OBGYN | Facility: CLINIC | Age: 67
End: 2017-05-23

## 2017-05-23 NOTE — TELEPHONE ENCOUNTER
Patient is calling to ask who the doctor is that  told her about, who was an associate of his at [facility], that he could refer her to. She would like a call back please. Thank you

## 2017-05-23 NOTE — TELEPHONE ENCOUNTER
Reviewed last office visit notes from Dr. Watson on 04-10-17. Do not see referral information.  Noted a referral from Dr. Watson on 11-28-16:  Your provider has referred you to:  [facility]: Gynecologic Cancer Clinic [facility] [phone]   [email or web address]    Will route to Dr. Watson for review & orders. Marina Stratton RN, BAN

## 2017-05-24 NOTE — TELEPHONE ENCOUNTER
I'm sorry, but I don't remember that conversation.  Is she looking for Gynecologist at [place]?  Then she is looking for Dr. Alvares.  If not, then which type of provider is she wanting to see.  Rodger Watson MD FACOG

## 2017-05-26 NOTE — TELEPHONE ENCOUNTER
Left message for patient to return call to [facility]. When patient returns call please contact someone from department. According to message below patient will need to schedule appointment with Dr. Alvares.    Mee Manning CMA

## 2017-05-30 DIAGNOSIS — Z13.6 CARDIOVASCULAR SCREENING; LDL GOAL LESS THAN 100: ICD-10-CM

## 2017-05-31 RX ORDER — PRAVASTATIN SODIUM 40 MG
TABLET ORAL
Qty: 90 TABLET | Refills: 0 | Status: SHIPPED | OUTPATIENT
Start: 2017-05-31 | End: 2017-07-10

## 2017-05-31 NOTE — TELEPHONE ENCOUNTER
pravastatin (PRAVACHOL) 40 MG      Last Written Prescription Date: 07/28/16  Last Fill Quantity: 90, # refills: 3  Last Office Visit with [facility], P or [facility] prescribing provider: 04/18/17       Lab Results   Component Value Date    CHOL 129 06/14/2016     Lab Results   Component Value Date    HDL 69 06/14/2016     Lab Results   Component Value Date    LDL 42 06/14/2016     Lab Results   Component Value Date    TRIG 90 06/14/2016     Lab Results   Component Value Date    CHOLHDLRATIO 2.2 07/09/2015

## 2017-05-31 NOTE — TELEPHONE ENCOUNTER
Prescription approved per [facility] Refill Protocol.  Nadia Calderon, RN - BC

## 2017-06-09 DIAGNOSIS — Z53.9 DIAGNOSIS NOT YET DEFINED: Primary | ICD-10-CM

## 2017-06-09 PROCEDURE — G0179 MD RECERTIFICATION HHA PT: HCPCS | Performed by: INTERNAL MEDICINE

## 2017-06-10 ENCOUNTER — TELEPHONE (OUTPATIENT)
Dept: FAMILY MEDICINE | Facility: CLINIC | Age: 67
End: 2017-06-10

## 2017-06-10 NOTE — TELEPHONE ENCOUNTER
Reason for call:  Symptom   Symptom or request:possible bladder infection     Duration (how long have symptoms been present): few days  Have you been treated for this before? No    Additional comments: requesting medication for bladder infection-refused appt,wanted a msg sent     Phone number to reach patient:  Home number on file [phone] (home)    Best Time:  Any     Can we leave a detailed message on this number?  YES

## 2017-06-12 NOTE — TELEPHONE ENCOUNTER
Per patient, she was having urinary frequency, but no other symptoms.  Symptoms have resolved and she does not have any issues/concerns today.  Advised that she schedule an appointment for further eval and testing if her urinary symptoms return.  Patient verbalized understanding.  Harshal Hayward RN

## 2017-06-21 DIAGNOSIS — B37.2 CANDIDAL INTERTRIGO: ICD-10-CM

## 2017-06-22 RX ORDER — NYSTATIN 100000 [USP'U]/G
POWDER TOPICAL
Qty: 15 G | Refills: 1 | Status: SHIPPED | OUTPATIENT
Start: 2017-06-22 | End: 2017-09-26

## 2017-06-22 NOTE — TELEPHONE ENCOUNTER
NYSTOP 185235 UNIT/GM POWD powder      Last Written Prescription Date: 4/20/2017  Last Fill Quantity: 15g,  # refills: 0   Last Office Visit with [facility], [facility] or [facility] prescribing provider: 4/18/2017

## 2017-06-26 NOTE — TELEPHONE ENCOUNTER
insulin glargine U-300 (TOUJEO SOLOSTAR) 300 UNIT/ML injection         Last Written Prescription Date: 4/18/2017  Last Fill Quantity: 4.5 mL, # refills: 2  Last Office Visit with G, P or [facility] prescribing provider:  4/18/2017        BP Readings from Last 3 Encounters:   04/18/17 124/60   04/10/17 127/74   03/03/17 130/82     Lab Results   Component Value Date    MICROL 8 06/21/2016     Lab Results   Component Value Date    UMALCR 29.37 06/21/2016     Creatinine   Date Value Ref Range Status   04/18/2017 1.27 (H) 0.52 - 1.04 mg/dL Final   ]  GFR Estimate   Date Value Ref Range Status   04/18/2017 42 (L) >60 mL/min/1.7m2 Final     Comment:     Non  GFR Calc   03/03/2017 43 (L) >60 mL/min/1.7m2 Final     Comment:     Non  GFR Calc   11/11/2016 41 (L) >60 mL/min/1.7m2 Final     Comment:     Non  GFR Calc     GFR Estimate If Black   Date Value Ref Range Status   04/18/2017 51 (L) >60 mL/min/1.7m2 Final     Comment:      GFR Calc   03/03/2017 52 (L) >60 mL/min/1.7m2 Final     Comment:      GFR Calc   11/11/2016 49 (L) >60 mL/min/1.7m2 Final     Comment:      GFR Calc     Lab Results   Component Value Date    CHOL 129 06/14/2016     Lab Results   Component Value Date    HDL 69 06/14/2016     Lab Results   Component Value Date    LDL 42 06/14/2016     Lab Results   Component Value Date    TRIG 90 06/14/2016     Lab Results   Component Value Date    CHOLHDLRATIO 2.2 07/09/2015     Lab Results   Component Value Date    AST 31 03/15/2017     Lab Results   Component Value Date    ALT 27 03/15/2017     Lab Results   Component Value Date    A1C 7.1 04/18/2017    A1C 9.4 11/11/2016    A1C 10.2 06/14/2016    A1C 7.9 02/11/2016    A1C 7.7 11/10/2015     Potassium   Date Value Ref Range Status   04/18/2017 3.6 3.4 - 5.3 mmol/L Final

## 2017-06-26 NOTE — TELEPHONE ENCOUNTER
Prescription approved per [facility] Refill Protocol.  Marlin Schreiber RN

## 2017-06-30 ENCOUNTER — TELEPHONE (OUTPATIENT)
Dept: FAMILY MEDICINE | Facility: CLINIC | Age: 67
End: 2017-06-30

## 2017-07-03 NOTE — TELEPHONE ENCOUNTER
Due for follow up now-- has nothing scheduled  Call to schedule with Dr. Roberson and then can fill 90 d supply  Stacey Ernst MD

## 2017-07-03 NOTE — TELEPHONE ENCOUNTER
Routing refill request to provider for review/approval because:  Labs out of range:  Cr      Nadia Calderon RN - BC

## 2017-07-06 RX ORDER — VALSARTAN AND HYDROCHLOROTHIAZIDE 160; 25 MG/1; MG/1
TABLET ORAL
Qty: 90 TABLET | Refills: 0 | Status: SHIPPED | OUTPATIENT
Start: 2017-07-06 | End: 2017-07-10

## 2017-07-06 NOTE — TELEPHONE ENCOUNTER
Patient is scheduled to see Carole Decker CNP on Monday, July 10th at 1:40 p.m.  Routing to RN to refill medication. Camila Rudolph,

## 2017-07-07 NOTE — PROGRESS NOTES
SUBJECTIVE:                                                    Marina Valdez is a 66 year old female who presents to clinic today for the following health issues:    Diabetes Follow-up    Patient is checking blood sugars: once daily.  Results are as follows:         am - 123-138    Diabetic concerns: None     Symptoms of hypoglycemia (low blood sugar): none     Paresthesias (numbness or burning in feet) or sores: No     Date of last diabetic eye exam: 1 week ago    Hypertension Follow-up      Outpatient blood pressures checked once a week from nurse: 112/70    Low Salt Diet: no added salt      Amount of exercise or physical activity: 4 days/week for an average of 15-30 minutes    Problems taking medications regularly: No    Medication side effects: none    Diet: diabetic    Problem list and histories reviewed & adjusted, as indicated.  Additional history: as documented    Patient Active Problem List   Diagnosis     Hypertension goal BP (blood pressure) < 140/90     Recurrent genital HSV (herpes simplex virus) infection     Intertrigo     Ovarian Mass s/p excision in 2008     Sebaceous cyst     Microalbuminuria     CARDIOVASCULAR SCREENING; LDL GOAL LESS THAN 100     Chest pain at rest x1 episode- resolved at rest 3 hours, assoc with food     Obstructive sleep apnea (on cpap)     24 hour contact given to patient      Hydradenitis     CKD (chronic kidney disease) stage 3, GFR 30-59 ml/min     Health Care Home-Not Active     Compulsive skin picking     Need for prophylactic vaccination and inoculation against influenza     High risk OTC medication or supplement use     Rotator cuff tear     Morbid obesity due to excess calories (H)     Chronic bilateral low back pain without sciatica     Primary osteoarthritis of both hips     Schizophrenia, unspecified type (H)     Uncontrolled type 2 diabetes mellitus with stage 3 chronic kidney disease, with long-term current use of insulin (H)     Endometrial cancer  determined by uterine biopsy (H)     Impetigo     Past Surgical History:   Procedure Laterality Date     cataract      bilateral     CYSTOSCOPY N/A 12/29/2016    Procedure: CYSTOSCOPY;  Surgeon: Franca Rousseau MD;  Location: [facility] OR     DAVINCI HYSTERECTOMY TOTAL, BILATERAL SALPINGO-OOPHORECTOMY, COMBINED N/A 12/29/2016    Procedure: COMBINED DAVINCI HYSTERECTOMY TOTAL, SALPINGO-OOPHORECTOMY;  Surgeon: Franca Rousseau MD;  Location: [facility] OR     LAPAROSCOPY      for endometriosis     OOPHORECTOMY      right     WRIST SURGERY      right       Social History   Substance Use Topics     Smoking status: Former Smoker     Packs/day: 1.00     Years: 0.00     Types: Cigarettes     Quit date: 1/1/1990     Smokeless tobacco: Never Used      Comment: quit 1990     Alcohol use No     Family History   Problem Relation Age of Onset     HEART DISEASE Mother      DIABETES Mother      Hypertension Mother      Psychotic Disorder Mother      schitzophrenia     Hypertension Father      Blood Disease Father      high iron level     HEART DISEASE Maternal Grandmother      CEREBROVASCULAR DISEASE Maternal Grandmother      HEART DISEASE Maternal Grandfather      Breast Cancer Paternal Grandmother      Breast Cancer Sister      HEART DISEASE Brother      Ovarian Cancer Paternal Aunt      Kidney Cancer Daughter      Uterine Cancer Daughter      Uterine Cancer Sister          Current Outpatient Prescriptions   Medication Sig Dispense Refill     blood glucose monitoring (ONE TOUCH DELICA) lancets Use to test blood sugars 3 times daily or as directed. Qty of 1 box= 100 lancets 300 each 3     valsartan-hydrochlorothiazide (DIOVAN-HCT) 160-25 MG per tablet Take 1 tablet by mouth daily 90 tablet 1     pravastatin (PRAVACHOL) 40 MG tablet Take 1 tablet (40 mg) by mouth daily 90 tablet 3     blood glucose monitoring (NO BRAND SPECIFIED) test strip Use to test blood sugars 3 times daily or as directed 300 strip 3     insulin glargine  U-300 (TOUJEO SOLOSTAR) 300 UNIT/ML injection INJECT 96 UNITS UNDER THE SKIN AT BEDTIME. DOSE TO BE TITRATED UP PER DIABETIC ED. 1.5 mL 3     NYSTOP 419371 UNIT/GM POWD powder APPLY TO GROIN AND UNDER BREAST TWICE DAILY UNTIL REDNESS HAS RESOLVED. 15 g 1     B-D U/F 31G X 8 MM insulin pen needle USE FOUR TIMES DAILY AS DIRECTED WITH LANTUS AND PREMEAL INSULIN 200 each 3     acetaminophen (TYLENOL) 500 MG tablet Take 500 mg by mouth       traMADol (ULTRAM) 50 MG tablet 50 mg  1     LORazepam (ATIVAN) 0.5 MG tablet 0.5 mg Reported on 4/10/2017  0     valACYclovir (VALTREX) 500 MG tablet TAKE ONE TABLET BY MOUTH ONE TIME DAILY 90 tablet 1     diphenhydrAMINE (BENADRYL) 25 MG tablet Take 25 mg by mouth At Bedtime And 25 mg nightly as needed       cholecalciferol 1000 UNITS TABS Take 1 tablet by mouth daily       ziprasidone (GEODON) 80 MG capsule Take 160 mg by mouth every morning And 80 mg daily at 10 AM, and 80 mg nightly as needed       senna-docusate (SENOKOT-S;PERICOLACE) 8.6-50 MG per tablet Take 2 tablets by mouth 2 times daily as needed for constipation       insulin lispro (HUMALOG KWIKPEN) 100 UNIT/ML injection Inject 15 Units Subcutaneous 3 times daily (before meals)       ONE TOUCH VERIO IQ test strip TEST THREE TIMES DAILY OR AS DIRECTED 100 strip 9     order for DME Equipment being ordered: corset to prevent compulsive skin picking of abdomen 1 Device 0     order for DME Equipment being ordered: Lift Chair 1 each 0     aspirin 81 MG tablet Take 1 tablet (81 mg) by mouth daily 100 tablet 3     ORDER FOR DME Equipment being ordered: CPAP machine set at 15 pressure, heated humidifier, supplies: mask and tubing ( supplies) 1 Device 1     ORDER FOR DME Equipment being ordered: CPAP and associated supplies and tubing 1 Device 0     PAROXETINE HCL 40 MG OR TABS Take 80 mg by mouth daily. 80mg=2 tablets.        [DISCONTINUED] valsartan-hydrochlorothiazide (DIOVAN-HCT) 160-25 MG per tablet TAKE 1 TABLET BY MOUTH  DAILY 90 tablet 0     [DISCONTINUED] pravastatin (PRAVACHOL) 40 MG tablet TAKE ONE TABLET BY MOUTH ONE TIME DAILY 90 tablet 0     Allergies   Allergen Reactions     Other [Seasonal Allergies]      Hayfever     BP Readings from Last 3 Encounters:   07/10/17 124/76   04/18/17 124/60   04/10/17 127/74    Wt Readings from Last 3 Encounters:   07/10/17 (!) 321 lb 8 oz (145.8 kg)   04/18/17 (!) 312 lb (141.5 kg)   04/10/17 (!) 320 lb 6.4 oz (145.3 kg)                  Labs reviewed in EPIC    Reviewed and updated as needed this visit by clinical staff       Reviewed and updated as needed this visit by Provider         ROS:  Constitutional, HEENT, cardiovascular, pulmonary, gi and gu systems are negative, except as otherwise noted.    OBJECTIVE:     /76  Pulse 125  Temp 97.4  F (36.3  C) (Oral)  Wt (!) 321 lb 8 oz (145.8 kg)  LMP 03/24/2010  SpO2 92%  BMI 54.89 kg/m2  Body mass index is 54.89 kg/(m^2).  GENERAL: healthy, alert and no distress  RESP: lungs clear to auscultation - no rales, rhonchi or wheezes  CV: regular rate and rhythm, normal S1 S2, no S3 or S4, no murmur, click or rub, no peripheral edema and peripheral pulses strong  MS: no gross musculoskeletal defects noted, no edema    Diagnostic Test Results:  pending    ASSESSMENT/PLAN:     1. Uncontrolled type 2 diabetes mellitus with stage 3 chronic kidney disease, with long-term current use of insulin (H)  Appears well controlled.  Continue current medications pending HgbA1C results.  - LDL cholesterol direct  - Hemoglobin A1c  - blood glucose monitoring (ONE TOUCH DELICA) lancets; Use to test blood sugars 3 times daily or as directed. Qty of 1 box= 100 lancets  Dispense: 300 each; Refill: 3  - valsartan-hydrochlorothiazide (DIOVAN-HCT) 160-25 MG per tablet; Take 1 tablet by mouth daily  Dispense: 90 tablet; Refill: 1  - blood glucose monitoring (NO BRAND SPECIFIED) test strip; Use to test blood sugars 3 times daily or as directed  Dispense: 300  strip; Refill: 3    2. Hypertension goal BP (blood pressure) < 140/90  Stable.  Continue current treatment plan and medications.      3. Hyperlipidemia LDL goal <100  Stable.  Continue current treatment plan and medications.    - pravastatin (PRAVACHOL) 40 MG tablet; Take 1 tablet (40 mg) by mouth daily  Dispense: 90 tablet; Refill: 3    FUTURE APPOINTMENTS:       - Follow-up visit in 6 months.    DENNY [facility]

## 2017-07-08 DIAGNOSIS — G89.29 CHRONIC BILATERAL LOW BACK PAIN WITHOUT SCIATICA: Primary | ICD-10-CM

## 2017-07-08 DIAGNOSIS — M54.50 CHRONIC BILATERAL LOW BACK PAIN WITHOUT SCIATICA: Primary | ICD-10-CM

## 2017-07-10 ENCOUNTER — OFFICE VISIT (OUTPATIENT)
Dept: FAMILY MEDICINE | Facility: CLINIC | Age: 67
End: 2017-07-10
Payer: MEDICARE

## 2017-07-10 VITALS
DIASTOLIC BLOOD PRESSURE: 76 MMHG | WEIGHT: 293 LBS | HEART RATE: 125 BPM | SYSTOLIC BLOOD PRESSURE: 124 MMHG | OXYGEN SATURATION: 92 % | TEMPERATURE: 97.4 F | BODY MASS INDEX: 54.89 KG/M2

## 2017-07-10 DIAGNOSIS — I10 HYPERTENSION GOAL BP (BLOOD PRESSURE) < 140/90: ICD-10-CM

## 2017-07-10 DIAGNOSIS — E78.5 HYPERLIPIDEMIA LDL GOAL <100: ICD-10-CM

## 2017-07-10 LAB — HBA1C MFR BLD: 8.4 % (ref 4.3–6)

## 2017-07-10 PROCEDURE — 99214 OFFICE O/P EST MOD 30 MIN: CPT | Performed by: NURSE PRACTITIONER

## 2017-07-10 PROCEDURE — 83721 ASSAY OF BLOOD LIPOPROTEIN: CPT | Performed by: NURSE PRACTITIONER

## 2017-07-10 PROCEDURE — 83036 HEMOGLOBIN GLYCOSYLATED A1C: CPT | Performed by: NURSE PRACTITIONER

## 2017-07-10 PROCEDURE — 36415 COLL VENOUS BLD VENIPUNCTURE: CPT | Performed by: NURSE PRACTITIONER

## 2017-07-10 RX ORDER — PRAVASTATIN SODIUM 40 MG
40 TABLET ORAL DAILY
Qty: 90 TABLET | Refills: 3 | Status: SHIPPED | OUTPATIENT
Start: 2017-07-10 | End: 2018-08-17

## 2017-07-10 RX ORDER — VALSARTAN AND HYDROCHLOROTHIAZIDE 160; 25 MG/1; MG/1
1 TABLET ORAL DAILY
Qty: 90 TABLET | Refills: 1 | Status: SHIPPED | OUTPATIENT
Start: 2017-07-10 | End: 2018-01-25

## 2017-07-10 NOTE — PATIENT INSTRUCTIONS
[facility]    If you have any questions regarding to your visit please contact your care team:     Team Pink:   Clinic Hours Telephone Number   Internal Medicine:  Dr. Jeanie Decker NP       7am-7pm  Monday - Thursday   7am-5pm  Fridays  [phone]  (Appointment scheduling available 24/7)    Questions about your visit?  Team Line  [phone]   Urgent Care - [place] and [facility] - 11am-9pm Monday-Friday Saturday-Sunday- 9am-5pm   [place] - 5pm-9pm Monday-Friday Saturday-Sunday- 9am-5pm  [phone] - [place]   [phone] - [place]       What options do I have for visits at the clinic other than the traditional office visit?  To expand how we care for you, many of our providers are utilizing electronic visits (e-visits) and telephone visits, when medically appropriate, for interactions with their patients rather than a visit in the clinic.   We also offer nurse visits for many medical concerns. Just like any other service, we will bill your insurance company for this type of visit based on time spent on the phone with your provider. Not all insurance companies cover these visits. Please check with your medical insurance if this type of visit is covered. You will be responsible for any charges that are not paid by your insurance.      E-visits via [employer]:  generally incur a $35.00 fee.  Telephone visits:  Time spent on the phone: *charged based on time that is spent on the phone in increments of 10 minutes. Estimated cost:   5-10 mins $30.00   11-20 mins. $59.00   21-30 mins. $85.00   Use [employer] (secure email communication and access to your chart) to send your primary care provider a message or make an appointment. Ask someone on your Team how to sign up for [employer].    For a Price Quote for your services, please call our Consumer Price Line at [phone].    As always, Thank you for trusting us with your health care  needs!    Discharged by Suzy POWELL CMA ([facility])

## 2017-07-10 NOTE — LETTER
[facility]  [address] [place] [address]    July 11, 2017    Marina Nguyenjeanniekatherine  [address]    [place] [address]          Dear Harmeet Gray.     If you have any questions please feel free to contact [phone] or myself via [employer].     Enclosed is a copy of your results.     Results for orders placed or performed in visit on 07/10/17   LDL cholesterol direct   Result Value Ref Range    LDL Cholesterol Direct 49 <100 mg/dL   Hemoglobin A1c   Result Value Ref Range    Hemoglobin A1C 8.4 (H) 4.3 - 6.0 %       If you have any questions or concerns, please call myself or my nurse at [phone].      Sincerely,        Carole Decker CNP/ABEL

## 2017-07-10 NOTE — TELEPHONE ENCOUNTER
Controlled Substance Refill Request for traMADol (ULTRAM) 50 MG tablet  Problem List Complete:  No     PROVIDER TO CONSIDER COMPLETION OF PROBLEM LIST AND OVERVIEW/CONTROLLED SUBSTANCE AGREEMENT    Last Written Prescription Date:  11/13/2016  Last Fill Quantity: ?,   # refills: 1    Last Office Visit with [facility] primary care provider: 7/10/2017    Future Office visit:   Next 5 appointments (look out 90 days)     Jul 10, 2017  1:40 PM CDT   [facility]    [address] [address]   [phone]            Oct 03, 2017  1:00 PM CDT   Office Visit with Dyllan Alvares MD   [facility] ([facility]    [address] [address]   [phone]                  Controlled substance agreement on file: No.     Processing:  NA   checked in past 6 months?  No, route to RN

## 2017-07-10 NOTE — MR AVS SNAPSHOT
After Visit Summary   7/10/2017    Marina Valdez    MRN: [id number]           Patient Information     Date Of Birth          1950        Visit Information        Provider Department      7/10/2017 1:40 PM Carole Decker APRN [facility]        Today's Diagnoses     Uncontrolled type 2 diabetes mellitus with stage 3 chronic kidney disease, with long-term current use of insulin (H)    -  1    Hypertension goal BP (blood pressure) < 140/90        Hyperlipidemia LDL goal <100          Care Instructions    [facility]    If you have any questions regarding to your visit please contact your care team:     Team Pink:   Clinic Hours Telephone Number   Internal Medicine:  Dr. Jeanie Decker, NP       7am-7pm  Monday - Thursday   7am-5pm  Fridays  [phone]  (Appointment scheduling available 24/7)    Questions about your visit?  Team Line  [phone]   Urgent Care - [place] and [place] - 11am-9pm Monday-Friday Saturday-Sunday- 9am-5pm   [place] - 5pm-9pm Monday-Friday Saturday-Sunday- 9am-5pm  [phone] - [place]   [phone] - [place]       What options do I have for visits at the clinic other than the traditional office visit?  To expand how we care for you, many of our providers are utilizing electronic visits (e-visits) and telephone visits, when medically appropriate, for interactions with their patients rather than a visit in the clinic.   We also offer nurse visits for many medical concerns. Just like any other service, we will bill your insurance company for this type of visit based on time spent on the phone with your provider. Not all insurance companies cover these visits. Please check with your medical insurance if this type of visit is covered. You will be responsible for any charges that are not paid by your insurance.      E-visits via [employer]:  generally incur a $35.00  fee.  Telephone visits:  Time spent on the phone: *charged based on time that is spent on the phone in increments of 10 minutes. Estimated cost:   5-10 mins $30.00   11-20 mins. $59.00   21-30 mins. $85.00   Use [employer] (secure email communication and access to your chart) to send your primary care provider a message or make an appointment. Ask someone on your Team how to sign up for [employer].    For a Price Quote for your services, please call our [employer] Line at [phone].    As always, Thank you for trusting us with your health care needs!    Discharged by Suzy POWELL CMA ([facility])            Follow-ups after your visit        Your next 10 appointments already scheduled     Oct 03, 2017  1:00 PM CDT   Office Visit with Dyllan Alvares MD   [facility] ([facility]    [address] [place] [id number] [phone]           Bring a current list of meds and any records pertaining to this visit.  For Physicals, please bring immunization records and any forms needing to be filled out.  Please arrive 10 minutes early to complete paperwork.              Who to contact     If you have questions or need follow up information about today's clinic visit or your schedule please contact [facility] directly at [phone].  Normal or non-critical lab and imaging results will be communicated to you by [employer], letter or phone within 4 business days after the clinic has received the results. If you do not hear from us within 7 days, please contact the clinic through [employer] or phone. If you have a critical or abnormal lab result, we will notify you by phone as soon as possible.  Submit refill requests through [employer]ighborhood[employer] or call your pharmacy and they will forward the refill request to us. Please allow 3 business days for your refill to be completed.          Additional Information About Your Visit        Neighborhoods Information     Neighborhoods lets you send messages to your  "doctor, view your test results, renew your prescriptions, schedule appointments and more. To sign up, go to [email or web address] . Click on \"Log in\" on the left side of the screen, which will take you to the Welcome page. Then click on \"Sign up Now\" on the right side of the page.     You will be asked to enter the access code listed below, as well as some personal information. Please follow the directions to create your username and password.     Your access code is: [email or web address]  Expires: 10/8/2017  2:16 PM     Your access code will  in 90 days. If you need help or a new code, please call your [place] clinic or [phone].        Care EveryWhere ID     This is your Care EveryWhere ID. This could be used by other organizations to access your [place] medical records  [id number]        Your Vitals Were     Pulse Temperature Last Period Pulse Oximetry BMI (Body Mass Index)       125 97.4  F (36.3  C) (Oral) 2010 92% 54.89 kg/m2        Blood Pressure from Last 3 Encounters:   07/10/17 124/76   17 124/60   04/10/17 127/74    Weight from Last 3 Encounters:   07/10/17 (!) 321 lb 8 oz (145.8 kg)   17 (!) 312 lb (141.5 kg)   04/10/17 (!) 320 lb 6.4 oz (145.3 kg)              We Performed the Following     Hemoglobin A1c     LDL cholesterol direct          Today's Medication Changes          These changes are accurate as of: 7/10/17  2:16 PM.  If you have any questions, ask your nurse or doctor.               These medicines have changed or have updated prescriptions.        Dose/Directions    * ONE TOUCH VERIO IQ test strip   This may have changed:  Another medication with the same name was added. Make sure you understand how and when to take each.   Used for:  Uncontrolled type 2 diabetes mellitus with stage 3 chronic kidney disease, with long-term current use of insulin (H)   Generic drug:  blood glucose monitoring   Changed by:  Mateus Roberson MD        TEST THREE TIMES DAILY OR AS " DIRECTED   Quantity:  100 strip   Refills:  9       * blood glucose monitoring test strip   Commonly known as:  no brand specified   This may have changed:  You were already taking a medication with the same name, and this prescription was added. Make sure you understand how and when to take each.   Used for:  Uncontrolled type 2 diabetes mellitus with stage 3 chronic kidney disease, with long-term current use of insulin (H)   Changed by:  Carole Decker APRN CNP        Use to test blood sugars 3 times daily or as directed   Quantity:  300 strip   Refills:  3       pravastatin 40 MG tablet   Commonly known as:  PRAVACHOL   This may have changed:  See the new instructions.   Used for:  Hyperlipidemia LDL goal <100   Changed by:  Carole Decker APRN CNP        Dose:  40 mg   Take 1 tablet (40 mg) by mouth daily   Quantity:  90 tablet   Refills:  3       valsartan-hydrochlorothiazide 160-25 MG per tablet   Commonly known as:  DIOVAN-HCT   This may have changed:  See the new instructions.   Used for:  Uncontrolled type 2 diabetes mellitus with stage 3 chronic kidney disease, with long-term current use of insulin (H)   Changed by:  Carole Decker APRN CNP        Dose:  1 tablet   Take 1 tablet by mouth daily   Quantity:  90 tablet   Refills:  1       * Notice:  This list has 2 medication(s) that are the same as other medications prescribed for you. Read the directions carefully, and ask your doctor or other care provider to review them with you.         Where to get your medicines      These medications were sent to [place] [address] [facility] - [place], [place] - [address] [facility]  [address] [facility] [address]     Phone:  [phone]     blood glucose monitoring lancets    blood glucose monitoring test strip    pravastatin 40 MG tablet    valsartan-hydrochlorothiazide 160-25 MG per tablet                Primary Care Provider Office Phone # Fax #    Mateus Roberson MD  [phone]       [address] [address]        Equal Access to Services     MILENA GUILLEN : German rios vipul Bella, wamilanda luqrobin, qanoeta kaalmada vida, sanjana marcano laPanfiloadrien parra. So [facility] [phone].    ATENCIÓN: Si habla español, tiene a garza disposición servicios gratuitos de asistencia lingüística. Llame al [phone].    We comply with applicable federal civil rights laws and [place] laws. We do not discriminate on the basis of race, color, national origin, age, disability sex, sexual orientation or gender identity.            Thank you!     Thank you for choosing [facility]  for your care. Our goal is always to provide you with excellent care. Hearing back from our patients is one way we can continue to improve our services. Please take a few minutes to complete the written survey that you may receive in the mail after your visit with us. Thank you!             Your Updated Medication List - Protect others around you: Learn how to safely use, store and throw away your medicines at www.disposemymeds.org.          This list is accurate as of: 7/10/17  2:16 PM.  Always use your most recent med list.                   Brand Name Dispense Instructions for use Diagnosis    acetaminophen 500 MG tablet    TYLENOL     Take 500 mg by mouth        aspirin 81 MG tablet     100 tablet    Take 1 tablet (81 mg) by mouth daily    Type 2 diabetes, HbA1C goal < 8% (H)       B-D U/F 31G X 8 MM   Generic drug:  insulin pen needle     200 each    USE FOUR TIMES DAILY AS DIRECTED WITH LANTUS AND PREMEAL INSULIN    Uncontrolled type 2 diabetes mellitus with stage 3 chronic kidney disease, with long-term current use of insulin (H)       BENADRYL 25 MG tablet   Generic drug:  diphenhydrAMINE      Take 25 mg by mouth At Bedtime And 25 mg nightly as needed        blood glucose monitoring lancets     300 each    Use to test blood sugars  3 times daily or as directed. Qty of 1 box= 100 lancets    Uncontrolled type 2 diabetes mellitus with stage 3 chronic kidney disease, with long-term current use of insulin (H)       cholecalciferol 1000 UNITS Tabs      Take 1 tablet by mouth daily        GEODON 80 MG capsule   Generic drug:  ziprasidone      Take 160 mg by mouth every morning And 80 mg daily at 10 AM, and 80 mg nightly as needed        HumaLOG KWIKpen 100 UNIT/ML injection   Generic drug:  insulin lispro      Inject 15 Units Subcutaneous 3 times daily (before meals)        insulin glargine U-300 300 UNIT/ML injection    TOUJEO SOLOSTAR    1.5 mL    INJECT 96 UNITS UNDER THE SKIN AT BEDTIME. DOSE TO BE TITRATED UP PER DIABETIC ED.    Uncontrolled type 2 diabetes mellitus with stage 3 chronic kidney disease, with long-term current use of insulin (H)       LORazepam 0.5 MG tablet    ATIVAN     0.5 mg Reported on 4/10/2017        NYSTOP 651302 UNIT/GM Powd   Generic drug:  nystatin     15 g    APPLY TO GROIN AND UNDER BREAST TWICE DAILY UNTIL REDNESS HAS RESOLVED.    Candidal intertrigo       * ONE TOUCH VERIO IQ test strip   Generic drug:  blood glucose monitoring     100 strip    TEST THREE TIMES DAILY OR AS DIRECTED    Uncontrolled type 2 diabetes mellitus with stage 3 chronic kidney disease, with long-term current use of insulin (H)       * blood glucose monitoring test strip    no brand specified    300 strip    Use to test blood sugars 3 times daily or as directed    Uncontrolled type 2 diabetes mellitus with stage 3 chronic kidney disease, with long-term current use of insulin (H)       * order for DME     1 Device    Equipment being ordered: CPAP and associated supplies and tubing    Obstructive sleep apnea       * order for DME     1 Device    Equipment being ordered: CPAP machine set at 15 pressure, heated humidifier, supplies: mask and tubing ( supplies)    Obstructive sleep apnea       * order for DME     1 each    Equipment being ordered:  Lift Chair    Morbid obesity due to excess calories (H), Chronic bilateral low back pain without sciatica       * order for DME     1 Device    Equipment being ordered: corset to prevent compulsive skin picking of abdomen    Compulsive skin picking       PARoxetine 40 MG tablet    PAXIL     Take 80 mg by mouth daily. 80mg=2 tablets.        pravastatin 40 MG tablet    PRAVACHOL    90 tablet    Take 1 tablet (40 mg) by mouth daily    Hyperlipidemia LDL goal <100       senna-docusate 8.6-50 MG per tablet    SENOKOT-S;PERICOLACE     Take 2 tablets by mouth 2 times daily as needed for constipation        traMADol 50 MG tablet    ULTRAM     50 mg        valACYclovir 500 MG tablet    VALTREX    90 tablet    TAKE ONE TABLET BY MOUTH ONE TIME DAILY    Recurrent genital HSV (herpes simplex virus) infection       valsartan-hydrochlorothiazide 160-25 MG per tablet    DIOVAN-HCT    90 tablet    Take 1 tablet by mouth daily    Uncontrolled type 2 diabetes mellitus with stage 3 chronic kidney disease, with long-term current use of insulin (H)       * Notice:  This list has 6 medication(s) that are the same as other medications prescribed for you. Read the directions carefully, and ask your doctor or other care provider to review them with you.

## 2017-07-10 NOTE — PROGRESS NOTES
Please call patient-    Her HgbA1C is elevated to 8.4.  I would like her to increase her Toujeo to 100 units daily.  Please have her follow-up in 1 month and bring her blood sugar log or meter with her (indication Diabetes Mellitus Type 2).    Thanks,  Carole Decker, CNP

## 2017-07-10 NOTE — NURSING NOTE
"Chief Complaint   Patient presents with     Hypertension     BP check/med check     Diabetes     requesting A1C but has not been 3 months since last check.       Initial /76  Pulse 125  Temp 97.4  F (36.3  C) (Oral)  Wt (!) 321 lb 8 oz (145.8 kg)  LMP 03/24/2010  SpO2 92%  BMI 54.89 kg/m2 Estimated body mass index is 54.89 kg/(m^2) as calculated from the following:    Height as of 4/18/17: 5' 4.17\" (1.63 m).    Weight as of this encounter: 321 lb 8 oz (145.8 kg).  Medication Reconciliation: complete     Isamar Ayers MA    "

## 2017-07-11 LAB — LDLC SERPL DIRECT ASSAY-MCNC: 49 MG/DL

## 2017-07-11 RX ORDER — TRAMADOL HYDROCHLORIDE 50 MG/1
50 TABLET ORAL 2 TIMES DAILY PRN
Qty: 60 TABLET | Refills: 0 | Status: SHIPPED | OUTPATIENT
Start: 2017-07-11 | End: 2017-09-26

## 2017-07-11 NOTE — PROGRESS NOTES
Dear Marina,    Your recent test results are attached.      Good cholesterol.    If you have any questions please feel free to contact [phone] or myself via [employer].    Sincerely,  Carole Decker, CNP

## 2017-07-11 NOTE — TELEPHONE ENCOUNTER
1 month supply. Please help see to it that appointment is generated , please make appointment within 30 days , needs among other things, chronic pain care package completion      Mateus Roberson MD

## 2017-07-12 NOTE — TELEPHONE ENCOUNTER
Ultram prescription faxed to [facility] pharmacy at [phone].  Patient does have appointment scheduled with Carole Decker CNP on Tuesday, August 15th at 1:40 p.m.  Camila Rudolph,

## 2017-07-13 ENCOUNTER — TELEPHONE (OUTPATIENT)
Dept: FAMILY MEDICINE | Facility: CLINIC | Age: 67
End: 2017-07-13

## 2017-07-13 NOTE — TELEPHONE ENCOUNTER
It's not safe to simply pump up narcotic pain medication. She needs referral to chronic pain clinic , [facility]     Mateus Roberson MD

## 2017-07-13 NOTE — TELEPHONE ENCOUNTER
Patient notified of Provider's message as written.    She declined a f/u with Pain clinic.  She stated that she will just keep trying the exercises and using the Tramadol BID PRN  Advised patient to take Tramadol up to BID and make sure she takes it before her pain gets intense.  She is to call the clinic and not increase the pain meds on her own if pain is unrelieved.  Patient verbalized understanding.    Harshal Hayward RN

## 2017-07-13 NOTE — TELEPHONE ENCOUNTER
Reason for Call:  Other prescription    Detailed comments:  Patient calling. She is taking tramadol for her sciatic pain. She is doing the exercises that she is to do. Can she get something stronger? Please call and advise.     Phone Number Patient can be reached at: Home number on file [phone] (home)    Best Time:  Any     Can we leave a detailed message on this number? YES    Call taken on 7/13/2017 at 10:33 AM by Daisy Ramos

## 2017-07-18 ENCOUNTER — TELEPHONE (OUTPATIENT)
Dept: OBGYN | Facility: CLINIC | Age: 67
End: 2017-07-18

## 2017-07-18 NOTE — TELEPHONE ENCOUNTER
Reason for Call:  Other call back    Detailed comments:  Patient calling. She is having some pain. Please call to discuss if she needs to come in.     Phone Number Patient can be reached at: Home number on file [phone] (home)    Best Time:  Any     Can we leave a detailed message on this number? YES    Call taken on 7/18/2017 at 10:18 AM by Daisy Ramos

## 2017-07-18 NOTE — TELEPHONE ENCOUNTER
Patient stated she is experiencing intense sharp pains to her right buttock.  She denied pain to her right hip/leg or lower back. Her left gluteus meg is pain free.  She is taking Tramadol 1 tablet twice a day along with Tylenol every 4 hours prn.  She does daily exercises which help temporarily but hen the pain returns.  She feels her cancer is back.  Patient repeated this fact multiple times during our discussion. Patient declined the referral for the pain clinic.  She stated she has increased vaginal discharge that is dark in color and has a bad odor.  Patient will only see Dr. Alvares.  Scheduled her 7/26/2017.  If symptoms worsen she will call back sooner.  Grecia Gleason RN

## 2017-07-21 ENCOUNTER — TELEPHONE (OUTPATIENT)
Dept: NURSING | Facility: CLINIC | Age: 67
End: 2017-07-21

## 2017-07-21 ENCOUNTER — NURSE TRIAGE (OUTPATIENT)
Dept: NURSING | Facility: CLINIC | Age: 67
End: 2017-07-21

## 2017-07-21 ENCOUNTER — TELEPHONE (OUTPATIENT)
Dept: EDUCATION SERVICES | Facility: CLINIC | Age: 67
End: 2017-07-21

## 2017-07-21 NOTE — TELEPHONE ENCOUNTER
Patient notified of Provider's message as written.  Patient verbalized understanding.  She will call the scheduling line back to schedule an appointment when she is near her calendar      Harshal Hayward RN

## 2017-07-21 NOTE — TELEPHONE ENCOUNTER
Marina called. She says that she has been suffering from crippling sciatic pain in her left buttock, has hardly been able to get around at all. Last night she forgot to take her toujeo and this morning she feels much better. She is in much less pain and is able to move her hips . She would like to know if she can change medication.     Radha Baron RN     [place] Nurse Advisor

## 2017-07-21 NOTE — TELEPHONE ENCOUNTER
Marina called. She says that she has been having increased sciatic pain in right buttock to the point that it has been crippling. She forgot to take her toujeo last evening and she feels much better. She says that she has much less pain and can move her hips much better. She is asking if she can change her medication. She says that her blolod sugars have been stable. She can susi reached at [phone].    Radha Baron RN     [place] Nurse Advisor

## 2017-07-21 NOTE — TELEPHONE ENCOUNTER
Pt states she has had chronic siactic pain for a long time then yesterday she forgot her Toujeo and today she has no pain. She is thinking this is a correlation.

## 2017-07-21 NOTE — TELEPHONE ENCOUNTER
There is absolutely no logic to this, that is to say  , Toujeo (insulin glargine U300) has nothing whatsoever to do with sciatica. I am certainly sympathizing with patient and am more concerned with her program of care for the sciatica. Is this being cared for ? It sounds like she needs an appointment and to have diabetes mellitus and Low back pain / sciatica situation reviewed     Mateus Roberson MD

## 2017-07-21 NOTE — TELEPHONE ENCOUNTER
Returned patient call, she states she did not take the toujeo last night and today he sciatica is gone, she was even able to walk to the mailbox.  Blood sugars are now in the 300's.    Sciatica not likely from Toujeo,and most likely a coincidence. Recommend she not stop her insulin as the high blood sugars are not good for her with additional concerns.  Patient instructed to resume Toujeo and follow up with her PCP next week.  Meg Boyle RN,CDE

## 2017-07-26 ENCOUNTER — OFFICE VISIT (OUTPATIENT)
Dept: OBGYN | Facility: CLINIC | Age: 67
End: 2017-07-26
Payer: MEDICARE

## 2017-07-26 VITALS
DIASTOLIC BLOOD PRESSURE: 85 MMHG | WEIGHT: 293 LBS | BODY MASS INDEX: 54.8 KG/M2 | OXYGEN SATURATION: 91 % | HEART RATE: 114 BPM | SYSTOLIC BLOOD PRESSURE: 131 MMHG

## 2017-07-26 DIAGNOSIS — C54.1 ENDOMETRIAL CANCER (H): Primary | ICD-10-CM

## 2017-07-26 DIAGNOSIS — N89.8 DISCHARGE FROM THE VAGINA: ICD-10-CM

## 2017-07-26 PROCEDURE — 99213 OFFICE O/P EST LOW 20 MIN: CPT | Performed by: OBSTETRICS & GYNECOLOGY

## 2017-07-26 NOTE — MR AVS SNAPSHOT
After Visit Summary   7/26/2017    Marina Valdez    MRN: [id number]           Patient Information     Date Of Birth          1950        Visit Information        Provider Department      7/26/2017 11:00 AM Dyllan Alvares MD [facility]        Today's Diagnoses     Endometrial cancer (H)    -  1    Discharge from the vagina           Follow-ups after your visit        Follow-up notes from your care team     Return in about 6 months (around 1/26/2018).      Your next 10 appointments already scheduled     Aug 15, 2017  1:40 PM CDT   Office Visit with DENNY Tucker CNP   [facility] ([facility])    [address] [address]   [phone]           Bring a current list of meds and any records pertaining to this visit. For Physicals, please bring immunization records and any forms needing to be filled out. Please arrive 10 minutes early to complete paperwork.            Oct 03, 2017  1:00 PM CDT   Office Visit with Dyllan [facility]ares MD   [facility] ([facility])    [address] [address]   [phone]           Bring a current list of meds and any records pertaining to this visit. For Physicals, please bring immunization records and any forms needing to be filled out. Please arrive 10 minutes early to complete paperwork.            Dec 29, 2017 11:00 AM CST   Office Visit with Dyllan Alvares MD   [facility] ([facility])    [address] [address]   [phone]           Bring a current list of meds and any records pertaining to this visit. For Physicals, please bring immunization records and any forms needing to be filled out. Please arrive 10 minutes early to complete paperwork.              Who to contact     If you have questions or need follow up information about today's clinic visit or your schedule  "please contact [facility] directly at [phone].  Normal or non-critical lab and imaging results will be communicated to you by MyChart, letter or phone within 4 business days after the clinic has received the results. If you do not hear from us within 7 days, please contact the clinic through [employer] or phone. If you have a critical or abnormal lab result, we will notify you by phone as soon as possible.  Submit refill requests through [employer] or call your pharmacy and they will forward the refill request to us. Please allow 3 business days for your refill to be completed.          Additional Information About Your Visit        [employer]ar[employer]let[employer] Information     [employer] lets you send messages to your doctor, view your test results, renew your prescriptions, schedule appointments and more. To sign up, go to [email or web address] . Click on \"Log in\" on the left side of the screen, which will take you to the Welcome page. Then click on \"Sign up Now\" on the right side of the page.     You will be asked to enter the access code listed below, as well as some personal information. Please follow the directions to create your username and password.     Your access code is: [email or web address]  Expires: 10/8/2017  2:16 PM     Your access code will  in 90 days. If you need help or a new code, please call your [place] clinic or [phone].        Care EveryWhere ID     This is your Care EveryWhere ID. This could be used by other organizations to access your [place] medical records  [id number]        Your Vitals Were     Pulse Last Period Pulse Oximetry BMI (Body Mass Index)          114 2010 91% 54.8 kg/m2         Blood Pressure from Last 3 Encounters:   17 131/85   07/10/17 124/76   17 124/60    Weight from Last 3 Encounters:   17 (!) 321 lb (145.6 kg)   07/10/17 (!) 321 lb 8 oz (145.8 kg)   17 (!) 312 lb (141.5 kg)              Today, you had the following     No orders found for " display       Primary Care Provider Office Phone # Fax #    Mateus Roberson [phone]       [address] [address]        Equal Access to Services     MILENA GUILLEN : Hadii aad ku haddodieo Soomaali, waaxda luqadaha, qaybta kaalmada adeegyada, waxniko bridgesn tianna marcano laPanfiloadrien parra. So [facility] [phone].    ATENCIÓN: Si habla español, tiene a garza disposición servicios gratuitos de asistencia lingüística. Llame al [phone].    We comply with applicable federal civil rights laws and [place] laws. We do not discriminate on the basis of race, color, national origin, age, disability sex, sexual orientation or gender identity.            Thank you!     Thank you for choosing [facility]  for your care. Our goal is always to provide you with excellent care. Hearing back from our patients is one way we can continue to improve our services. Please take a few minutes to complete the written survey that you may receive in the mail after your visit with us. Thank you!             Your Updated Medication List - Protect others around you: Learn how to safely use, store and throw away your medicines at www.disposemymeds.org.          This list is accurate as of: 7/26/17  5:37 PM.  Always use your most recent med list.                   Brand Name Dispense Instructions for use Diagnosis    acetaminophen 500 MG tablet    TYLENOL     Take 500 mg by mouth        aspirin 81 MG tablet     100 tablet    Take 1 tablet (81 mg) by mouth daily    Type 2 diabetes, HbA1C goal < 8% (H)       B-D U/F 31G X 8 MM   Generic drug:  insulin pen needle     200 each    USE FOUR TIMES DAILY AS DIRECTED WITH LANTUS AND PREMEAL INSULIN    Uncontrolled type 2 diabetes mellitus with stage 3 chronic kidney disease, with long-term current use of insulin (H)       BENADRYL 25 MG tablet   Generic drug:  diphenhydrAMINE      Take 25 mg by mouth At Bedtime And 25 mg nightly as needed         blood glucose monitoring lancets     300 each    Use to test blood sugars 3 times daily or as directed. Qty of 1 box= 100 lancets    Uncontrolled type 2 diabetes mellitus with stage 3 chronic kidney disease, with long-term current use of insulin (H)       cholecalciferol 1000 UNITS Tabs      Take 1 tablet by mouth daily        GEODON 80 MG capsule   Generic drug:  ziprasidone      Take 160 mg by mouth every morning And 80 mg daily at 10 AM, and 80 mg nightly as needed        HumaLOG KWIKpen 100 UNIT/ML injection   Generic drug:  insulin lispro      Inject 15 Units Subcutaneous 3 times daily (before meals)        insulin glargine U-300 300 UNIT/ML injection    TOUJEO SOLOSTAR    1.5 mL    Inject 100 Units Subcutaneous At Bedtime    Uncontrolled type 2 diabetes mellitus with stage 3 chronic kidney disease, with long-term current use of insulin (H)       LORazepam 0.5 MG tablet    ATIVAN     0.5 mg Reported on 4/10/2017        NYSTOP [id number] UNIT/GM Powd   Generic drug:  nystatin     15 g    APPLY TO GROIN AND UNDER BREAST TWICE DAILY UNTIL REDNESS HAS RESOLVED.    Candidal intertrigo       * ONE TOUCH VERIO IQ test strip   Generic drug:  blood glucose monitoring     100 strip    TEST THREE TIMES DAILY OR AS DIRECTED    Uncontrolled type 2 diabetes mellitus with stage 3 chronic kidney disease, with long-term current use of insulin (H)       * blood glucose monitoring test strip    no brand specified    300 strip    Use to test blood sugars 3 times daily or as directed    Uncontrolled type 2 diabetes mellitus with stage 3 chronic kidney disease, with long-term current use of insulin (H)       * order for DME     1 Device    Equipment being ordered: CPAP and associated supplies and tubing    Obstructive sleep apnea       * order for DME     1 Device    Equipment being ordered: CPAP machine set at 15 pressure, heated humidifier, supplies: mask and tubing ( supplies)    Obstructive sleep apnea       * order for DME     1  each    Equipment being ordered: Lift Chair    Morbid obesity due to excess calories (H), Chronic bilateral low back pain without sciatica       * order for DME     1 Device    Equipment being ordered: corset to prevent compulsive skin picking of abdomen    Compulsive skin picking       PARoxetine 40 MG tablet    PAXIL     Take 80 mg by mouth daily. 80mg=2 tablets.        pravastatin 40 MG tablet    PRAVACHOL    90 tablet    Take 1 tablet (40 mg) by mouth daily    Hyperlipidemia LDL goal <100       senna-docusate 8.6-50 MG per tablet    SENOKOT-S;PERICOLACE     Take 2 tablets by mouth 2 times daily as needed for constipation        traMADol 50 MG tablet    ULTRAM    60 tablet    Take 1 tablet (50 mg) by mouth 2 times daily as needed for moderate pain Please make appointment within 30 days    Chronic bilateral low back pain without sciatica       valACYclovir 500 MG tablet    VALTREX    90 tablet    TAKE ONE TABLET BY MOUTH ONE TIME DAILY    Recurrent genital HSV (herpes simplex virus) infection       valsartan-hydrochlorothiazide 160-25 MG per tablet    DIOVAN-HCT    90 tablet    Take 1 tablet by mouth daily    Uncontrolled type 2 diabetes mellitus with stage 3 chronic kidney disease, with long-term current use of insulin (H)       * Notice:  This list has 6 medication(s) that are the same as other medications prescribed for you. Read the directions carefully, and ask your doctor or other care provider to review them with you.

## 2017-07-26 NOTE — PROGRESS NOTES
"Marina Valdez is a 66 year old female, , she presents today with discharge.  She feels that it is from the vagina.  She states that she sees brown, red and black soilage of her undergarments.  This has been going on for \"some time.\"   She has a BM daily, by 10 am.  She attempts to shower daily also.    She has not had any itching or unusual vaginal odor.    She had FIGO Stage 1A grade 1 endometrial cancer 2016.  Her follow up is to be every 6 months.    She brought up her sciatic nerve pain at least 3 times.     Past Medical History:   Diagnosis Date     Bipolar affect, depressed (H)      Diabetes      Endometrial cancer (H)      HTN (hypertension)      Morbid obesity due to excess calories (H)      CORDELL (obstructive sleep apnea)     uses CPAP     Renal disease      Schizophrenia (H)      Past Surgical History:   Procedure Laterality Date     cataract      bilateral     CYSTOSCOPY N/A 2016    Procedure: CYSTOSCOPY;  Surgeon: Franca Rousseau MD;  Location: [facility] OR     DAVINCI HYSTERECTOMY TOTAL, BILATERAL SALPINGO-OOPHORECTOMY, COMBINED N/A 2016    Procedure: COMBINED DAVINCI HYSTERECTOMY TOTAL, SALPINGO-OOPHORECTOMY;  Surgeon: Franca Rousseau MD;  Location: [facility] OR     LAPAROSCOPY      for endometriosis     OOPHORECTOMY      right     WRIST SURGERY      right        Allergies   Allergen Reactions     Other [Seasonal Allergies]      Hayfever       Current Outpatient Prescriptions   Medication Sig Dispense Refill     traMADol (ULTRAM) 50 MG tablet Take 1 tablet (50 mg) by mouth 2 times daily as needed for moderate pain Please make appointment within 30 days 60 tablet 0     blood glucose monitoring (ONE TOUCH DELICA) lancets Use to test blood sugars 3 times daily or as directed. Qty of 1 box= 100 lancets 300 each 3     valsartan-hydrochlorothiazide (DIOVAN-HCT) 160-25 MG per tablet Take 1 tablet by mouth daily 90 tablet 1     pravastatin (PRAVACHOL) 40 MG tablet Take 1 tablet (40 " mg) by mouth daily 90 tablet 3     blood glucose monitoring (NO BRAND SPECIFIED) test strip Use to test blood sugars 3 times daily or as directed 300 strip 3     insulin glargine U-300 (TOUJEO SOLOSTAR) 300 UNIT/ML injection Inject 100 Units Subcutaneous At Bedtime 1.5 mL 3     NYSTOP [id number] UNIT/GM POWD powder APPLY TO GROIN AND UNDER BREAST TWICE DAILY UNTIL REDNESS HAS RESOLVED. 15 g 1     B-D U/F 31G X 8 MM insulin pen needle USE FOUR TIMES DAILY AS DIRECTED WITH LANTUS AND PREMEAL INSULIN 200 each 3     acetaminophen (TYLENOL) 500 MG tablet Take 500 mg by mouth       LORazepam (ATIVAN) 0.5 MG tablet 0.5 mg Reported on 4/10/2017  0     valACYclovir (VALTREX) 500 MG tablet TAKE ONE TABLET BY MOUTH ONE TIME DAILY 90 tablet 1     diphenhydrAMINE (BENADRYL) 25 MG tablet Take 25 mg by mouth At Bedtime And 25 mg nightly as needed       cholecalciferol 1000 UNITS TABS Take 1 tablet by mouth daily       ziprasidone (GEODON) 80 MG capsule Take 160 mg by mouth every morning And 80 mg daily at 10 AM, and 80 mg nightly as needed       senna-docusate (SENOKOT-S;PERICOLACE) 8.6-50 MG per tablet Take 2 tablets by mouth 2 times daily as needed for constipation       insulin lispro (HUMALOG KWIKPEN) 100 UNIT/ML injection Inject 15 Units Subcutaneous 3 times daily (before meals)       ONE TOUCH VERIO IQ test strip TEST THREE TIMES DAILY OR AS DIRECTED 100 strip 9     order for DME Equipment being ordered: corset to prevent compulsive skin picking of abdomen 1 Device 0     order for DME Equipment being ordered: Lift Chair 1 each 0     aspirin 81 MG tablet Take 1 tablet (81 mg) by mouth daily 100 tablet 3     ORDER FOR DME Equipment being ordered: CPAP machine set at 15 pressure, heated humidifier, supplies: mask and tubing ( supplies) 1 Device 1     ORDER FOR DME Equipment being ordered: CPAP and associated supplies and tubing 1 Device 0     PAROXETINE HCL 40 MG OR TABS Take 80 mg by mouth daily. 80mg=2 tablets.          Social  History     Social History     Marital status:      Spouse name: N/A     Number of children: N/A     Years of education: N/A     Occupational History     Not on file.     Social History Main Topics     Smoking status: Former Smoker     Packs/day: 1.00     Years: 0.00     Types: Cigarettes     Quit date: 1/1/1990     Smokeless tobacco: Never Used      Comment: quit 1990     Alcohol use No     Drug use: No     Sexual activity: No     Other Topics Concern     Not on file     Social History Narrative       Family History   Problem Relation Age of Onset     HEART DISEASE Mother      DIABETES Mother      Hypertension Mother      Psychotic Disorder Mother      schitzophrenia     Hypertension Father      Blood Disease Father      high iron level     HEART DISEASE Maternal Grandmother      CEREBROVASCULAR DISEASE Maternal Grandmother      HEART DISEASE Maternal Grandfather      Breast Cancer Paternal Grandmother      Breast Cancer Sister      HEART DISEASE Brother      Ovarian Cancer Paternal Aunt      Kidney Cancer Daughter      Uterine Cancer Daughter      Uterine Cancer Sister        Review of Systems:  10 point ROS of systems including Constitutional, Eyes, Respiratory, Cardiovascular, Gastroenterology, Genitourinary, Integumentary, Muscularskeletal, Psychiatric were all negative except for pertinent positives noted in my HPI and in the PMH.      Exam:  /85 (BP Location: Right arm, Cuff Size: Adult Regular)  Pulse 114  Wt (!) 321 lb (145.6 kg)  LMP 03/24/2010  SpO2 91%  BMI 54.8 kg/m2  General:  WNWD female, NAD  Alert  Oriented x 3  Gait:  Normal  Skin:  Normal skin turgor  HEENT:  NC/AT, EOMI  Abdomen:  Very increased girth noted.  Vulva: No external lesions, normal hair distribution, no adenopathy  BUS:  Normal, no masses noted  Urethra:  No hypermobility seen  Urethral meatus:  No masses noted  Vagina: Moist, pink, no abnormal discharge, no lesions  Cervix: absent  Uterus: absent    Ovaries/Bimanual: no masses, difficult to palpate secondary to the abdominal girth.   Perianal:  No masses noted  Rectal exam: not performed   Extremities:  No clubbing, no cyanosis and no edema.      Assessment  Discharge  Endometrial cancer.       Plan  I discussed the findings with the patient.   The history and the exam suggest the soilage of the undergarments is fecal soilage.  It is likely that she is not able to clean adequately after BM.  I don't think this is vaginal in origin.  If the red finding is blood, then she should be following up with GI.   Follow up with Dr. Roberson regarding the sciatic pain.   Since she is to follow up every 6 months for the endometrial cancer, I suggest that this is a visit, and she may return 6 months from now, which will be one year from surgery.     Dyllan Alvares MD

## 2017-07-26 NOTE — NURSING NOTE
"Chief Complaint   Patient presents with     Vaginal Problem     vaginal discharge and odor some spotting       Initial /85 (BP Location: Right arm, Cuff Size: Adult Regular)  Pulse 114  Wt (!) 321 lb (145.6 kg)  LMP 03/24/2010  SpO2 91%  BMI 54.8 kg/m2 Estimated body mass index is 54.8 kg/(m^2) as calculated from the following:    Height as of 4/18/17: 5' 4.17\" (1.63 m).    Weight as of this encounter: 321 lb (145.6 kg).  Medication Reconciliation: complete   ORLANDO Elliott 7/26/2017         "

## 2017-08-04 ENCOUNTER — TELEPHONE (OUTPATIENT)
Dept: INTERNAL MEDICINE | Facility: CLINIC | Age: 67
End: 2017-08-04

## 2017-08-04 NOTE — TELEPHONE ENCOUNTER
**Patient is not due for DM check/A1C as they were just seen/checked A1C 7/10/2017.**  Panel Management Review      Patient has the following on her problem list:     Diabetes    ASA: Passed    Last A1C  Lab Results   Component Value Date    A1C 8.4 07/10/2017    A1C 7.1 04/18/2017    A1C 9.4 11/11/2016    A1C 10.2 06/14/2016    A1C 7.9 02/11/2016     A1C tested: FAILED    Last LDL:    Lab Results   Component Value Date    CHOL 129 06/14/2016     Lab Results   Component Value Date    HDL 69 06/14/2016     Lab Results   Component Value Date    LDL 49 07/10/2017    LDL 42 06/14/2016     Lab Results   Component Value Date    TRIG 90 06/14/2016     Lab Results   Component Value Date    CHOLHDLRATIO 2.2 07/09/2015     Lab Results   Component Value Date    NHDL 60 06/14/2016       Is the patient on a Statin? YES             Is the patient on Aspirin? YES    Medications     HMG CoA Reductase Inhibitors    pravastatin (PRAVACHOL) 40 MG tablet    Salicylates    aspirin 81 MG tablet          Last three blood pressure readings:  BP Readings from Last 3 Encounters:   07/26/17 131/85   07/10/17 124/76   04/18/17 124/60       Date of last diabetes office visit: 7/10/2017     Tobacco History:     History   Smoking Status     Former Smoker     Packs/day: 1.00     Years: 0.00     Types: Cigarettes     Quit date: 1/1/1990   Smokeless Tobacco     Never Used     Comment: quit 1990             Composite cancer screening  Chart review shows that this patient is due/due soon for the following None  Summary:    Patient is due/failing the following:   none    Action needed:   Patient needs office visit for none.    Type of outreach:    none    Questions for provider review:    None                                                                                                                                    Emely Bernal CMA       Chart routed to  .

## 2017-08-07 DIAGNOSIS — Z53.9 DIAGNOSIS NOT YET DEFINED: Primary | ICD-10-CM

## 2017-08-07 PROCEDURE — G0179 MD RECERTIFICATION HHA PT: HCPCS | Performed by: INTERNAL MEDICINE

## 2017-08-26 DIAGNOSIS — A60.00 RECURRENT GENITAL HSV (HERPES SIMPLEX VIRUS) INFECTION: ICD-10-CM

## 2017-08-28 RX ORDER — VALACYCLOVIR HYDROCHLORIDE 500 MG/1
TABLET, FILM COATED ORAL
Qty: 90 TABLET | Refills: 1 | Status: SHIPPED | OUTPATIENT
Start: 2017-08-28 | End: 2018-01-25

## 2017-08-28 NOTE — TELEPHONE ENCOUNTER
Routing refill request to provider for review/approval because:  Labs out of range:  Cr    Magda Tay RN

## 2017-08-28 NOTE — TELEPHONE ENCOUNTER
valACYclovir (VALTREX) 500 MG tablet     Last Written Prescription Date: 02/27/2017  Last Fill Quantity: 90, # refills: 1  Last Office Visit with G, P or [facility] prescribing provider: 07/10/2017   Next 5 appointments (look out 90 days)     Oct 03, 2017  1:00 PM CDT   Office Visit with Dyllan [facility]ares MD   [facility] [facility] [address]   [phone]                   Creatinine   Date Value Ref Range Status   04/18/2017 1.27 (H) 0.52 - 1.04 mg/dL Final     Isamar Ayers MA

## 2017-09-05 NOTE — TELEPHONE ENCOUNTER
insulin glargine U-300 (TOUJEO SOLOSTAR) 300 UNIT/ML injection        Last Written Prescription Date: 07/10/2017  Last Fill Quantity: 1.5mL, # refills: 3  Last Office Visit with G, [facility] or [facility] prescribing provider:  07/10/2017   Next 5 appointments (look out 90 days)     Oct 03, 2017  1:00 PM CDT   Office Visit with Dyllan Alvares MD   [facility] ([address] [address]   [phone]                   BP Readings from Last 3 Encounters:   07/26/17 131/85   07/10/17 124/76   04/18/17 124/60     Lab Results   Component Value Date    MICROL 8 06/21/2016     Lab Results   Component Value Date    UMALCR 29.37 06/21/2016     Creatinine   Date Value Ref Range Status   04/18/2017 1.27 (H) 0.52 - 1.04 mg/dL Final   ]  GFR Estimate   Date Value Ref Range Status   04/18/2017 42 (L) >60 mL/min/1.7m2 Final     Comment:     Non  GFR Calc   03/03/2017 43 (L) >60 mL/min/1.7m2 Final     Comment:     Non  GFR Calc   11/11/2016 41 (L) >60 mL/min/1.7m2 Final     Comment:     Non  GFR Calc     GFR Estimate If Black   Date Value Ref Range Status   04/18/2017 51 (L) >60 mL/min/1.7m2 Final     Comment:      GFR Calc   03/03/2017 52 (L) >60 mL/min/1.7m2 Final     Comment:      GFR Calc   11/11/2016 49 (L) >60 mL/min/1.7m2 Final     Comment:      GFR Calc     Lab Results   Component Value Date    CHOL 129 06/14/2016     Lab Results   Component Value Date    HDL 69 06/14/2016     Lab Results   Component Value Date    LDL 49 07/10/2017    LDL 42 06/14/2016     Lab Results   Component Value Date    TRIG 90 06/14/2016     Lab Results   Component Value Date    CHOLHDLRATIO 2.2 07/09/2015     Lab Results   Component Value Date    AST 31 03/15/2017     Lab Results   Component Value Date    ALT 27 03/15/2017     Lab Results   Component Value Date    A1C 8.4 07/10/2017    A1C  7.1 04/18/2017    A1C 9.4 11/11/2016    A1C 10.2 06/14/2016    A1C 7.9 02/11/2016     Potassium   Date Value Ref Range Status   04/18/2017 3.6 3.4 - 5.3 mmol/L Final     Isamar Ayers MA

## 2017-09-06 RX ORDER — INSULIN GLARGINE 300 U/ML
INJECTION, SOLUTION SUBCUTANEOUS
Qty: 10 ML | Refills: 1 | Status: SHIPPED | OUTPATIENT
Start: 2017-09-06 | End: 2017-10-17

## 2017-09-06 NOTE — TELEPHONE ENCOUNTER
Prescription approved per [facility] [place] Refill Protocol.  Nadia Calderon, RN - BC

## 2017-09-18 NOTE — PROGRESS NOTES
Clinic Care Coordination Contact  Care Team Conversations    Patient was last contacted 3/6/17. Chart reviewed. No concerns noted at this time. Patient has not outreached to  CC.  Patient has a letter with my contact information and access plan to reference if needed. Will close to active CC outreaches at this time.    MARY Bowers  Care Coordination  [place] [facility]  [phone]  [email or web address]

## 2017-09-26 DIAGNOSIS — G89.29 CHRONIC BILATERAL LOW BACK PAIN WITHOUT SCIATICA: ICD-10-CM

## 2017-09-26 DIAGNOSIS — M54.50 CHRONIC BILATERAL LOW BACK PAIN WITHOUT SCIATICA: ICD-10-CM

## 2017-09-26 DIAGNOSIS — B37.2 CANDIDAL INTERTRIGO: ICD-10-CM

## 2017-09-26 NOTE — TELEPHONE ENCOUNTER
Controlled Substance Refill Request for traMADol (ULTRAM) 50 MG tablet  Problem List Complete:  No     PROVIDER TO CONSIDER COMPLETION OF PROBLEM LIST AND OVERVIEW/CONTROLLED SUBSTANCE AGREEMENT    Last Written Prescription Date:  07/11/2017  Last Fill Quantity: 60,   # refills: 0    Last Office Visit with [facility] primary care provider: 07/10/2017    Future Office visit:   Next 5 appointments (look out 90 days)     Oct 03, 2017  1:00 PM CDT   Office Visit with Dyllan Alvares MD   [facility] [facility] [address] [address]   [phone]                  Controlled substance agreement on file: No.     Processing:  NA     checked in past 6 months?  No, route to RN     Isamar Ayers MA

## 2017-09-26 NOTE — TELEPHONE ENCOUNTER
NYSTOP [id number] UNIT/GM POWD powder  Last Written Prescription Date: 06/22/2017  Last Fill Quantity: 15g,  # refills: 1   Last Office Visit with G, [facility] or [facility] prescribing provider: 07/10/2017                                         Next 5 appointments (look out 90 days)     Oct 03, 2017  1:00 PM CDT   Office Visit with Dyllan Alvares MD   [facility] ([facility])    [address] [place] [place] [address] [address]   [phone]                  Isamar Ayers MA

## 2017-09-28 RX ORDER — TRAMADOL HYDROCHLORIDE 50 MG/1
TABLET ORAL
Qty: 60 TABLET | Refills: 0 | Status: SHIPPED | OUTPATIENT
Start: 2017-09-28 | End: 2017-11-02

## 2017-09-28 NOTE — TELEPHONE ENCOUNTER
Routing refill request to provider for review/approval because:  Drug not on the [facility] refill protocol.  RN unable to access  due to it won't open for writer at this time.    Marion COVINGTON RN, BSN

## 2017-09-29 RX ORDER — NYSTATIN 100000 [USP'U]/G
POWDER TOPICAL
Qty: 15 G | Refills: 1 | Status: SHIPPED | OUTPATIENT
Start: 2017-09-29 | End: 2018-01-25

## 2017-09-29 NOTE — TELEPHONE ENCOUNTER
Prescription [place] per [facility] [place].  Clementine Figueredo RN

## 2017-10-02 DIAGNOSIS — Z53.9 DIAGNOSIS NOT YET DEFINED: Primary | ICD-10-CM

## 2017-10-02 PROCEDURE — G0179 MD RECERTIFICATION HHA PT: HCPCS | Performed by: INTERNAL MEDICINE

## 2017-10-10 ENCOUNTER — TELEPHONE (OUTPATIENT)
Dept: INTERNAL MEDICINE | Facility: CLINIC | Age: 67
End: 2017-10-10

## 2017-10-10 DIAGNOSIS — L01.00 IMPETIGO: ICD-10-CM

## 2017-10-10 RX ORDER — MUPIROCIN 20 MG/G
OINTMENT TOPICAL 3 TIMES DAILY
Qty: 22 G | Refills: 0 | Status: SHIPPED | OUTPATIENT
Start: 2017-10-10 | End: 2018-06-23

## 2017-10-10 NOTE — TELEPHONE ENCOUNTER
Prescription sent as previously ordered in 2012 but added instructions to use for 5 days and consider using aquaphor on sores after that time.    Detailed message left on patient's VM with this update  Requested that she call the RN hotline back with any further questions/concerns    Harshal Hayward RN

## 2017-10-10 NOTE — TELEPHONE ENCOUNTER
Impetigo  mupirocin (BACTROBAN) 2 % ointment      Use bactroban on the face for the next 5 days.  Also apply this in the nose. Consider using aquaphor on the sores after that time.  Avoid scratching     Send in prescription if necessary     Mateus Roberson MD

## 2017-10-10 NOTE — TELEPHONE ENCOUNTER
Per patient, she noted drainage and crust around the outside of her right ear x 3 days  She stated that she has had this issue before and Dr. Roberson treated her in the past.  She does not recall what was used for treatment but stated this is not a new issue.    Please advise  Patient requesting a prescription for impetigo of the right ear    Harshal Hayward RN

## 2017-10-10 NOTE — TELEPHONE ENCOUNTER
Reason for call:  Patient reporting a symptom    Symptom or request: Patient has impetigo in her right ear and would like an rx sent to her pharmacy today for . Please call.    Duration (how long have symptoms been present): 3 days    Have you been treated for this before? Yes    Additional comments: please call    Phone Number patient can be reached at:  Home number on file [phone] (home)    Best Time:  any    Can we leave a detailed message on this number:  YES    Call taken on 10/10/2017 at 10:09 AM by Carolynn Bryant

## 2017-10-11 ENCOUNTER — TELEPHONE (OUTPATIENT)
Dept: INTERNAL MEDICINE | Facility: CLINIC | Age: 67
End: 2017-10-11

## 2017-10-14 ENCOUNTER — TELEPHONE (OUTPATIENT)
Dept: FAMILY MEDICINE | Facility: CLINIC | Age: 67
End: 2017-10-14

## 2017-10-14 NOTE — TELEPHONE ENCOUNTER
Caller states that the medication for impetigo in her ear has cause her ear to swell, red hot, and painful, recommended discontinue for now, will send high priority message to provider to see if he wants her discontinue completely and if he wants her to try any medication or treatment for her ear. No urgent concerns currently, no triage required.    Carmel Gomes RN, BSN  [facility]

## 2017-10-16 NOTE — TELEPHONE ENCOUNTER
Patient informed with below orders. Patient schedule appointment for 10/17/17 with PARKER PAREDES/MA

## 2017-10-16 NOTE — TELEPHONE ENCOUNTER
Please call patient.  Should be seen for further eval  Please help her schedule      Harshal Hayward RN

## 2017-10-17 ENCOUNTER — OFFICE VISIT (OUTPATIENT)
Dept: INTERNAL MEDICINE | Facility: CLINIC | Age: 67
End: 2017-10-17
Payer: COMMERCIAL

## 2017-10-17 VITALS
SYSTOLIC BLOOD PRESSURE: 128 MMHG | DIASTOLIC BLOOD PRESSURE: 74 MMHG | OXYGEN SATURATION: 94 % | HEART RATE: 120 BPM | WEIGHT: 293 LBS | BODY MASS INDEX: 53.27 KG/M2 | TEMPERATURE: 98.1 F

## 2017-10-17 DIAGNOSIS — N18.30 CKD (CHRONIC KIDNEY DISEASE) STAGE 3, GFR 30-59 ML/MIN (H): ICD-10-CM

## 2017-10-17 DIAGNOSIS — I10 HYPERTENSION GOAL BP (BLOOD PRESSURE) < 140/90: Primary | ICD-10-CM

## 2017-10-17 DIAGNOSIS — Z91.81 AT RISK FOR FALLING: ICD-10-CM

## 2017-10-17 DIAGNOSIS — E78.5 HYPERLIPIDEMIA WITH TARGET LDL LESS THAN 100: ICD-10-CM

## 2017-10-17 DIAGNOSIS — Z13.89 SCREENING FOR DIABETIC PERIPHERAL NEUROPATHY: ICD-10-CM

## 2017-10-17 DIAGNOSIS — Z23 NEED FOR PROPHYLACTIC VACCINATION AND INOCULATION AGAINST INFLUENZA: ICD-10-CM

## 2017-10-17 DIAGNOSIS — L30.9 ECZEMA, UNSPECIFIED TYPE: ICD-10-CM

## 2017-10-17 DIAGNOSIS — L01.00 IMPETIGO: ICD-10-CM

## 2017-10-17 DIAGNOSIS — F42.4 COMPULSIVE SKIN PICKING: ICD-10-CM

## 2017-10-17 DIAGNOSIS — Z78.0 ASYMPTOMATIC POSTMENOPAUSAL STATUS: ICD-10-CM

## 2017-10-17 LAB
ALP SERPL-CCNC: 209 U/L (ref 40–150)
ALT SERPL W P-5'-P-CCNC: 24 U/L (ref 0–50)
ANION GAP SERPL CALCULATED.3IONS-SCNC: 14 MMOL/L (ref 3–14)
BUN SERPL-MCNC: 24 MG/DL (ref 7–30)
CALCIUM SERPL-MCNC: 10 MG/DL (ref 8.5–10.1)
CHLORIDE SERPL-SCNC: 99 MMOL/L (ref 94–109)
CHOLEST SERPL-MCNC: 122 MG/DL
CO2 SERPL-SCNC: 23 MMOL/L (ref 20–32)
CREAT SERPL-MCNC: 1.36 MG/DL (ref 0.52–1.04)
CREAT UR-MCNC: 202 MG/DL
GFR SERPL CREATININE-BSD FRML MDRD: 39 ML/MIN/1.7M2
GLUCOSE SERPL-MCNC: 205 MG/DL (ref 70–99)
HBA1C MFR BLD: 10.4 % (ref 4.3–6)
HDLC SERPL-MCNC: 71 MG/DL
HGB BLD-MCNC: 13.6 G/DL (ref 11.7–15.7)
LDLC SERPL CALC-MCNC: 29 MG/DL
MICROALBUMIN UR-MCNC: <5 MG/L
MICROALBUMIN/CREAT UR: NORMAL MG/G CR (ref 0–25)
NONHDLC SERPL-MCNC: 51 MG/DL
PHOSPHATE SERPL-MCNC: 3.2 MG/DL (ref 2.5–4.5)
POTASSIUM SERPL-SCNC: 3.7 MMOL/L (ref 3.4–5.3)
PTH-INTACT SERPL-MCNC: 30 PG/ML (ref 12–72)
SODIUM SERPL-SCNC: 136 MMOL/L (ref 133–144)
TRIGL SERPL-MCNC: 109 MG/DL

## 2017-10-17 PROCEDURE — 80061 LIPID PANEL: CPT | Performed by: INTERNAL MEDICINE

## 2017-10-17 PROCEDURE — 83036 HEMOGLOBIN GLYCOSYLATED A1C: CPT | Performed by: INTERNAL MEDICINE

## 2017-10-17 PROCEDURE — 99214 OFFICE O/P EST MOD 30 MIN: CPT | Performed by: INTERNAL MEDICINE

## 2017-10-17 PROCEDURE — 80048 BASIC METABOLIC PNL TOTAL CA: CPT | Performed by: INTERNAL MEDICINE

## 2017-10-17 PROCEDURE — 82306 VITAMIN D 25 HYDROXY: CPT | Performed by: INTERNAL MEDICINE

## 2017-10-17 PROCEDURE — 36415 COLL VENOUS BLD VENIPUNCTURE: CPT | Performed by: INTERNAL MEDICINE

## 2017-10-17 PROCEDURE — 85018 HEMOGLOBIN: CPT | Performed by: INTERNAL MEDICINE

## 2017-10-17 PROCEDURE — 84075 ASSAY ALKALINE PHOSPHATASE: CPT | Performed by: INTERNAL MEDICINE

## 2017-10-17 PROCEDURE — 84460 ALANINE AMINO (ALT) (SGPT): CPT | Performed by: INTERNAL MEDICINE

## 2017-10-17 PROCEDURE — 99207 C FOOT EXAM  NO CHARGE: CPT | Performed by: INTERNAL MEDICINE

## 2017-10-17 PROCEDURE — 82043 UR ALBUMIN QUANTITATIVE: CPT | Performed by: INTERNAL MEDICINE

## 2017-10-17 PROCEDURE — 84100 ASSAY OF PHOSPHORUS: CPT | Performed by: INTERNAL MEDICINE

## 2017-10-17 PROCEDURE — 83970 ASSAY OF PARATHORMONE: CPT | Performed by: INTERNAL MEDICINE

## 2017-10-17 RX ORDER — HYDROCORTISONE VALERATE 2 MG/G
OINTMENT TOPICAL
Qty: 15 G | Refills: 0 | Status: SHIPPED | OUTPATIENT
Start: 2017-10-17 | End: 2017-11-02

## 2017-10-17 ASSESSMENT — PAIN SCALES - GENERAL: PAINLEVEL: MODERATE PAIN (4)

## 2017-10-17 NOTE — PATIENT INSTRUCTIONS
- I will follow up with you about lab results.     - Follow up for a DEXA scan.    [facility]    If you have any questions regarding to your visit please contact your care team:     Team Pink:   Clinic Hours Telephone Number   Internal Medicine:  Dr. Jeanie Decker NP       7am-7pm  Monday - Thursday   7am-5pm  Fridays  [phone]  (Appointment scheduling available 24/7)    Questions about your visit?  Team Line  [phone]   Urgent Care - [place] and [place] - 11am-9pm Monday-Friday Saturday-Sunday- 9am-5pm   [place] - 5pm-9pm Monday-Friday Saturday-Sunday- 9am-5pm  [phone] - [place]   [phone] - [place]       What options do I have for visits at the clinic other than the traditional office visit?  To expand how we care for you, many of our providers are utilizing electronic visits (e-visits) and telephone visits, when medically appropriate, for interactions with their patients rather than a visit in the clinic.   We also offer nurse visits for many medical concerns. Just like any other service, we will bill your insurance company for this type of visit based on time spent on the phone with your provider. Not all insurance companies cover these visits. Please check with your medical insurance if this type of visit is covered. You will be responsible for any charges that are not paid by your insurance.      E-visits via [employer]:  generally incur a $35.00 fee.  Telephone visits:  Time spent on the phone: *charged based on time that is spent on the phone in increments of 10 minutes. Estimated cost:   5-10 mins $30.00   11-20 mins. $59.00   21-30 mins. $85.00   Use [employer] (secure email communication and access to your chart) to send your primary care provider a message or make an appointment. Ask someone on your Team how to sign up for [employer].    For a Price Quote for your services, please call our Consumer Price Line at  [phone].    As always, Thank you for trusting us with your health care needs!    Radha CHEN MA

## 2017-10-17 NOTE — MR AVS SNAPSHOT
After Visit Summary   10/17/2017    Marina Valdez    MRN: [id number]           Patient Information     Date Of Birth          1950        Visit Information        Provider Department      10/17/2017 11:30 AM Mateus Roberson MD [facility]        Today's Diagnoses     Hypertension goal BP (blood pressure) < 140/90    -  1    Need for prophylactic vaccination and inoculation against influenza        Asymptomatic postmenopausal status        At risk for falling        Screening for diabetic peripheral neuropathy        CKD (chronic kidney disease) stage 3, GFR 30-59 ml/min        Hyperlipidemia with target LDL less than 100        Uncontrolled type 2 diabetes mellitus with stage 3 chronic kidney disease, with long-term current use of insulin (H)        Impetigo          Care Instructions    - I will follow up with you about lab results.     - Follow up for a DEXA scan.    [facility]    If you have any questions regarding to your visit please contact your care team:     Team Pink:   Clinic Hours Telephone Number   Internal Medicine:  Dr. Jeanie Decker NP       7am-7pm  Monday - Thursday   7am-5pm  Fridays  [phone]  (Appointment scheduling available 24/7)    Questions about your visit?  Team Line  [phone]   Urgent Care - [place] - 11am-9pm Monday-Friday Saturday-Sunday- 9am-5pm   [place] - 5pm-9pm Monday-Friday Saturday-Sunday- 9am-5pm  [phone] - [place]   [phone] - [place]       What options do I have for visits at the clinic other than the traditional office visit?  To expand how we care for you, many of our providers are utilizing electronic visits (e-visits) and telephone visits, when medically appropriate, for interactions with their patients rather than a visit in the clinic.   We also offer nurse visits for many medical concerns. Just like any other service, we will bill  your insurance company for this type of visit based on time spent on the phone with your provider. Not all insurance companies cover these visits. Please check with your medical insurance if this type of visit is covered. You will be responsible for any charges that are not paid by your insurance.      E-visits via [email or web address]:  generally incur a $35.00 fee.  Telephone visits:  Time spent on the phone: *charged based on time that is spent on the phone in increments of 10 minutes. Estimated cost:   5-10 mins $30.00   11-20 mins. $59.00   21-30 mins. $85.00   Use [email or web address] (secure email communication and access to your chart) to send your primary care provider a message or make an appointment. Ask someone on your Team how to sign up for [email or web address].    For a Price Quote for your services, please call our [employer] Price Line at [phone].    As always, Thank you for trusting us with your health care needs!    Radha CHEN MA            Follow-ups after your visit        Your next 10 appointments already scheduled     Dec 29, 2017 11:00 AM CST   Office Visit with Dyllan Alvares MD   [facility] ([facility])    [address] [place] [id number] [phone]           Bring a current list of meds and any records pertaining to this visit. For Physicals, please bring immunization records and any forms needing to be filled out. Please arrive 10 minutes early to complete paperwork.              Future tests that were ordered for you today     Open Future Orders        Priority Expected Expires Ordered    DEXA HIP/PELVIS/SPINE - Future Routine  10/17/2018 10/17/2017            Who to contact     If you have questions or need follow up information about today's clinic visit or your schedule please contact [facility] directly at [phone].  Normal or non-critical lab and imaging results will be communicated to you by MyChart, letter or phone within 4 business days after the  "clinic has received the results. If you do not hear from us within 7 days, please contact the clinic through [employer] or phone. If you have a critical or abnormal lab result, we will notify you by phone as soon as possible.  Submit refill requests through [employer] or call your pharmacy and they will forward the refill request to us. Please allow 3 business days for your refill to be completed.          Additional Information About Your Visit        [employer] Information     [employer] lets you send messages to your doctor, view your test results, renew your prescriptions, schedule appointments and more. To sign up, go to [email or web address] . Click on \"Log in\" on the left side of the screen, which will take you to the Welcome page. Then click on \"Sign up Now\" on the right side of the page.     You will be asked to enter the access code listed below, as well as some personal information. Please follow the directions to create your username and password.     Your access code is: [email or web address]  Expires: 1/15/2018 11:46 AM     Your access code will  in 90 days. If you need help or a new code, please call your [place] clinic or [phone].        Care EveryWhere ID     This is your Care EveryWhere ID. This could be used by other organizations to access your [place] medical records  [id number]        Your Vitals Were     Pulse Temperature Last Period Pulse Oximetry BMI (Body Mass Index)       120 98.1  F (36.7  C) (Oral) 2010 94% 53.27 kg/m2        Blood Pressure from Last 3 Encounters:   10/17/17 128/74   17 131/85   07/10/17 124/76    Weight from Last 3 Encounters:   10/17/17 (!) 312 lb (141.5 kg)   17 (!) 321 lb (145.6 kg)   07/10/17 (!) 321 lb 8 oz (145.8 kg)              We Performed the Following     Albumin Random Urine Quantitative with Creat Ratio     Alkaline phosphatase     ALT     BASIC METABOLIC PANEL     FOOT EXAM  NO CHARGE [78739.114]     Hemoglobin A1c     Hemoglobin     Lipid " panel reflex to direct LDL     Parathyroid Hormone Intact     Phosphorus     Vitamin D Deficiency          Today's Medication Changes          These changes are accurate as of: 10/17/17 11:46 AM.  If you have any questions, ask your nurse or doctor.               These medicines have changed or have updated prescriptions.        Dose/Directions    insulin glargine U-300 300 UNIT/ML injection   Commonly known as:  TOUJEO SOLLUIS A   This may have changed:  See the new instructions.   Used for:  Uncontrolled type 2 diabetes mellitus with stage 3 chronic kidney disease, with long-term current use of insulin (H)   Changed by:  Mateus Roberson MD        INJECT 96 UNITS UNDER THE SKIN AT BEDTIME   Quantity:  10 mL   Refills:  1            Where to get your medicines      These medications were sent to [place] [address] [facility] - [place], [place] - [address] [facility]  [address] [facility] [address]     Phone:  [phone]     insulin glargine U-300 300 UNIT/ML injection                Primary Care Provider Office Phone # Fax #    Mateus Roberson [phone]       [address] [place] [address]        Equal Access to Services     [facility] AH: Hadii omari ku hadasho Soomaali, waaxda luqadaha, qaybta kaalmada adeegyada, sanjana ontiveros hayadrien vega . So [facility] [phone].    ATENCIÓN: Si habla español, tiene a garza disposición servicios gratuitos de asistencia lingüística. [facility] [phone].    We comply with applicable federal civil rights laws and [place] laws. We do not discriminate on the basis of race, color, national origin, age, disability, sex, sexual orientation, or gender identity.            Thank you!     Thank you for choosing [facility]  for your care. Our goal is always to provide you with excellent care. Hearing back from our patients is one way we can continue to improve our services. Please take a few minutes to complete the written survey that  you may receive in the mail after your visit with us. Thank you!             Your Updated Medication List - Protect others around you: Learn how to safely use, store and throw away your medicines at www.disposemymeds.org.          This list is accurate as of: 10/17/17 11:46 AM.  Always use your most recent med list.                   Brand Name Dispense Instructions for use Diagnosis    acetaminophen 500 MG tablet    TYLENOL     Take 500 mg by mouth        aspirin 81 MG tablet     100 tablet    Take 1 tablet (81 mg) by mouth daily    Type 2 diabetes, HbA1C goal < 8% (H)       B-D U/F 31G X 8 MM   Generic drug:  insulin pen needle     200 each    USE FOUR TIMES DAILY AS DIRECTED WITH LANTUS AND PREMEAL INSULIN    Uncontrolled type 2 diabetes mellitus with stage 3 chronic kidney disease, with long-term current use of insulin (H)       BENADRYL 25 MG tablet   Generic drug:  diphenhydrAMINE      Take 25 mg by mouth At Bedtime And 25 mg nightly as needed        blood glucose monitoring lancets     300 each    Use to test blood sugars 3 times daily or as directed. Qty of 1 box= 100 lancets    Uncontrolled type 2 diabetes mellitus with stage 3 chronic kidney disease, with long-term current use of insulin (H)       cholecalciferol 1000 UNITS Tabs      Take 1 tablet by mouth daily        GEODON 80 MG capsule   Generic drug:  ziprasidone      Take 160 mg by mouth every morning And 80 mg daily at 10 AM, and 80 mg nightly as needed        HumaLOG KWIKpen 100 UNIT/ML injection   Generic drug:  insulin lispro      Inject 15 Units Subcutaneous 3 times daily (before meals)        insulin glargine U-300 300 UNIT/ML injection    TOUJEO SOLOSTAR    10 mL    INJECT 96 UNITS UNDER THE SKIN AT BEDTIME    Uncontrolled type 2 diabetes mellitus with stage 3 chronic kidney disease, with long-term current use of insulin (H)       LORazepam 0.5 MG tablet    ATIVAN     0.5 mg Reported on 4/10/2017        NYSTOP 298956 UNIT/GM Powd   Generic  drug:  nystatin     15 g    APPLY TO GROIN AND UNDER BREAST TWICE DAILY UNTIL REDNESS HAS RESOLVED.    Candidal intertrigo       * ONE TOUCH VERIO IQ test strip   Generic drug:  blood glucose monitoring     100 strip    TEST THREE TIMES DAILY OR AS DIRECTED    Uncontrolled type 2 diabetes mellitus with stage 3 chronic kidney disease, with long-term current use of insulin (H)       * blood glucose monitoring test strip    no brand specified    300 strip    Use to test blood sugars 3 times daily or as directed    Uncontrolled type 2 diabetes mellitus with stage 3 chronic kidney disease, with long-term current use of insulin (H)       * order for DME     1 Device    Equipment being ordered: CPAP and associated supplies and tubing    Obstructive sleep apnea       * order for DME     1 Device    Equipment being ordered: CPAP machine set at 15 pressure, heated humidifier, supplies: mask and tubing ( supplies)    Obstructive sleep apnea       * order for DME     1 each    Equipment being ordered: Lift Chair    Morbid obesity due to excess calories (H), Chronic bilateral low back pain without sciatica       * order for DME     1 Device    Equipment being ordered: corset to prevent compulsive skin picking of abdomen    Compulsive skin picking       PARoxetine 40 MG tablet    PAXIL     Take 80 mg by mouth daily. 80mg=2 tablets.        pravastatin 40 MG tablet    PRAVACHOL    90 tablet    Take 1 tablet (40 mg) by mouth daily    Hyperlipidemia LDL goal <100       senna-docusate 8.6-50 MG per tablet    SENOKOT-S;PERICOLACE     Take 2 tablets by mouth 2 times daily as needed for constipation        traMADol 50 MG tablet    ULTRAM    60 tablet    TAKE ONE TABLET BY MOUTH TWICE DAILY AS NEEDED FOR MODERATE PAIN    Chronic bilateral low back pain without sciatica       valACYclovir 500 MG tablet    VALTREX    90 tablet    TAKE ONE TABLET BY MOUTH ONE TIME DAILY    Recurrent genital HSV (herpes simplex virus) infection        valsartan-hydrochlorothiazide 160-25 MG per tablet    DIOVAN-HCT    90 tablet    Take 1 tablet by mouth daily    Uncontrolled type 2 diabetes mellitus with stage 3 chronic kidney disease, with long-term current use of insulin (H)       * Notice:  This list has 6 medication(s) that are the same as other medications prescribed for you. Read the directions carefully, and ask your doctor or other care provider to review them with you.

## 2017-10-17 NOTE — PROGRESS NOTES
SUBJECTIVE:   Marina Valdez is a 67 year old female who presents to clinic today for the following health issues:    Skin Lesion -- Patient states she has Impetigo in her bilateral ears. The medication she was given [ Bactroban 2% cream ] made her ears edematous, burn, and itchy. Her ears began being painful and crusty about 2 weeks ago. In the past, this condition was treated with Bactroban. She scratches her ears occasionally. She has a compulsive skin picking disorder along with her schizophrenia diagnosis and super-morbid obesity with body mass index above 50 Body mass index is 53.27 kg/(m^2).      Diabetes -- Patient believes her diabetes care is going well. Needs a refill on Toujeo today. Currently taking 96 units of Toujeo qd.  Lab Results   Component Value Date    A1C 8.4 07/10/2017    A1C 7.1 04/18/2017    A1C 9.4 11/11/2016    A1C 10.2 06/14/2016    A1C 7.9 02/11/2016     Back Pain -- She states she does back exercises which alleviates her pain for a while, but the pain comes back.    Additional Notes -- Notes she had a left wrist surgery at [facility] in the past.    Problem list and histories reviewed & adjusted, as indicated.  Additional history: as documented    Patient Active Problem List   Diagnosis     Hypertension goal BP (blood pressure) < 140/90     Recurrent genital HSV (herpes simplex virus) infection     Intertrigo     Ovarian Mass s/p excision in 2008     Sebaceous cyst     Microalbuminuria     CARDIOVASCULAR SCREENING; LDL GOAL LESS THAN 100     Chest pain at rest x1 episode- resolved at rest 3 hours, assoc with food     Obstructive sleep apnea (on cpap)     24 hour contact given to patient      Hydradenitis     CKD (chronic kidney disease) stage 3, GFR 30-59 ml/min     Health Care Home-Not Active     Compulsive skin picking     Need for prophylactic vaccination and inoculation against influenza     High risk OTC medication or supplement use     Rotator cuff tear     Morbid obesity  due to excess calories (H)     Chronic bilateral low back pain without sciatica     Primary osteoarthritis of both hips     Schizophrenia, unspecified type (H)     Uncontrolled type 2 diabetes mellitus with stage 3 chronic kidney disease, with long-term current use of insulin (H)     Endometrial cancer, grade I (H)     Impetigo     Past Surgical History:   Procedure Laterality Date     cataract      bilateral     CYSTOSCOPY N/A 12/29/2016    Procedure: CYSTOSCOPY;  Surgeon: Franca Rousseau MD;  Location: [facility] OR     DAVINCI HYSTERECTOMY TOTAL, BILATERAL SALPINGO-OOPHORECTOMY, COMBINED N/A 12/29/2016    Procedure: COMBINED DAVINCI HYSTERECTOMY TOTAL, SALPINGO-OOPHORECTOMY;  Surgeon: Franca Rousseau MD;  Location: [facility] OR     LAPAROSCOPY      for endometriosis     OOPHORECTOMY      right     WRIST SURGERY      right       Social History   Substance Use Topics     Smoking status: Former Smoker     Packs/day: 1.00     Years: 0.00     Types: Cigarettes     Quit date: 1/1/1990     Smokeless tobacco: Never Used      Comment: quit 1990     Alcohol use No     Family History   Problem Relation Age of Onset     HEART DISEASE Mother      DIABETES Mother      Hypertension Mother      Psychotic Disorder Mother      schitzophrenia     Hypertension Father      Blood Disease Father      high iron level     HEART DISEASE Maternal Grandmother      CEREBROVASCULAR DISEASE Maternal Grandmother      HEART DISEASE Maternal Grandfather      Breast Cancer Paternal Grandmother      Breast Cancer Sister      HEART DISEASE Brother      Ovarian Cancer Paternal Aunt      Kidney Cancer Daughter      Uterine Cancer Daughter      Uterine Cancer Sister          Current Outpatient Prescriptions   Medication Sig Dispense Refill     NYSTOP 777778 UNIT/GM POWD powder APPLY TO GROIN AND UNDER BREAST TWICE DAILY UNTIL REDNESS HAS RESOLVED. 15 g 1     traMADol (ULTRAM) 50 MG tablet TAKE ONE TABLET BY MOUTH TWICE DAILY AS NEEDED FOR MODERATE  PAIN 60 tablet 0     TOUJEO SOLOSTAR 300 UNIT/ML injection INJECT 100 UNITS UNDER THE SKIN AT BEDTIME 10 mL 1     valACYclovir (VALTREX) 500 MG tablet TAKE ONE TABLET BY MOUTH ONE TIME DAILY 90 tablet 1     blood glucose monitoring (ONE TOUCH DELICA) lancets Use to test blood sugars 3 times daily or as directed. Qty of 1 box= 100 lancets 300 each 3     valsartan-hydrochlorothiazide (DIOVAN-HCT) 160-25 MG per tablet Take 1 tablet by mouth daily 90 tablet 1     pravastatin (PRAVACHOL) 40 MG tablet Take 1 tablet (40 mg) by mouth daily 90 tablet 3     blood glucose monitoring (NO BRAND SPECIFIED) test strip Use to test blood sugars 3 times daily or as directed 300 strip 3     B-D U/F 31G X 8 MM insulin pen needle USE FOUR TIMES DAILY AS DIRECTED WITH LANTUS AND PREMEAL INSULIN 200 each 3     acetaminophen (TYLENOL) 500 MG tablet Take 500 mg by mouth       LORazepam (ATIVAN) 0.5 MG tablet 0.5 mg Reported on 4/10/2017  0     diphenhydrAMINE (BENADRYL) 25 MG tablet Take 25 mg by mouth At Bedtime And 25 mg nightly as needed       cholecalciferol 1000 UNITS TABS Take 1 tablet by mouth daily       ziprasidone (GEODON) 80 MG capsule Take 160 mg by mouth every morning And 80 mg daily at 10 AM, and 80 mg nightly as needed       senna-docusate (SENOKOT-S;PERICOLACE) 8.6-50 MG per tablet Take 2 tablets by mouth 2 times daily as needed for constipation       insulin lispro (HUMALOG KWIKPEN) 100 UNIT/ML injection Inject 15 Units Subcutaneous 3 times daily (before meals)       ONE TOUCH VERIO IQ test strip TEST THREE TIMES DAILY OR AS DIRECTED 100 strip 9     order for DME Equipment being ordered: corset to prevent compulsive skin picking of abdomen 1 Device 0     order for DME Equipment being ordered: Lift Chair 1 each 0     aspirin 81 MG tablet Take 1 tablet (81 mg) by mouth daily 100 tablet 3     ORDER FOR DME Equipment being ordered: CPAP machine set at 15 pressure, heated humidifier, supplies: mask and tubing ( supplies) 1  Device 1     ORDER FOR DME Equipment being ordered: CPAP and associated supplies and tubing 1 Device 0     PAROXETINE HCL 40 MG OR TABS Take 80 mg by mouth daily. 80mg=2 tablets.        Labs reviewed in EPIC      Reviewed and updated as needed this visit by clinical staff  Tobacco  Allergies  Meds  Med Hx  Surg Hx  Fam Hx  Soc Hx      Reviewed and updated as needed this visit by Provider         ROS:  Constitutional, HEENT, cardiovascular, pulmonary, GI, , musculoskeletal, neuro, skin, endocrine and psych systems are negative, except as otherwise noted.    This document serves as a record of the services and decisions personally performed and made by Mateus Roberson MD. It was created on their behalf by Modesto Morris, a trained medical scribe. The creation of this document is based the provider's statements to the medical scribe.  Modesto Morris October 17, 2017 11:41 AM    OBJECTIVE:   /74  Pulse 120  Temp 98.1  F (36.7  C) (Oral)  Wt (!) 141.5 kg (312 lb)  LMP 03/24/2010  SpO2 94%  BMI 53.27 kg/m2  Body mass index is 53.27 kg/(m^2).  GENERAL: morbidly obese, alert and no distress  EYES: Eyes grossly normal to inspection, EOMI, conjunctivae and sclerae normal  HENT: she has evidence of compulsive skin picking around face and ears with some lowgrade evidence of infection / impetigo [ usually a streptococcal skin infection ] , more consistent with eczema in my opinion   MS: no gross musculoskeletal defects noted, no edema  SKIN: some inflammation on face and forehead  NEURO: mentation intact and speech normal  PSYCH: mentation appears normal, affect normal/bright  Diabetic Foot Exam: no lesions or ulcers, normal sensation, normal pulses    Diagnostic Test Results:  Results for orders placed or performed in visit on 07/10/17   LDL cholesterol direct   Result Value Ref Range    LDL Cholesterol Direct 49 <100 mg/dL   Hemoglobin A1c   Result Value Ref Range    Hemoglobin A1C 8.4 (H) 4.3 - 6.0 %      ASSESSMENT/PLAN:     (I10) Hypertension goal BP (blood pressure) < 140/90  (primary encounter diagnosis)  Comment:   BP Readings from Last 3 Encounters:   10/17/17 128/74   07/26/17 131/85   07/10/17 124/76       Plan: BASIC METABOLIC PANEL            (Z23) Need for prophylactic vaccination and inoculation against influenza  Comment:   Plan:     (Z78.0) Asymptomatic postmenopausal status  Comment: we reviewed why she should have this test and what it entails  Plan: DEXA HIP/PELVIS/SPINE - Future            (Z91.81) At risk for falling  Comment:   Plan:     (Z13.89) Screening for diabetic peripheral neuropathy  Comment: no diabetes neuropathy is present  Plan: FOOT EXAM  NO CHARGE [47128.114]            (N18.3) CKD (chronic kidney disease) stage 3, GFR 30-59 ml/min  Comment: due for laboratory studies today , annual labs for chronic kidney disease stage 3   Plan: BASIC METABOLIC PANEL, Hemoglobin, Albumin         Random Urine Quantitative with Creat Ratio,         Phosphorus, Alkaline phosphatase, Parathyroid         Hormone Intact, Vitamin D Deficiency, ALT            (E78.5) Hyperlipidemia with target LDL less than 100  Comment: as detailed above   Plan: Lipid panel reflex to direct LDL            (E11.22,  E11.65,  N18.3,  Z79.4) Uncontrolled type 2 diabetes mellitus with stage 3 chronic kidney disease, with long-term current use of insulin (H)  Comment: will recheck hemoglobin a1c  [ diabetes test ]   Plan: BASIC METABOLIC PANEL, Albumin Random Urine         Quantitative with Creat Ratio, Hemoglobin A1c,         insulin glargine U-300 (TOUJEO SOLOSTAR) 300         UNIT/ML injection            (L01.00) Impetigo  Comment: some questions remain if this is infection at all; most consistent with eczema and will treat accordingly  Plan: Hemoglobin, hydrocortisone valerate (WEST-JOE)        0.2 % ointment            (L98.1) Compulsive skin picking  Comment: as detailed above   Plan: hydrocortisone valerate  (WEST-JOE) 0.2 %         ointment            (L30.9) Eczema, unspecified type  Comment: as detailed above   Plan: hydrocortisone valerate (WEST-JOE) 0.2 %         ointment              There are no Patient Instructions on file for this visit.  The information in this document, created by the medical scribe for me, accurately reflects the services I personally performed and the decisions made by me. I have reviewed and approved this document for accuracy.   MD Mateus Eason MD  [facility]

## 2017-10-18 LAB — DEPRECATED CALCIDIOL+CALCIFEROL SERPL-MC: 40 UG/L (ref 20–75)

## 2017-10-25 ENCOUNTER — RADIANT APPOINTMENT (OUTPATIENT)
Dept: BONE DENSITY | Facility: CLINIC | Age: 67
End: 2017-10-25
Attending: INTERNAL MEDICINE
Payer: COMMERCIAL

## 2017-10-25 DIAGNOSIS — Z78.0 ASYMPTOMATIC POSTMENOPAUSAL STATUS: ICD-10-CM

## 2017-10-25 PROCEDURE — 77080 DXA BONE DENSITY AXIAL: CPT | Performed by: INTERNAL MEDICINE

## 2017-11-02 ENCOUNTER — OFFICE VISIT (OUTPATIENT)
Dept: INTERNAL MEDICINE | Facility: CLINIC | Age: 67
End: 2017-11-02
Payer: COMMERCIAL

## 2017-11-02 VITALS — WEIGHT: 293 LBS | BODY MASS INDEX: 55.21 KG/M2 | TEMPERATURE: 97.5 F | OXYGEN SATURATION: 91 % | HEART RATE: 122 BPM

## 2017-11-02 DIAGNOSIS — M54.50 CHRONIC BILATERAL LOW BACK PAIN WITHOUT SCIATICA: ICD-10-CM

## 2017-11-02 DIAGNOSIS — G89.29 CHRONIC BILATERAL LOW BACK PAIN WITHOUT SCIATICA: ICD-10-CM

## 2017-11-02 DIAGNOSIS — F42.4 COMPULSIVE SKIN PICKING: ICD-10-CM

## 2017-11-02 DIAGNOSIS — R07.0 THROAT PAIN: Primary | ICD-10-CM

## 2017-11-02 DIAGNOSIS — L01.00 IMPETIGO: ICD-10-CM

## 2017-11-02 DIAGNOSIS — L30.9 ECZEMA, UNSPECIFIED TYPE: ICD-10-CM

## 2017-11-02 LAB
DEPRECATED S PYO AG THROAT QL EIA: NORMAL
SPECIMEN SOURCE: NORMAL

## 2017-11-02 PROCEDURE — 87081 CULTURE SCREEN ONLY: CPT | Performed by: INTERNAL MEDICINE

## 2017-11-02 PROCEDURE — 87880 STREP A ASSAY W/OPTIC: CPT | Performed by: INTERNAL MEDICINE

## 2017-11-02 PROCEDURE — 99214 OFFICE O/P EST MOD 30 MIN: CPT | Performed by: INTERNAL MEDICINE

## 2017-11-02 RX ORDER — HYDROCORTISONE VALERATE 2 MG/G
OINTMENT TOPICAL
Qty: 15 G | Refills: 0 | Status: SHIPPED | OUTPATIENT
Start: 2017-11-02 | End: 2019-07-15

## 2017-11-02 RX ORDER — LORAZEPAM 0.5 MG/1
0.5 TABLET ORAL
Qty: 60 TABLET | Refills: 0 | Status: CANCELLED | OUTPATIENT
Start: 2017-11-02

## 2017-11-02 RX ORDER — TRAMADOL HYDROCHLORIDE 50 MG/1
TABLET ORAL
Qty: 60 TABLET | Refills: 0 | Status: SHIPPED | OUTPATIENT
Start: 2017-11-02 | End: 2018-01-25

## 2017-11-02 ASSESSMENT — PAIN SCALES - GENERAL: PAINLEVEL: EXTREME PAIN (8)

## 2017-11-02 NOTE — NURSING NOTE
"Chief Complaint   Patient presents with     Pharyngitis     x6 days. due for pHQ2       Initial Pulse 131  Temp 97.5  F (36.4  C) (Oral)  Wt (!) 323 lb 6.4 oz (146.7 kg)  LMP 03/24/2010  SpO2 91%  Breastfeeding? No  BMI 55.21 kg/m2 Estimated body mass index is 55.21 kg/(m^2) as calculated from the following:    Height as of 4/18/17: 5' 4.17\" (1.63 m).    Weight as of this encounter: 323 lb 6.4 oz (146.7 kg).  Medication Reconciliation: complete       Emely Bernal CMA      "

## 2017-11-02 NOTE — PROGRESS NOTES
SUBJECTIVE:   Marina Valdez is a 67 year old female who presents to clinic today for the following health issues:    Back Pain -- Patient reports her right lower back has been painful for the last year. It intermittently shoots down her right leg. Notes she was diagnosed with sciatica in the past. Patient believes her SOB is related to pain. Says SOB and general feeling of illness has been present for a long time. Describes walking into the clinic as very difficult for her. Would like a refill of Ultram [Tramadol]. This is a quite intractable and chronic condition     Sore Throat -- Patient states her throat has been sore for the last week. She notes she also had associated fevers, chills, and rhinitis. Her fever was apparent the other day but is no longer there. Denies lightheadedness.    Impetigo -- She reports she is out of Westcort but still has some lesions on her face. Would like a refill of Westcort. Her impetigo [ usually a streptococcal skin infection ] diagnosis is complicated by compulsive skin picking which clouds ones clinical judgement on this diagnosis .    Diabetes -- this is a patient with a schizophrenia diagnosis and chronic noncompliant behavior . She has a body mass index greater then 50 and is in extremely poor health. It needs to be mentioned that also, this patient is severely deconditioned in my opinion and we reviewed this today. She doesn't have a qualifying condition for oxygen . I conducted a hallway ambulation to test for oxygen saturation and we couldn't push her oxygen saturation any lower then 91% although she did have complaints of shortness of breath at this point . What had also concerned me is that her vital signs flow sheet demonstrate a chronic sinus tachycardia problem which is in my opinion related to deconditioning. See flow sheet.  Lab Results   Component Value Date    A1C 10.4 10/17/2017    A1C 8.4 07/10/2017    A1C 7.1 04/18/2017    A1C 9.4 11/11/2016    A1C 10.2  06/14/2016     Problem list and histories reviewed & adjusted, as indicated.  Additional history: as documented    Patient Active Problem List   Diagnosis     Hypertension goal BP (blood pressure) < 140/90     Recurrent genital HSV (herpes simplex virus) infection     Intertrigo     Ovarian Mass s/p excision in 2008     Sebaceous cyst     Microalbuminuria     Hyperlipidemia with target LDL less than 100     Chest pain at rest x1 episode- resolved at rest 3 hours, assoc with food     Obstructive sleep apnea (on cpap)     24 hour contact given to patient      Hydradenitis     CKD (chronic kidney disease) stage 3, GFR 30-59 ml/min     Health Care Home-Not Active     Compulsive skin picking     Need for prophylactic vaccination and inoculation against influenza     High risk OTC medication or supplement use     Rotator cuff tear     Morbid obesity due to excess calories (H)     Chronic bilateral low back pain without sciatica     Primary osteoarthritis of both hips     Schizophrenia, unspecified type (H)     Uncontrolled type 2 diabetes mellitus with stage 3 chronic kidney disease, with long-term current use of insulin (H)     Endometrial cancer, grade I (H)     Impetigo     Past Surgical History:   Procedure Laterality Date     cataract      bilateral     CYSTOSCOPY N/A 12/29/2016    Procedure: CYSTOSCOPY;  Surgeon: Franca Rousseau MD;  Location: [facility] OR     DAVINCI HYSTERECTOMY TOTAL, BILATERAL SALPINGO-OOPHORECTOMY, COMBINED N/A 12/29/2016    Procedure: COMBINED DAVINCI HYSTERECTOMY TOTAL, SALPINGO-OOPHORECTOMY;  Surgeon: Franca Rousseau MD;  Location: [facility] OR     LAPAROSCOPY      for endometriosis     OOPHORECTOMY      right     WRIST SURGERY      right       Social History   Substance Use Topics     Smoking status: Former Smoker     Packs/day: 1.00     Years: 0.00     Types: Cigarettes     Quit date: 1/1/1990     Smokeless tobacco: Never Used      Comment: quit 1990     Alcohol use No     Family  History   Problem Relation Age of Onset     HEART DISEASE Mother      DIABETES Mother      Hypertension Mother      Psychotic Disorder Mother      schitzophrenia     Hypertension Father      Blood Disease Father      high iron level     HEART DISEASE Maternal Grandmother      CEREBROVASCULAR DISEASE Maternal Grandmother      HEART DISEASE Maternal Grandfather      Breast Cancer Paternal Grandmother      Breast Cancer Sister      HEART DISEASE Brother      Ovarian Cancer Paternal Aunt      Kidney Cancer Daughter      Uterine Cancer Daughter      Uterine Cancer Sister          Current Outpatient Prescriptions   Medication Sig Dispense Refill     hydrocortisone valerate (WEST-JOE) 0.2 % ointment Apply sparingly to affected area three times daily for 14 days. 15 g 0     traMADol (ULTRAM) 50 MG tablet TAKE ONE TABLET BY MOUTH TWICE DAILY AS NEEDED FOR MODERATE PAIN 60 tablet 0     insulin glargine U-300 (TOUJEO SOLOSTAR) 300 UNIT/ML injection INJECT 96 UNITS UNDER THE SKIN AT BEDTIME 10 mL 1     NYSTOP 713958 UNIT/GM POWD powder APPLY TO GROIN AND UNDER BREAST TWICE DAILY UNTIL REDNESS HAS RESOLVED. 15 g 1     valACYclovir (VALTREX) 500 MG tablet TAKE ONE TABLET BY MOUTH ONE TIME DAILY 90 tablet 1     blood glucose monitoring (ONE TOUCH DELICA) lancets Use to test blood sugars 3 times daily or as directed. Qty of 1 box= 100 lancets 300 each 3     valsartan-hydrochlorothiazide (DIOVAN-HCT) 160-25 MG per tablet Take 1 tablet by mouth daily 90 tablet 1     pravastatin (PRAVACHOL) 40 MG tablet Take 1 tablet (40 mg) by mouth daily 90 tablet 3     blood glucose monitoring (NO BRAND SPECIFIED) test strip Use to test blood sugars 3 times daily or as directed 300 strip 3     B-D U/F 31G X 8 MM insulin pen needle USE FOUR TIMES DAILY AS DIRECTED WITH LANTUS AND PREMEAL INSULIN 200 each 3     acetaminophen (TYLENOL) 500 MG tablet Take 500 mg by mouth       diphenhydrAMINE (BENADRYL) 25 MG tablet Take 25 mg by mouth At Bedtime And  25 mg nightly as needed       cholecalciferol 1000 UNITS TABS Take 1 tablet by mouth daily       ziprasidone (GEODON) 80 MG capsule Take 160 mg by mouth every morning And 80 mg daily at 10 AM, and 80 mg nightly as needed       senna-docusate (SENOKOT-S;PERICOLACE) 8.6-50 MG per tablet Take 2 tablets by mouth 2 times daily as needed for constipation       insulin lispro (HUMALOG KWIKPEN) 100 UNIT/ML injection Inject 15 Units Subcutaneous 3 times daily (before meals)       ONE TOUCH VERIO IQ test strip TEST THREE TIMES DAILY OR AS DIRECTED 100 strip 9     order for DME Equipment being ordered: corset to prevent compulsive skin picking of abdomen 1 Device 0     order for DME Equipment being ordered: Lift Chair 1 each 0     aspirin 81 MG tablet Take 1 tablet (81 mg) by mouth daily 100 tablet 3     ORDER FOR DME Equipment being ordered: CPAP machine set at 15 pressure, heated humidifier, supplies: mask and tubing ( supplies) 1 Device 1     ORDER FOR DME Equipment being ordered: CPAP and associated supplies and tubing 1 Device 0     PAROXETINE HCL 40 MG OR TABS Take 80 mg by mouth daily. 80mg=2 tablets.        Labs reviewed in EPIC      Reviewed and updated as needed this visit by clinical staffTobacco  Allergies  Meds  Med Hx  Surg Hx  Fam Hx  Soc Hx      Reviewed and updated as needed this visit by Provider         ROS:  Constitutional, HEENT, cardiovascular, pulmonary, GI, , musculoskeletal, neuro, skin, endocrine and psych systems are negative, except as otherwise noted.     This document serves as a record of the services and decisions personally performed and made by Mateus Roberson MD. It was created on their behalf by Modesto Morris, a trained medical scribe. The creation of this document is based the provider's statements to the medical scribe.  Modesto Morris November 2, 2017 12:09 PM    OBJECTIVE:   Pulse 122  Temp 97.5  F (36.4  C) (Oral)  Wt (!) 323 lb 6.4 oz (146.7 kg)  LMP 03/24/2010  SpO2 91%   Breastfeeding? No  BMI 55.21 kg/m2  Body mass index is 55.21 kg/(m^2).  GENERAL: morbidly obese, alert and no distress, very pleasant elderly woman   EYES: Eyes grossly normal to inspection, EOMI, conjunctivae and sclerae normal  HENT: ear canals and TM's normal, nose and mouth without ulcers or lesions, posterior oropharynx slightly pinkish, no whitish patches  NECK: no adenopathy, no asymmetry, masses, or scars and thyroid normal to palpation  RESP: lungs clear to auscultation - no rales, rhonchi or wheezes, visibly SOB when standing  CV: regular rate and rhythm, normal S1 S2, no S3 or S4, no murmur, click or rub, no peripheral edema and peripheral pulses strong  SKIN: no suspicious lesions or rashes to visible skin , some obvious skin lesions that appear to be inflamed from compulsive skin picking behavior   NEURO: mentation intact and speech normal  PSYCH: mentation appears normal, affect normal/bright    Diagnostic Test Results:  Results for orders placed or performed in visit on 11/02/17   Strep, Rapid Screen   Result Value Ref Range    Specimen Description Throat     Rapid Strep A Screen       NEGATIVE: No Group A streptococcal antigen detected by immunoassay, await culture report.     ASSESSMENT/PLAN:     (R07.0) Throat pain  (primary encounter diagnosis)  Comment: this patient has a viral syndrome / viral illness. Antibiotics are not indicated at this point in my opinion. We discussed treatment plan . Supportive measures reviewed , see patient after-visit summary   Plan: Strep, Rapid Screen, Beta strep group A culture            (L30.9) Eczema, unspecified type  Comment: refills provided with topical steroid cream   Plan: hydrocortisone valerate (WEST-JOE) 0.2 %         ointment        Further follow up depending on how things go     (L98.1) Compulsive skin picking  Comment: as above   Plan: hydrocortisone valerate (WEST-JOE) 0.2 %         ointment            (L01.00) Impetigo  Comment: as above   Plan:  hydrocortisone valerate (WEST-JOE) 0.2 %         ointment            (M54.5,  G89.29) Chronic bilateral low back pain without sciatica  Comment: this is a difficult patient. I am willing to continue tramadol (Ultram) but nothing stronger  Plan: traMADol (ULTRAM) 50 MG tablet            (E11.22,  E11.65,  N18.3,  Z79.4) Uncontrolled type 2 diabetes mellitus with stage 3 chronic kidney disease, with long-term current use of insulin (H)  Comment: she has completely out of control diabetes mellitus,  Plan: this is an entirely different set of problems and concerns. We reviewed issues , she declines certified diabetes educator referral .                           Follow up in one month is recommended , instructions to bring home blood glucose monitoring     Patient Instructions     - Push fluids.    - Try lozenges or Sucrets for sore throat and Mucinex for coughing.    - Let me know how you are doing in 3-4 days.    [facility]    If you have any questions regarding to your visit please contact your care team:     Team Pink:   Clinic Hours Telephone Number   Internal Medicine:  Dr. Jeanie Decker, NP       7am-7pm  Monday - Thursday   7am-5pm  Fridays  [phone]  (Appointment scheduling available 24/7)    Questions about your visit?  Team Line  [phone]   Urgent Care - [place] - 11am-9pm Monday-Friday Saturday-Sunday- 9am-5pm   [place] - 5pm-9pm Monday-Friday Saturday-Sunday- 9am-5pm  [phone] - [place]   [phone] - [place]       What options do I have for visits at the clinic other than the traditional office visit?  To expand how we care for you, many of our providers are utilizing electronic visits (e-visits) and telephone visits, when medically appropriate, for interactions with their patients rather than a visit in the clinic.   We also offer nurse visits for many medical concerns. Just like any other service, we  will bill your insurance company for this type of visit based on time spent on the phone with your provider. Not all insurance companies cover these visits. Please check with your medical insurance if this type of visit is covered. You will be responsible for any charges that are not paid by your insurance.      E-visits via Sp[employer]Time[employer]:  generally incur a $35.00 fee.  Telephone visits:  Time spent on the phone: *charged based on time that is spent on the phone in increments of 10 minutes. Estimated cost:   5-10 mins $30.00   11-20 mins. $59.00   21-30 mins. $85.00   Use [employer]ost (secure email communication and access to your chart) to send your primary care provider a message or make an appointment. Ask someone on your Team how to sign up for [employer].    For a Price Quote for your services, please call our Consumer Price Line at [phone].    As always, Thank you for trusting us with your health care needs!    Discharged By:  ORLANDO ESCOBEDO      The information in this document, created by the medical scribe for me, accurately reflects the services I personally performed and the decisions made by me. I have reviewed and approved this document for accuracy.   MD Mateus Eason MD  [facility]

## 2017-11-02 NOTE — MR AVS SNAPSHOT
After Visit Summary   11/2/2017    Marina Valdez    MRN: [id number]           Patient Information     Date Of Birth          1950        Visit Information        Provider Department      11/2/2017 11:30 AM Mateus Roberson MD [facility]        Today's Diagnoses     Throat pain    -  1    Eczema, unspecified type        Compulsive skin picking        Impetigo        Chronic bilateral low back pain without sciatica        Uncontrolled type 2 diabetes mellitus with stage 3 chronic kidney disease, with long-term current use of insulin (H)          Care Instructions    - Push fluids.    - Try lozenges or Sucrets for sore throat and Mucinex for coughing.    - Let me know how you are doing in 3-4 days.    [facility]    If you have any questions regarding to your visit please contact your care team:     Team Pink:   Clinic Hours Telephone Number   Internal Medicine:  Dr. Jeanie Decker NP       7am-7pm  Monday - Thursday   7am-5pm  Fridays  [phone]  (Appointment scheduling available 24/7)    Questions about your visit?  Team Line  [phone]   Urgent Care - [place] and [place] - 11am-9pm Monday-Friday Saturday-Sunday- 9am-5pm   [place] - 5pm-9pm Monday-Friday Saturday-Sunday- 9am-5pm  [phone] - [place]   [phone] - [place]       What options do I have for visits at the clinic other than the traditional office visit?  To expand how we care for you, many of our providers are utilizing electronic visits (e-visits) and telephone visits, when medically appropriate, for interactions with their patients rather than a visit in the clinic.   We also offer nurse visits for many medical concerns. Just like any other service, we will bill your insurance company for this type of visit based on time spent on the phone with your provider. Not all insurance companies cover these visits. Please check with your  medical insurance if this type of visit is covered. You will be responsible for any charges that are not paid by your insurance.      E-visits via [employer]:  generally incur a $35.00 fee.  Telephone visits:  Time spent on the phone: *charged based on time that is spent on the phone in increments of 10 minutes. Estimated cost:   5-10 mins $30.00   11-20 mins. $59.00   21-30 mins. $85.00   Use [employer] (secure email communication and access to your chart) to send your primary care provider a message or make an appointment. Ask someone on your Team how to sign up for [employer].    For a Price Quote for your services, please call our [employer] Line at [phone].    As always, Thank you for trusting us with your health care needs!    Discharged By:  ORLANDO ESCOBEDO            Follow-ups after your visit        Your next 10 appointments already scheduled     Nov 22, 2017  1:30 PM CST   LAB with  LAB   [place] [facility] ([facility])    [address]  [place] [id number] [phone]           Please do not eat 10-12 hours before your appointment if you are coming in fasting for labs on lipids, cholesterol, or glucose (sugar). This does not apply to pregnant women. Water, hot tea and black coffee (with nothing added) are okay. Do not drink other fluids, diet soda or chew gum.            Dec 29, 2017 11:00 AM CST   Office Visit with Dyllan Alvares MD   [facility] ([facility])    [address]  [place] [id number] [phone]           Bring a current list of meds and any records pertaining to this visit. For Physicals, please bring immunization records and any forms needing to be filled out. Please arrive 10 minutes early to complete paperwork.              Who to contact     If you have questions or need follow up information about today's clinic visit or your schedule please contact [place] [facility] directly at [phone].  Normal or  "non-critical lab and imaging results will be communicated to you by MyChart, letter or phone within 4 business days after the clinic has received the results. If you do not hear from us within 7 days, please contact the clinic through [employer] or phone. If you have a critical or abnormal lab result, we will notify you by phone as soon as possible.  Submit refill requests through [email or web address] or call your pharmacy and they will forward the refill request to us. Please allow 3 business days for your refill to be completed.          Additional Information About Your Visit        [email or web address] Information     [email or web address] lets you send messages to your doctor, view your test results, renew your prescriptions, schedule appointments and more. To sign up, go to [email or web address] . Click on \"Log in\" on the left side of the screen, which will take you to the Welcome page. Then click on \"Sign up Now\" on the right side of the page.     You will be asked to enter the access code listed below, as well as some personal information. Please follow the directions to create your username and password.     Your access code is: [email or web address]  Expires: 1/15/2018 11:46 AM     Your access code will  in 90 days. If you need help or a new code, please call your [facility] clinic or [phone].        Care EveryWhere ID     This is your Care EveryWhere ID. This could be used by other organizations to access your [facility] medical records  [id number]        Your Vitals Were     Pulse Temperature Last Period Pulse Oximetry Breastfeeding? BMI (Body Mass Index)    131 97.5  F (36.4  C) (Oral) 2010 91% No 55.21 kg/m2       Blood Pressure from Last 3 Encounters:   10/17/17 128/74   17 131/85   07/10/17 124/76    Weight from Last 3 Encounters:   17 (!) 323 lb 6.4 oz (146.7 kg)   10/17/17 (!) 312 lb (141.5 kg)   17 (!) 321 lb (145.6 kg)              We Performed the Following     Beta strep group A culture     Strep, Rapid Screen  "         Today's Medication Changes          These changes are accurate as of: 11/2/17 12:44 PM.  If you have any questions, ask your nurse or doctor.               These medicines have changed or have updated prescriptions.        Dose/Directions    traMADol 50 MG tablet   Commonly known as:  ULTRAM   This may have changed:  See the new instructions.   Used for:  Chronic bilateral low back pain without sciatica   Changed by:  Mateus Roberson MD        TAKE ONE TABLET BY MOUTH TWICE DAILY AS NEEDED FOR MODERATE PAIN   Quantity:  60 tablet   Refills:  0         Stop taking these medicines if you haven't already. Please contact your care team if you have questions.     LORazepam 0.5 MG tablet   Commonly known as:  ATIVAN   Stopped by:  Mateus Roberson MD                Where to get your medicines      These medications were sent to [place] [address] [place] [facility] [address] [place] [address]  [address] [facility] [address]     Phone:  [phone]     hydrocortisone valerate 0.2 % ointment         Some of these will need a paper prescription and others can be bought over the counter.  Ask your nurse if you have questions.     Bring a paper prescription for each of these medications     traMADol 50 MG tablet                Primary Care Provider Office Phone # Fax #    Mateus Roberson [phone] [address] [facility] [address]        Equal Access to Services     MILENA GUILLEN AH: Hadii omari rios hadasho Soomaali, waaxda luqadaha, qaybta kaalmada adeegyada, sanjana ontiveros hayadrien parra. So [facility] [phone].    ATENCIÓN: Si habla español, tiene a garza disposición servicios gratuitos de asistencia lingüística. Llame al [phone].    We comply with applicable federal civil rights laws and [place] laws. We do not discriminate on the basis of race, color, national origin, age, disability, sex, sexual orientation, or gender identity.            Thank you!     Thank you for  choosing [facility]  for your care. Our goal is always to provide you with excellent care. Hearing back from our patients is one way we can continue to improve our services. Please take a few minutes to complete the written survey that you may receive in the mail after your visit with us. Thank you!             Your Updated Medication List - Protect others around you: Learn how to safely use, store and throw away your medicines at www.disposemymeds.org.          This list is accurate as of: 11/2/17 12:44 PM.  Always use your most recent med list.                   Brand Name Dispense Instructions for use Diagnosis    acetaminophen 500 MG tablet    TYLENOL     Take 500 mg by mouth        aspirin 81 MG tablet     100 tablet    Take 1 tablet (81 mg) by mouth daily    Type 2 diabetes, HbA1C goal < 8% (H)       B-D U/F 31G X 8 MM   Generic drug:  insulin pen needle     200 each    USE FOUR TIMES DAILY AS DIRECTED WITH LANTUS AND PREMEAL INSULIN    Uncontrolled type 2 diabetes mellitus with stage 3 chronic kidney disease, with long-term current use of insulin (H)       BENADRYL 25 MG tablet   Generic drug:  diphenhydrAMINE      Take 25 mg by mouth At Bedtime And 25 mg nightly as needed        blood glucose monitoring lancets     300 each    Use to test blood sugars 3 times daily or as directed. Qty of 1 box= 100 lancets    Uncontrolled type 2 diabetes mellitus with stage 3 chronic kidney disease, with long-term current use of insulin (H)       cholecalciferol 1000 UNITS Tabs      Take 1 tablet by mouth daily        GEODON 80 MG capsule   Generic drug:  ziprasidone      Take 160 mg by mouth every morning And 80 mg daily at 10 AM, and 80 mg nightly as needed        HumaLOG KWIKpen 100 UNIT/ML injection   Generic drug:  insulin lispro      Inject 15 Units Subcutaneous 3 times daily (before meals)        hydrocortisone valerate 0.2 % ointment    WEST-JOE    15 g    Apply sparingly to affected area three times  daily for 14 days.    Eczema, unspecified type, Compulsive skin picking, Impetigo       insulin glargine U-300 300 UNIT/ML injection    TOUJEO SOLOSTAR    10 mL    INJECT 96 UNITS UNDER THE SKIN AT BEDTIME    Uncontrolled type 2 diabetes mellitus with stage 3 chronic kidney disease, with long-term current use of insulin (H)       NYSTOP 479833 UNIT/GM Powd   Generic drug:  nystatin     15 g    APPLY TO GROIN AND UNDER BREAST TWICE DAILY UNTIL REDNESS HAS RESOLVED.    Candidal intertrigo       * ONE TOUCH VERIO IQ test strip   Generic drug:  blood glucose monitoring     100 strip    TEST THREE TIMES DAILY OR AS DIRECTED    Uncontrolled type 2 diabetes mellitus with stage 3 chronic kidney disease, with long-term current use of insulin (H)       * blood glucose monitoring test strip    no brand specified    300 strip    Use to test blood sugars 3 times daily or as directed    Uncontrolled type 2 diabetes mellitus with stage 3 chronic kidney disease, with long-term current use of insulin (H)       * order for DME     1 Device    Equipment being ordered: CPAP and associated supplies and tubing    Obstructive sleep apnea       * order for DME     1 Device    Equipment being ordered: CPAP machine set at 15 pressure, heated humidifier, supplies: mask and tubing ( supplies)    Obstructive sleep apnea       * order for DME     1 each    Equipment being ordered: Lift Chair    Morbid obesity due to excess calories (H), Chronic bilateral low back pain without sciatica       * order for DME     1 Device    Equipment being ordered: corset to prevent compulsive skin picking of abdomen    Compulsive skin picking       PARoxetine 40 MG tablet    PAXIL     Take 80 mg by mouth daily. 80mg=2 tablets.        pravastatin 40 MG tablet    PRAVACHOL    90 tablet    Take 1 tablet (40 mg) by mouth daily    Hyperlipidemia LDL goal <100       senna-docusate 8.6-50 MG per tablet    SENOKOT-S;PERICOLACE     Take 2 tablets by mouth 2 times daily as  needed for constipation        traMADol 50 MG tablet    ULTRAM    60 tablet    TAKE ONE TABLET BY MOUTH TWICE DAILY AS NEEDED FOR MODERATE PAIN    Chronic bilateral low back pain without sciatica       valACYclovir 500 MG tablet    VALTREX    90 tablet    TAKE ONE TABLET BY MOUTH ONE TIME DAILY    Recurrent genital HSV (herpes simplex virus) infection       valsartan-hydrochlorothiazide 160-25 MG per tablet    DIOVAN-HCT    90 tablet    Take 1 tablet by mouth daily    Uncontrolled type 2 diabetes mellitus with stage 3 chronic kidney disease, with long-term current use of insulin (H)       * Notice:  This list has 6 medication(s) that are the same as other medications prescribed for you. Read the directions carefully, and ask your doctor or other care provider to review them with you.

## 2017-11-02 NOTE — PATIENT INSTRUCTIONS
- Push fluids.    - Try lozenges or Sucrets for sore throat and Mucinex for coughing.    - Let me know how you are doing in 3-4 days.    [facility]    If you have any questions regarding to your visit please contact your care team:     Team Pink:   Clinic Hours Telephone Number   Internal Medicine:  Dr. Jeanie Decker, NP       7am-7pm  Monday - Thursday   7am-5pm  Fridays  [phone]  (Appointment scheduling available 24/7)    Questions about your visit?  Team Line  [phone]   [facility] [place] [facility] 11am-9pm Monday-Friday Saturday-Sunday- 9am-5pm   [place] - 5pm-9pm Monday-Friday Saturday-Sunday- 9am-5pm  [phone] - [place]   [phone] - [place]       What options do I have for visits at the clinic other than the traditional office visit?  To expand how we care for you, many of our providers are utilizing electronic visits (e-visits) and telephone visits, when medically appropriate, for interactions with their patients rather than a visit in the clinic.   We also offer nurse visits for many medical concerns. Just like any other service, we will bill your insurance company for this type of visit based on time spent on the phone with your provider. Not all insurance companies cover these visits. Please check with your medical insurance if this type of visit is covered. You will be responsible for any charges that are not paid by your insurance.      E-visits via [employer]:  generally incur a $35.00 fee.  Telephone visits:  Time spent on the phone: *charged based on time that is spent on the phone in increments of 10 minutes. Estimated cost:   5-10 mins $30.00   11-20 mins. $59.00   21-30 mins. $85.00   Use [employer] (secure email communication and access to your chart) to send your primary care provider a message or make an appointment. Ask someone on your Team how to sign up for [employer].    For a Price Quote for your  services, please call our Consumer Price Line at [phone].    As always, Thank you for trusting us with your health care needs!    Discharged By:  ORLANDO ESCOBEDO

## 2017-11-02 NOTE — NURSING NOTE
o2 stats sitting heart rate 120 o2 stats  95%    o2 stats when walking heart rate 141  o2 stats  91%      Aleksandra Jaramillo, CMA

## 2017-11-04 LAB
BACTERIA SPEC CULT: NORMAL
SPECIMEN SOURCE: NORMAL

## 2017-11-14 RX ORDER — INSULIN GLARGINE 300 U/ML
INJECTION, SOLUTION SUBCUTANEOUS
Qty: 1.5 ML | Refills: 0 | Status: SHIPPED | OUTPATIENT
Start: 2017-11-14 | End: 2018-01-25

## 2017-11-14 NOTE — TELEPHONE ENCOUNTER
Medication is being filled for 1 time refill only due to:  Patient needs to be seen because needs recheck. MEIR Figueredo RN

## 2017-12-12 ENCOUNTER — CARE COORDINATION (OUTPATIENT)
Dept: GERIATRIC MEDICINE | Facility: CLINIC | Age: 67
End: 2017-12-12

## 2017-12-12 DIAGNOSIS — Z53.9 DIAGNOSIS NOT YET DEFINED: Primary | ICD-10-CM

## 2017-12-12 DIAGNOSIS — Z76.89 HEALTH CARE HOME: ICD-10-CM

## 2017-12-12 PROCEDURE — G0179 MD RECERTIFICATION HHA PT: HCPCS | Performed by: INTERNAL MEDICINE

## 2017-12-13 NOTE — PROGRESS NOTES
Client is new enrollee to [facility] effective 12/1/17 with [facility] health plan. Client new to [facility].  Client has a CADI CM Lyric Cisse from [address], [phone], who is working with client.  No home visit required because this CM has received all required documentations from the CADI CM. Clients annual assessment was 8/8/17. CM called client and introduced self as client s new CM. Confirmed with client that the welcome letter with writer's name and contact information has been received.  Reviewed HRA/LTCC and POC with client. Client reports doing well with no significant change in health condition. She states that her biggest concern is her back pain and that she has had lots of tests to figure out what is wrong. Client reports she is having some family over for [place] but her dtr will be cooking the ham which she is pleased about so she can relax.   Writer reviewed the following with client:  ER visits: No  Hospitalizations: No  TCU stays: No  Significant health status changes: none  Falls/Injuries: No  ADL/IADL changes: No  Changes in services: Yes: new PCA agency will be starting with client, [facility]    Follow-Up Plan: Client informed of future contact, plan to f/u with client with a 6 month telephone assessment.  Contact information shared with member and family, encouraged client to call with any questions or concerns.  CM asked CMS to mail her list of dental providers close to her per client request.     GAVIOTA Blackwell   [facility]   [phone]

## 2018-01-02 ENCOUNTER — TELEPHONE (OUTPATIENT)
Dept: INTERNAL MEDICINE | Facility: CLINIC | Age: 68
End: 2018-01-02

## 2018-01-02 DIAGNOSIS — G47.33 OBSTRUCTIVE SLEEP APNEA: ICD-10-CM

## 2018-01-02 NOTE — TELEPHONE ENCOUNTER
Reason for call:  Other   Patient called regarding (reason for call): call back  Additional comments: Please fax Rx for CPAP supplies to [employer] at  Fax [phone].      Phone number to reach patient:  Home number on file [phone] (home)    Best Time:  ASAP    Can we leave a detailed message on this number?  YES

## 2018-01-03 NOTE — TELEPHONE ENCOUNTER
DME order chris'd up as previously ordered in 2014.  Please review, alter if needed, and sign if appropriate    Harshal Hayward RN

## 2018-01-11 ENCOUNTER — TELEPHONE (OUTPATIENT)
Dept: INTERNAL MEDICINE | Facility: CLINIC | Age: 68
End: 2018-01-11

## 2018-01-12 NOTE — TELEPHONE ENCOUNTER
Per Dr. Roberson: benedryl is not contraindicated in kidney disease.    Spoke with patient.  She takes 2 benedryl at HS for sleep.  Updated her that it is not contraindicated in kidney disease.  Patient verbalized understanding.  Harshal Hayward RN

## 2018-01-16 ENCOUNTER — TELEPHONE (OUTPATIENT)
Dept: INTERNAL MEDICINE | Facility: CLINIC | Age: 68
End: 2018-01-16

## 2018-01-16 NOTE — TELEPHONE ENCOUNTER
Panel Management Review      Patient has the following on her problem list:     Diabetes    ASA: Passed    Last A1C  Lab Results   Component Value Date    A1C 10.4 10/17/2017    A1C 8.4 07/10/2017    A1C 7.1 04/18/2017    A1C 9.4 11/11/2016    A1C 10.2 06/14/2016     A1C tested: FAILED    Last LDL:    Lab Results   Component Value Date    CHOL 122 10/17/2017     Lab Results   Component Value Date    HDL 71 10/17/2017     Lab Results   Component Value Date    LDL 29 10/17/2017     Lab Results   Component Value Date    TRIG 109 10/17/2017     Lab Results   Component Value Date    CHOLHDLRATIO 2.2 07/09/2015     Lab Results   Component Value Date    NHDL 51 10/17/2017       Is the patient on a Statin? YES             Is the patient on Aspirin? YES    Medications     HMG CoA Reductase Inhibitors    pravastatin (PRAVACHOL) 40 MG tablet    Salicylates    aspirin 81 MG tablet          Last three blood pressure readings:  BP Readings from Last 3 Encounters:   10/17/17 128/74   07/26/17 131/85   07/10/17 124/76       Date of last diabetes office visit: 10/17/17     Tobacco History:     History   Smoking Status     Former Smoker     Packs/day: 1.00     Years: 0.00     Types: Cigarettes     Quit date: 1/1/1990   Smokeless Tobacco     Never Used     Comment: quit 1990             Composite cancer screening  Chart review shows that this patient is due/due soon for the following None  Summary:    Patient is due/failing the following:   A1C    Action needed:   Patient needs office visit for DM check/A1C.    Type of outreach:    Sent letter.    Questions for provider review:    None                                                                                                                                    Emely Bernal CMA     Chart routed to  .

## 2018-01-16 NOTE — LETTER
[facility]  [address] [id number] [phone]        January 16, 2018          Marina Nguyenjc,  [address]    [place] [address]        Dear LorieSharron Valdez,      Monitoring and managing your preventative and chronic health conditions are very important to us. Our records indicate that you have not scheduled for a Diabetic Check and HgbA1C recheck which was recommended by Dr. Roberson.      If you have received your health care elsewhere, please call the clinic so the information can be documented in your chart.    Please call [phone] or message us through your [employer] account to schedule an appointment or provide information for your chart.     Feel free to contact us if you have any questions or concerns!    I look forward to seeing you and working with you on your health care needs.     Sincerely,         Mateus Roberson / Emely Bernal, CMA

## 2018-01-18 ENCOUNTER — TELEPHONE (OUTPATIENT)
Dept: INTERNAL MEDICINE | Facility: CLINIC | Age: 68
End: 2018-01-18

## 2018-01-18 NOTE — TELEPHONE ENCOUNTER
Reason for Call:  Other call back    Detailed comments:  Patient calling. She had the nurse take her blood pressure. It's been running low. No numbers. Please call and advise.     Phone Number Patient can be reached at: Home number on file [phone] (home)    Best Time:  any    Can we leave a detailed message on this number? YES    Call taken on 1/18/2018 at 10:10 AM by Daisy Ramos

## 2018-01-18 NOTE — TELEPHONE ENCOUNTER
Spoke with patient.  She stated that she has a nurse that comes to see her once a week.  Patient does not recall what her BP was but stated that she went to her psych appointment yesterday and had them check her BP and it was good.  She was unable to give exact numbers.  Denies symptoms  Advised her to check with her nurse and ask her nurse to call the clinic at the next visit if BP is abnormal again    Patient verbalized understanding.  Harshal Hayward RN

## 2018-01-25 ENCOUNTER — RADIANT APPOINTMENT (OUTPATIENT)
Dept: GENERAL RADIOLOGY | Facility: CLINIC | Age: 68
End: 2018-01-25
Attending: INTERNAL MEDICINE
Payer: COMMERCIAL

## 2018-01-25 ENCOUNTER — OFFICE VISIT (OUTPATIENT)
Dept: INTERNAL MEDICINE | Facility: CLINIC | Age: 68
End: 2018-01-25
Payer: COMMERCIAL

## 2018-01-25 ENCOUNTER — TELEPHONE (OUTPATIENT)
Dept: FAMILY MEDICINE | Facility: CLINIC | Age: 68
End: 2018-01-25

## 2018-01-25 VITALS
HEART RATE: 120 BPM | OXYGEN SATURATION: 96 % | DIASTOLIC BLOOD PRESSURE: 72 MMHG | SYSTOLIC BLOOD PRESSURE: 132 MMHG | HEIGHT: 64 IN | RESPIRATION RATE: 15 BRPM | WEIGHT: 293 LBS | TEMPERATURE: 98.3 F | BODY MASS INDEX: 50.02 KG/M2

## 2018-01-25 DIAGNOSIS — E66.01 MORBID OBESITY DUE TO EXCESS CALORIES (H): ICD-10-CM

## 2018-01-25 DIAGNOSIS — B37.2 CANDIDAL INTERTRIGO: ICD-10-CM

## 2018-01-25 DIAGNOSIS — M53.3 SACRO-ILIAC PAIN: ICD-10-CM

## 2018-01-25 DIAGNOSIS — G89.29 CHRONIC BILATERAL LOW BACK PAIN WITHOUT SCIATICA: ICD-10-CM

## 2018-01-25 DIAGNOSIS — C54.1 ENDOMETRIAL CANCER, GRADE I (H): ICD-10-CM

## 2018-01-25 DIAGNOSIS — I10 HYPERTENSION GOAL BP (BLOOD PRESSURE) < 140/90: Primary | ICD-10-CM

## 2018-01-25 DIAGNOSIS — G47.33 OBSTRUCTIVE SLEEP APNEA: ICD-10-CM

## 2018-01-25 DIAGNOSIS — F20.9 SCHIZOPHRENIA, UNSPECIFIED TYPE (H): ICD-10-CM

## 2018-01-25 DIAGNOSIS — A60.00 RECURRENT GENITAL HSV (HERPES SIMPLEX VIRUS) INFECTION: ICD-10-CM

## 2018-01-25 DIAGNOSIS — M54.50 CHRONIC BILATERAL LOW BACK PAIN WITHOUT SCIATICA: ICD-10-CM

## 2018-01-25 DIAGNOSIS — R30.0 DYSURIA: ICD-10-CM

## 2018-01-25 LAB
ALBUMIN UR-MCNC: NEGATIVE MG/DL
ANION GAP SERPL CALCULATED.3IONS-SCNC: 11 MMOL/L (ref 3–14)
APPEARANCE UR: CLEAR
BILIRUB UR QL STRIP: NEGATIVE
BUN SERPL-MCNC: 19 MG/DL (ref 7–30)
CALCIUM SERPL-MCNC: 9.1 MG/DL (ref 8.5–10.1)
CHLORIDE SERPL-SCNC: 97 MMOL/L (ref 94–109)
CO2 SERPL-SCNC: 26 MMOL/L (ref 20–32)
COLOR UR AUTO: YELLOW
CREAT SERPL-MCNC: 1.2 MG/DL (ref 0.52–1.04)
GFR SERPL CREATININE-BSD FRML MDRD: 45 ML/MIN/1.7M2
GLUCOSE SERPL-MCNC: 511 MG/DL (ref 70–99)
GLUCOSE UR STRIP-MCNC: >=1000 MG/DL
HGB UR QL STRIP: ABNORMAL
KETONES UR STRIP-MCNC: NEGATIVE MG/DL
LEUKOCYTE ESTERASE UR QL STRIP: NEGATIVE
NITRATE UR QL: NEGATIVE
PH UR STRIP: 6 PH (ref 5–7)
POTASSIUM SERPL-SCNC: 3.7 MMOL/L (ref 3.4–5.3)
RBC #/AREA URNS AUTO: ABNORMAL /HPF
SODIUM SERPL-SCNC: 134 MMOL/L (ref 133–144)
SOURCE: ABNORMAL
SP GR UR STRIP: <=1.005 (ref 1–1.03)
UROBILINOGEN UR STRIP-ACNC: 0.2 EU/DL (ref 0.2–1)
WBC #/AREA URNS AUTO: ABNORMAL /HPF

## 2018-01-25 PROCEDURE — 36415 COLL VENOUS BLD VENIPUNCTURE: CPT | Performed by: INTERNAL MEDICINE

## 2018-01-25 PROCEDURE — 99214 OFFICE O/P EST MOD 30 MIN: CPT | Performed by: INTERNAL MEDICINE

## 2018-01-25 PROCEDURE — 80048 BASIC METABOLIC PNL TOTAL CA: CPT | Performed by: INTERNAL MEDICINE

## 2018-01-25 PROCEDURE — 72170 X-RAY EXAM OF PELVIS: CPT

## 2018-01-25 PROCEDURE — 81001 URINALYSIS AUTO W/SCOPE: CPT | Performed by: INTERNAL MEDICINE

## 2018-01-25 RX ORDER — TRAMADOL HYDROCHLORIDE 50 MG/1
TABLET ORAL
Qty: 60 TABLET | Refills: 0 | Status: SHIPPED | OUTPATIENT
Start: 2018-01-25 | End: 2018-04-30

## 2018-01-25 RX ORDER — NYSTATIN 100000 [USP'U]/G
POWDER TOPICAL
Qty: 15 G | Refills: 1 | Status: SHIPPED | OUTPATIENT
Start: 2018-01-25 | End: 2018-03-20

## 2018-01-25 RX ORDER — VALACYCLOVIR HYDROCHLORIDE 500 MG/1
500 TABLET, FILM COATED ORAL DAILY
Qty: 90 TABLET | Refills: 1 | Status: SHIPPED | OUTPATIENT
Start: 2018-01-25 | End: 2018-08-28

## 2018-01-25 RX ORDER — VALSARTAN AND HYDROCHLOROTHIAZIDE 160; 25 MG/1; MG/1
1 TABLET ORAL DAILY
Qty: 90 TABLET | Refills: 1 | Status: SHIPPED | OUTPATIENT
Start: 2018-01-25 | End: 2018-10-07

## 2018-01-25 NOTE — PROGRESS NOTES
SUBJECTIVE:   Marina Valdez is a 67 year old female who presents to clinic today for the following health issues:    She is accompanied by her brother who assists in providing history.      Hypertension -- She takes Diovan-/25 mg once daily. Patient relates her blood pressure have been low at home when checked by her home nurse once weekly. Her nurse recommended she follow up with PCP regarding this. She does not recall what her blood pressure readings were. Her blood pressure was checked at psychiatrist office and it was within normal limits. Her blood pressure is excellent today . She endorses occasional dizziness with positional changes over the last month. She mentions exertional dyspnea requiring rest after short distances. There have been no recent changes of blood pressure medication. Her weight has been stable. No acute illness symptoms recently. Patient requests DME order for blood pressure cuff so she is able to check her own blood pressures. Patient is not in especially good health with advancing age, chronically out of control diabetes mellitus , Body mass index is 55.32 kg/(m^2). and her schizophrenia creates management difficulties to say the least !    BP Readings from Last 3 Encounters:   01/25/18 132/72   10/17/17 128/74   07/26/17 131/85     Back pain -- patient reports severe right sided buttocks pain for 1 year, with intermittent possibly radicular pain to right leg. The lower back pain has worsened in the last week. She describes the pain as sharp in character. Leg pain is intermittent and is aggravated when in seated position. She takes tramadol without relief in pain. She has attended physical therapy.      Sleep apnea -- patient requests DME order for CPAP supplies.     Diabetes -- uncontrolled; it has been difficult to implement diabetic management with insulin etc. Her brother notes they check their blood sugars together to ensure she is checking it.  Lab Results   Component  Value Date    A1C 10.4 10/17/2017    A1C 8.4 07/10/2017    A1C 7.1 04/18/2017    A1C 9.4 11/11/2016    A1C 10.2 06/14/2016     Hyperlipidemia --      Recent Labs   Lab Test  10/17/17   1151  07/10/17   1340  06/14/16   1338  07/09/15   1228   10/15/13   1022   CHOL  122   --   129  139   --   148   HDL  71   --   69  63   --   55   LDL  29  49  42  55   < >  68   TRIG  109   --   90  103   --   125   CHOLHDLRATIO   --    --    --   2.2   --   2.7    < > = values in this interval not displayed.       Patient is suspicious for UTI and request UA today.      Problem list and histories reviewed & adjusted, as indicated.  Additional history: as documented    Patient Active Problem List   Diagnosis     Hypertension goal BP (blood pressure) < 140/90     Recurrent genital HSV (herpes simplex virus) infection     Intertrigo     Ovarian Mass s/p excision in 2008     Sebaceous cyst     Microalbuminuria     Hyperlipidemia with target LDL less than 100     Chest pain at rest x1 episode- resolved at rest 3 hours, assoc with food     Obstructive sleep apnea (on cpap)     24 hour contact given to patient      Hydradenitis     CKD (chronic kidney disease) stage 3, GFR 30-59 ml/min     Compulsive skin picking     Need for prophylactic vaccination and inoculation against influenza     High risk OTC medication or supplement use     Rotator cuff tear     Morbid obesity due to excess calories (H)     Chronic bilateral low back pain without sciatica     Primary osteoarthritis of both hips     Schizophrenia, unspecified type (H)     Uncontrolled type 2 diabetes mellitus with stage 3 chronic kidney disease, with long-term current use of insulin (H)     Endometrial cancer, grade I (H)     Impetigo     Health Care Home     Past Surgical History:   Procedure Laterality Date     cataract      bilateral     CYSTOSCOPY N/A 12/29/2016    Procedure: CYSTOSCOPY;  Surgeon: Franca Rousseau MD;  Location: [facility] OR     DAVINCI HYSTERECTOMY TOTAL,  BILATERAL SALPINGO-OOPHORECTOMY, COMBINED N/A 12/29/2016    Procedure: COMBINED DAVINCI HYSTERECTOMY TOTAL, SALPINGO-OOPHORECTOMY;  Surgeon: Franca Rousseau MD;  Location: [facility] OR     LAPAROSCOPY      for endometriosis     OOPHORECTOMY      right     WRIST SURGERY      right       Social History   Substance Use Topics     Smoking status: Former Smoker     Packs/day: 1.00     Years: 0.00     Types: Cigarettes     Quit date: 1/1/1990     Smokeless tobacco: Never Used      Comment: quit 1990     Alcohol use No     Family History   Problem Relation Age of Onset     HEART DISEASE Mother      DIABETES Mother      Hypertension Mother      Psychotic Disorder Mother      schitzophrenia     Hypertension Father      Blood Disease Father      high iron level     HEART DISEASE Maternal Grandmother      CEREBROVASCULAR DISEASE Maternal Grandmother      HEART DISEASE Maternal Grandfather      Breast Cancer Paternal Grandmother      Breast Cancer Sister      HEART DISEASE Brother      Ovarian Cancer Paternal Aunt      Kidney Cancer Daughter      Uterine Cancer Daughter      Uterine Cancer Sister          Current Outpatient Prescriptions   Medication Sig Dispense Refill     valsartan-hydrochlorothiazide (DIOVAN-HCT) 160-25 MG per tablet Take 1 tablet by mouth daily 90 tablet 1     insulin glargine U-300 (TOUJEO SOLOSTAR) 300 UNIT/ML injection INJECT 96 UNITS UNDER THE SKIN AT BEDTIME 29 mL 1     insulin glargine U-300 (TOUJEO SOLOSTAR) 300 UNIT/ML injection INJECT 100 UNITS UNDER THE SKIN AT BEDTIME 1.5 mL 0     traMADol (ULTRAM) 50 MG tablet TAKE ONE TABLET BY MOUTH TWICE DAILY AS NEEDED FOR MODERATE PAIN 60 tablet 0     valACYclovir (VALTREX) 500 MG tablet Take 1 tablet (500 mg) by mouth daily 90 tablet 1     insulin lispro (HUMALOG KWIKPEN) 100 UNIT/ML injection Inject 15 Units Subcutaneous 3 times daily (before meals) 3 mL 3     nystatin (NYSTOP) 572197 UNIT/GM POWD APPLY TO GROIN AND UNDER BREAST TWICE DAILY UNTIL  REDNESS HAS RESOLVED. 15 g 1     order for DME Equipment being ordered: CPAP 1 Device 0     order for DME Equipment being ordered: home blood pressure cuff for home blood pressure monitoring 1 Device 0     blood glucose monitoring (ONETOUCH VERIO IQ) test strip TEST THREE TIMES DAILY OR AS DIRECTED 100 strip 3     order for DME Equipment being ordered: CPAP machine set at 15 pressure, heated humidifier, supplies: mask and tubing ( supplies) 1 Device 1     hydrocortisone valerate (WEST-JOE) 0.2 % ointment Apply sparingly to affected area three times daily for 14 days. 15 g 0     blood glucose monitoring (ONE TOUCH DELICA) lancets Use to test blood sugars 3 times daily or as directed. Qty of 1 box= 100 lancets 300 each 3     pravastatin (PRAVACHOL) 40 MG tablet Take 1 tablet (40 mg) by mouth daily 90 tablet 3     blood glucose monitoring (NO BRAND SPECIFIED) test strip Use to test blood sugars 3 times daily or as directed 300 strip 3     B-D U/F 31G X 8 MM insulin pen needle USE FOUR TIMES DAILY AS DIRECTED WITH LANTUS AND PREMEAL INSULIN 200 each 3     acetaminophen (TYLENOL) 500 MG tablet Take 500 mg by mouth       diphenhydrAMINE (BENADRYL) 25 MG tablet Take 25 mg by mouth At Bedtime And 25 mg nightly as needed       cholecalciferol 1000 UNITS TABS Take 1 tablet by mouth daily       ziprasidone (GEODON) 80 MG capsule Take 160 mg by mouth every morning And 80 mg daily at 10 AM, and 80 mg nightly as needed       senna-docusate (SENOKOT-S;PERICOLACE) 8.6-50 MG per tablet Take 2 tablets by mouth 2 times daily as needed for constipation       order for DME Equipment being ordered: corset to prevent compulsive skin picking of abdomen 1 Device 0     order for DME Equipment being ordered: Lift Chair 1 each 0     aspirin 81 MG tablet Take 1 tablet (81 mg) by mouth daily 100 tablet 3     ORDER FOR DME Equipment being ordered: CPAP and associated supplies and tubing 1 Device 0     PAROXETINE HCL 40 MG OR TABS Take 80 mg by  "mouth daily. 80mg=2 tablets.        [DISCONTINUED] TOUJEO SOLOSTAR 300 UNIT/ML injection INJECT 96 UNITS UNDER THE SKIN AT BEDTIME 29 mL 1     [DISCONTINUED] TOUJEO SOLOSTAR 300 UNIT/ML injection INJECT 100 UNITS UNDER THE SKIN AT BEDTIME 1.5 mL 0     [DISCONTINUED] valACYclovir (VALTREX) 500 MG tablet TAKE ONE TABLET BY MOUTH ONE TIME DAILY 90 tablet 1     [DISCONTINUED] valsartan-hydrochlorothiazide (DIOVAN-HCT) 160-25 MG per tablet Take 1 tablet by mouth daily 90 tablet 1     [DISCONTINUED] insulin lispro (HUMALOG KWIKPEN) 100 UNIT/ML injection Inject 15 Units Subcutaneous 3 times daily (before meals)       Labs reviewed in EPIC    Reviewed and updated as needed this visit by clinical staff  Tobacco  Allergies  Meds  Med Hx  Surg Hx  Fam Hx  Soc Hx      Reviewed and updated as needed this visit by Provider         ROS:  Constitutional, HEENT, cardiovascular, pulmonary, GI, , musculoskeletal, neuro, skin, endocrine and psych systems are negative, except as otherwise noted.    This document serves as a record of the services and decisions personally performed and made by Mateus Roberson MD. It was created on his behalf by Shira Sotelo, a trained medical scribe. The creation of this document is based on the provider's statements to the medical scribe.  Shira Sotelo January 25, 2018 2:04 PM       OBJECTIVE:     /72 (BP Location: Left arm, Patient Position: Chair, Cuff Size: Adult Regular)  Pulse 120  Temp 98.3  F (36.8  C) (Oral)  Resp 15  Ht 1.63 m (5' 4.17\")  Wt (!) 147 kg (324 lb)  LMP 03/24/2010  SpO2 96%  Breastfeeding? No  BMI 55.32 kg/m2  Body mass index is 55.32 kg/(m^2).  GENERAL: healthy, alert and no distress, talkative and appropriate   EYES: Eyes grossly normal to inspection, PERRL and conjunctivae and sclerae normal  MS: A lot of right sacroiliac joint tenderness to palpation, no right greater trochanteric tenderness, negative straight leg raising, intact hip ROM, no " gross musculoskeletal defects noted, no edema  SKIN: no suspicious lesions or rashes   NEURO: Normal strength and tone, mentation intact and speech normal  PSYCH: mentation appears normal, affect normal/bright    Diagnostic Test Results:  Results for orders placed or performed in visit on 01/25/18 (from the past 24 hour(s))   UA with Microscopic reflex to Culture   Result Value Ref Range    Color Urine Yellow     Appearance Urine Clear     Glucose Urine >=1000 (A) NEG^Negative mg/dL    Bilirubin Urine Negative NEG^Negative    Ketones Urine Negative NEG^Negative mg/dL    Specific Gravity Urine <=1.005 1.003 - 1.035    pH Urine 6.0 5.0 - 7.0 pH    Protein Albumin Urine Negative NEG^Negative mg/dL    Urobilinogen Urine 0.2 0.2 - 1.0 EU/dL    Nitrite Urine Negative NEG^Negative    Blood Urine Small (A) NEG^Negative    Leukocyte Esterase Urine Negative NEG^Negative    Source Midstream Urine     WBC Urine O - 2 OTO2^O - 2 /HPF    RBC Urine 2-5 (A) OTO2^O - 2 /HPF       ASSESSMENT/PLAN:   (I10) Hypertension goal BP (blood pressure) < 140/90  (primary encounter diagnosis)  Comment: Blood pressure controlled. Continue current medications. No evidence for hypotension today. Recommended patient record her blood pressure readings in the future.   Plan: order for DME for blood pressure cuff          (E11.22,  E11.65,  N18.3,  Z79.4) Uncontrolled type 2 diabetes mellitus with stage 3 chronic kidney disease, with long-term current use of insulin (H)  Comment: will suggest going back to the certified diabetes educator but it frankly seems fruitless at this point ! Possibly this patient would benefit from a (glucagon-like peptide-1) GLP-1 agonist !  Plan: BASIC METABOLIC PANEL,         valsartan-hydrochlorothiazide (DIOVAN-HCT)         160-25 MG per tablet, insulin glargine U-300         (TOUJEO SOLOSTAR) 300 UNIT/ML injection,         insulin glargine U-300 (TOUJEO SOLOSTAR) 300         UNIT/ML injection, insulin lispro (HUMALOG          KWIKPEN) 100 UNIT/ML injection           (M54.5,  G89.29) Chronic bilateral low back pain without sciatica  Comment: ongoing lower back pain. I agree to refer patient for an [facility]   Plan: traMADol (ULTRAM) 50 MG tablet        Refill provided    (A60.00) Recurrent genital HSV (herpes simplex virus) infection  Comment: controlled. refill provided  Plan: valACYclovir (VALTREX) 500 MG tablet          (B37.2) Candidal intertrigo  Comment: controlled. refill provided  Plan: nystatin (NYSTOP) 907928 UNIT/GM POWD          (G47.33) Obstructive sleep apnea (on cpap)  Comment: stable. She requests DME for CPAP supplies.   Plan: order for DME for CPAP supplies          (M53.3) Sacro-iliac pain  Comment: significant right SI joint tenderness to palpation on exam. Pelvic XR shows    Plan: XR Pelvis 1/2 Views        Consider SI joint injections     (R30.0) Dysuria  Comment: UA will be followed up on.   Plan: UA with Microscopic reflex to Culture         The information in this document, created by the medical scribe for me, accurately reflects the services I personally performed and the decisions made by me. I have reviewed and approved this document for accuracy prior to leaving the patient care area.  January 25, 2018 3:24 PM    Mateus Roberson MD  [facility]

## 2018-01-25 NOTE — MR AVS SNAPSHOT
After Visit Summary   1/25/2018    Marina Valdez    MRN: [id number]           Patient Information     Date Of Birth          1950        Visit Information        Provider Department      1/25/2018 2:10 PM Mateus Roberson MD [facility]        Today's Diagnoses     Hypertension goal BP (blood pressure) < 140/90    -  1    Uncontrolled type 2 diabetes mellitus with stage 3 chronic kidney disease, with long-term current use of insulin (H)        Chronic bilateral low back pain without sciatica        Recurrent genital HSV (herpes simplex virus) infection        Candidal intertrigo        Obstructive sleep apnea (on cpap)        Sacro-iliac pain        Dysuria          Care Instructions    Blood pressure seems to be under good control from your blood pressure in clinic today. Your blood pressure today was 120/70. Record future low blood pressures and bring to next appointment.     X-ray today.     [facility]    If you have any questions regarding to your visit please contact your care team:       Team Red:   Clinic Hours Telephone Number   Dr. Crys Castillo NP   7am-7pm  Monday - Thursday   7am-5pm  Fridays  [phone]  (Appointment scheduling available 24/7)    Questions about your visit?   Team Line  [phone]   Urgent Care - [place] - 11am-9pm Monday-Friday Saturday-Sunday- 9am-5pm   [place] - 5pm-9pm Monday-Friday Saturday-Sunday- 9am-5pm  [phone] - [place]   [phone] - [place]       What options do I have for visits at the clinic other than the traditional office visit?  To expand how we care for you, many of our providers are utilizing electronic visits (e-visits) and telephone visits, when medically appropriate, for interactions with their patients rather than a visit in the clinic.   We also offer nurse visits for many medical concerns. Just like any other  service, we will bill your insurance company for this type of visit based on time spent on the phone with your provider. Not all insurance companies cover these visits. Please check with your medical insurance if this type of visit is covered. You will be responsible for any charges that are not paid by your insurance.      E-visits via b3 biohart:  generally incur a $35.00 fee.  Telephone visits:  Time spent on the phone: *charged based on time that is spent on the phone in increments of 10 minutes. Estimated cost:   5-10 mins $30.00   11-20 mins. $59.00   21-30 mins. $85.00     Use [employer] (secure email communication and access to your chart) to send your primary care provider a message or make an appointment. Ask someone on your Team how to sign up for [employer].  For a Price Quote for your services, please call our [employer] Line at [phone].      As always, Thank you for trusting us with your health care needs!  Gray Duvall            Follow-ups after your visit        Future tests that were ordered for you today     Open Future Orders        Priority Expected Expires Ordered    XR Pelvis 1/2 Views Routine 1/25/2018 1/25/2019 1/25/2018            Who to contact     If you have questions or need follow up information about today's clinic visit or your schedule please contact [facility] directly at [phone].  Normal or non-critical lab and imaging results will be communicated to you by b3 biohart, letter or phone within 4 business days after the clinic has received the results. If you do not hear from us within 7 days, please contact the clinic through MyChart or phone. If you have a critical or abnormal lab result, we will notify you by phone as soon as possible.  Submit refill requests through [employer] or call your pharmacy and they will forward the refill request to us. Please allow 3 business days for your refill to be completed.          Additional Information About Your Visit       "  MyChart Information     [employer] lets you send messages to your doctor, view your test results, renew your prescriptions, schedule appointments and more. To sign up, go to [email or web address] . Click on \"Log in\" on the left side of the screen, which will take you to the Welcome page. Then click on \"Sign up Now\" on the right side of the page.     You will be asked to enter the access code listed below, as well as some personal information. Please follow the directions to create your username and password.     Your access code is: [email or web address]  Expires: 2018  3:01 PM     Your access code will  in 90 days. If you need help or a new code, please call your [facility] clinic or [phone].        Care EveryWhere ID     This is your Care EveryWhere ID. This could be used by other organizations to access your [facility] medical records  [id number]        Your Vitals Were     Pulse Temperature Respirations Height Last Period Pulse Oximetry    120 98.3  F (36.8  C) (Oral) 15 5' 4.17\" (1.63 m) 2010 96%    Breastfeeding? BMI (Body Mass Index)                No 55.32 kg/m2           Blood Pressure from Last 3 Encounters:   18 132/72   10/17/17 128/74   17 131/85    Weight from Last 3 Encounters:   18 (!) 324 lb (147 kg)   17 (!) 323 lb 6.4 oz (146.7 kg)   10/17/17 (!) 312 lb (141.5 kg)              We Performed the Following     BASIC METABOLIC PANEL     UA with Microscopic reflex to Culture          Today's Medication Changes          These changes are accurate as of 18  3:01 PM.  If you have any questions, ask your nurse or doctor.               These medicines have changed or have updated prescriptions.        Dose/Directions    * insulin glargine U-300 300 UNIT/ML injection   Commonly known as:  TOUJEO SOLOSTAR   This may have changed:  See the new instructions.   Used for:  Uncontrolled type 2 diabetes mellitus with stage 3 chronic kidney disease, with long-term current use " of insulin (H)   Changed by:  Mateus Roberson MD        INJECT 96 UNITS UNDER THE SKIN AT BEDTIME   Quantity:  29 mL   Refills:  1       * insulin glargine U-300 300 UNIT/ML injection   Commonly known as:  TOVINITA FONTENOT   This may have changed:  See the new instructions.   Used for:  Uncontrolled type 2 diabetes mellitus with stage 3 chronic kidney disease, with long-term current use of insulin (H)   Changed by:  Mateus Roberson MD        INJECT 100 UNITS UNDER THE SKIN AT BEDTIME   Quantity:  1.5 mL   Refills:  0       nystatin 413730 UNIT/GM Powd   Commonly known as:  NYSTOP   This may have changed:  See the new instructions.   Used for:  Candidal intertrigo   Changed by:  Mateus Roberson MD        APPLY TO GROIN AND UNDER BREAST TWICE DAILY UNTIL REDNESS HAS RESOLVED.   Quantity:  15 g   Refills:  1       * order for DME   This may have changed:  Another medication with the same name was added. Make sure you understand how and when to take each.   Used for:  Morbid obesity due to excess calories (H), Chronic bilateral low back pain without sciatica   Changed by:  Mateus Roberson MD        Equipment being ordered: Lift Chair   Quantity:  1 each   Refills:  0       * order for DME   This may have changed:  Another medication with the same name was added. Make sure you understand how and when to take each.   Used for:  Compulsive skin picking   Changed by:  Mateus Roberson MD        Equipment being ordered: corset to prevent compulsive skin picking of abdomen   Quantity:  1 Device   Refills:  0       * order for DME   This may have changed:  Another medication with the same name was added. Make sure you understand how and when to take each.   Used for:  Obstructive sleep apnea   Changed by:  Mateus Roberson MD        Equipment being ordered: CPAP machine set at 15 pressure, heated humidifier, supplies: mask and tubing ( supplies)   Quantity:  1 Device   Refills:  1       * order for DME   This may have changed:  You  were already taking a medication with the same name, and this prescription was added. Make sure you understand how and when to take each.   Used for:  Obstructive sleep apnea   Changed by:  Mateus Roberson MD        Equipment being ordered: CPAP   Quantity:  1 Device   Refills:  0       * order for DME   This may have changed:  You were already taking a medication with the same name, and this prescription was added. Make sure you understand how and when to take each.   Used for:  Hypertension goal BP (blood pressure) < 140/90   Changed by:  Mateus Roberson MD        Equipment being ordered: home blood pressure cuff for home blood pressure monitoring   Quantity:  1 Device   Refills:  0       valACYclovir 500 MG tablet   Commonly known as:  VALTREX   This may have changed:  See the new instructions.   Used for:  Recurrent genital HSV (herpes simplex virus) infection   Changed by:  Mateus Roberson MD        Dose:  500 mg   Take 1 tablet (500 mg) by mouth daily   Quantity:  90 tablet   Refills:  1       * Notice:  This list has 7 medication(s) that are the same as other medications prescribed for you. Read the directions carefully, and ask your doctor or other care provider to review them with you.         Where to get your medicines      These medications were sent to [place] [id number] [facility] - [place], [place] - [address]  [address] [address]     Phone:  [phone]     insulin glargine U-300 300 UNIT/ML injection    insulin glargine U-300 300 UNIT/ML injection    insulin lispro 100 UNIT/ML injection    nystatin 523059 UNIT/GM Powd    valACYclovir 500 MG tablet    valsartan-hydrochlorothiazide 160-25 MG per tablet         Some of these will need a paper prescription and others can be bought over the counter.  Ask your nurse if you have questions.     Bring a paper prescription for each of these medications     order for DME    order for DME    traMADol 50 MG tablet                Primary  Care Provider Office Phone # Fax #    Mateus Roberson [phone] [address] [facility] [address]        Equal Access to Services     MILENA AUGUSTYOLANDA : Hadii aad ku haddodieo Soomaali, waaxda luqadaha, qaybta kaalmada adeegyada, sanjana adamstierra aida. So [facility] [phone].    ATENCIÓN: Si habla español, tiene a garza disposición servicios gratuitos de asistencia lingüística. Llame al [phone].    We comply with applicable federal civil rights laws and [place] laws. We do not discriminate on the basis of race, color, national origin, age, disability, sex, sexual orientation, or gender identity.            Thank you!     Thank you for choosing [facility]  for your care. Our goal is always to provide you with excellent care. Hearing back from our patients is one way we can continue to improve our services. Please take a few minutes to complete the written survey that you may receive in the mail after your visit with us. Thank you!             Your Updated Medication List - Protect others around you: Learn how to safely use, store and throw away your medicines at www.disposemymeds.org.          This list is accurate as of 1/25/18  3:01 PM.  Always use your most recent med list.                   Brand Name Dispense Instructions for use Diagnosis    acetaminophen 500 MG tablet    TYLENOL     Take 500 mg by mouth        aspirin 81 MG tablet     100 tablet    Take 1 tablet (81 mg) by mouth daily    Type 2 diabetes, HbA1C goal < 8% (H)       B-D U/F 31G X 8 MM   Generic drug:  insulin pen needle     200 each    USE FOUR TIMES DAILY AS DIRECTED WITH LANTUS AND PREMEAL INSULIN    Uncontrolled type 2 diabetes mellitus with stage 3 chronic kidney disease, with long-term current use of insulin (H)       BENADRYL 25 MG tablet   Generic drug:  diphenhydrAMINE      Take 25 mg by mouth At Bedtime And 25 mg nightly as needed        blood glucose monitoring lancets     300  each    Use to test blood sugars 3 times daily or as directed. Qty of 1 box= 100 lancets    Uncontrolled type 2 diabetes mellitus with stage 3 chronic kidney disease, with long-term current use of insulin (H)       * blood glucose monitoring test strip    no brand specified    300 strip    Use to test blood sugars 3 times daily or as directed    Uncontrolled type 2 diabetes mellitus with stage 3 chronic kidney disease, with long-term current use of insulin (H)       * blood glucose monitoring test strip    ONETOUCH VERIO IQ    100 strip    TEST THREE TIMES DAILY OR AS DIRECTED    Uncontrolled type 2 diabetes mellitus with stage 3 chronic kidney disease, with long-term current use of insulin (H)       cholecalciferol 1000 UNITS Tabs      Take 1 tablet by mouth daily        GEODON 80 MG capsule   Generic drug:  ziprasidone      Take 160 mg by mouth every morning And 80 mg daily at 10 AM, and 80 mg nightly as needed        hydrocortisone valerate 0.2 % ointment    WEST-JOE    15 g    Apply sparingly to affected area three times daily for 14 days.    Eczema, unspecified type, Compulsive skin picking, Impetigo       * insulin glargine U-300 300 UNIT/ML injection    TOUJEO SOLOSTAR    29 mL    INJECT 96 UNITS UNDER THE SKIN AT BEDTIME    Uncontrolled type 2 diabetes mellitus with stage 3 chronic kidney disease, with long-term current use of insulin (H)       * insulin glargine U-300 300 UNIT/ML injection    TOUJEO SOLOSTAR    1.5 mL    INJECT 100 UNITS UNDER THE SKIN AT BEDTIME    Uncontrolled type 2 diabetes mellitus with stage 3 chronic kidney disease, with long-term current use of insulin (H)       insulin lispro 100 UNIT/ML injection    HumaLOG KWIKpen    3 mL    Inject 15 Units Subcutaneous 3 times daily (before meals)    Uncontrolled type 2 diabetes mellitus with stage 3 chronic kidney disease, with long-term current use of insulin (H)       nystatin [id number] UNIT/GM Powd    NYSTOP    15 g    APPLY TO GROIN AND UNDER  BREAST TWICE DAILY UNTIL REDNESS HAS RESOLVED.    Candidal intertrigo       order for DME     1 Device    Equipment being ordered: CPAP and associated supplies and tubing    Obstructive sleep apnea       * order for DME     1 each    Equipment being ordered: Lift Chair    Morbid obesity due to excess calories (H), Chronic bilateral low back pain without sciatica       * order for DME     1 Device    Equipment being ordered: corset to prevent compulsive skin picking of abdomen    Compulsive skin picking       * order for DME     1 Device    Equipment being ordered: CPAP machine set at 15 pressure, heated humidifier, supplies: mask and tubing ( supplies)    Obstructive sleep apnea       * order for DME     1 Device    Equipment being ordered: CPAP    Obstructive sleep apnea       * order for DME     1 Device    Equipment being ordered: home blood pressure cuff for home blood pressure monitoring    Hypertension goal BP (blood pressure) < 140/90       PARoxetine 40 MG tablet    PAXIL     Take 80 mg by mouth daily. 80mg=2 tablets.        pravastatin 40 MG tablet    PRAVACHOL    90 tablet    Take 1 tablet (40 mg) by mouth daily    Hyperlipidemia LDL goal <100       senna-docusate 8.6-50 MG per tablet    SENOKOT-S;PERICOLACE     Take 2 tablets by mouth 2 times daily as needed for constipation        traMADol 50 MG tablet    ULTRAM    60 tablet    TAKE ONE TABLET BY MOUTH TWICE DAILY AS NEEDED FOR MODERATE PAIN    Chronic bilateral low back pain without sciatica       valACYclovir 500 MG tablet    VALTREX    90 tablet    Take 1 tablet (500 mg) by mouth daily    Recurrent genital HSV (herpes simplex virus) infection       valsartan-hydrochlorothiazide 160-25 MG per tablet    DIOVAN-HCT    90 tablet    Take 1 tablet by mouth daily    Uncontrolled type 2 diabetes mellitus with stage 3 chronic kidney disease, with long-term current use of insulin (H)       * Notice:  This list has 9 medication(s) that are the same as other  medications prescribed for you. Read the directions carefully, and ask your doctor or other care provider to review them with you.

## 2018-01-25 NOTE — TELEPHONE ENCOUNTER
The problem is, it's not immediately clear to me what the right amount to send to                                        Supply a 3 month prescription would be. That is my intention.    Mateus Roberson MD

## 2018-01-25 NOTE — TELEPHONE ENCOUNTER
Reason for call:  Med question  Patient called regarding (reason for call): prescription-toujeo  Additional comments: need clarification on quantity and directions    Phone number to reach patient:  Other phone number:  [phone]*    Best Time: anytime    Can we leave a detailed message on this number?  YES

## 2018-01-25 NOTE — Clinical Note
Addendum : additional thoughts are   1. We referred to certified diabetes educator ; plausible we should start something like a (glucagon-like peptide-1) GLP-1 agonist which might work wonders for this patient and help her lose weight   2. Referred to [facility] for sports medicine specialist  , Please help see to it that appointment is generated   3. Needs to come back for not an appointment, just the hemoglobin a1c  [ diabetes test ] with laboratory. Laboratory is future ordered

## 2018-01-25 NOTE — PATIENT INSTRUCTIONS
Blood pressure seems to be under good control from your blood pressure in clinic today. Your blood pressure today was 120/70. Record future low blood pressures and bring to next appointment.     X-ray today.     [facility]    If you have any questions regarding to your visit please contact your care team:       Team Red:   Clinic Hours Telephone Number   Dr. Crys Castillo, NP   7am-7pm  Monday - Thursday   7am-5pm  Fridays  [phone]  (Appointment scheduling available 24/7)    Questions about your visit?   Team Line  [phone]   Urgent Care - [place] - 11am-9pm Monday-Friday Saturday-Sunday- 9am-5pm   [place] - 5pm-9pm Monday-Friday Saturday-Sunday- 9am-5pm  [phone] - [place]   [phone] - [place]       What options do I have for visits at the clinic other than the traditional office visit?  To expand how we care for you, many of our providers are utilizing electronic visits (e-visits) and telephone visits, when medically appropriate, for interactions with their patients rather than a visit in the clinic.   We also offer nurse visits for many medical concerns. Just like any other service, we will bill your insurance company for this type of visit based on time spent on the phone with your provider. Not all insurance companies cover these visits. Please check with your medical insurance if this type of visit is covered. You will be responsible for any charges that are not paid by your insurance.      E-visits via fypio:  generally incur a $35.00 fee.  Telephone visits:  Time spent on the phone: *charged based on time that is spent on the phone in increments of 10 minutes. Estimated cost:   5-10 mins $30.00   11-20 mins. $59.00   21-30 mins. $85.00     Use [employer] (secure email communication and access to your chart) to send your primary care provider a message or make an appointment. Ask someone on  your Team how to sign up for [employer].  For a Price Quote for your services, please call our [employer] Line at [phone].      As always, Thank you for trusting us with your health care needs!  Gray Duvall

## 2018-01-25 NOTE — NURSING NOTE
"Chief Complaint   Patient presents with     RECHECK     BP, and blood work      Health Maintenance     Urine, Annual Visit, Eye Exam, Albumin, BMP        Initial /72 (BP Location: Left arm, Patient Position: Chair, Cuff Size: Adult Regular)  Pulse 120  Temp 98.3  F (36.8  C) (Oral)  Resp 15  Ht 5' 4.17\" (1.63 m)  Wt (!) 324 lb (147 kg)  LMP 03/24/2010  SpO2 96%  Breastfeeding? No  BMI 55.32 kg/m2 Estimated body mass index is 55.32 kg/(m^2) as calculated from the following:    Height as of this encounter: 5' 4.17\" (1.63 m).    Weight as of this encounter: 324 lb (147 kg).  Medication Reconciliation: complete      Gray Duvall      "

## 2018-01-25 NOTE — TELEPHONE ENCOUNTER
Noted the 2 prescriptions were sent today  2 different doses were sent  One with quantity of 1.5ml (which is 1 pen) and the other for quantity of 29ml  Toujeo comes in boxes of 3 (total 4.5ml) pens or 5 pens (total 7.5ml)      Medication Detail         Disp Refills Start End TYLER     insulin glargine U-300 (TOUJEO SOLOSTAR) 300 UNIT/ML injection 29 mL 1 1/25/2018  No     Sig: INJECT 96 UNITS UNDER THE SKIN AT BEDTIME     Class: E-Prescribe     Order: [id number]     E-Prescribing Status: Receipt confirmed by pharmacy (1/25/2018  2:50 PM CST)     Medication Detail         Disp Refills Start End TYLER     insulin glargine U-300 (TOUJEO SOLOSTAR) 300 UNIT/ML injection 1.5 mL 0 1/25/2018  No     Sig: INJECT 100 UNITS UNDER THE SKIN AT BEDTIME     Class: E-Prescribe     Order: [id number]     E-Prescribing Status: Receipt confirmed by pharmacy (1/25/2018  2:50 PM CST)

## 2018-01-26 ENCOUNTER — TELEPHONE (OUTPATIENT)
Dept: INTERNAL MEDICINE | Facility: CLINIC | Age: 68
End: 2018-01-26

## 2018-01-26 NOTE — TELEPHONE ENCOUNTER
Please clarify further.    There were 2 prescriptions for Toujeo sent with 2 different doses. 1 for 100 units and the other for 96 units at bedtime  See below    Harshal [place] RN

## 2018-01-26 NOTE — TELEPHONE ENCOUNTER
Reason for call:  Other   Patient called regarding (reason for call): prescription  Additional comments: Pharmacy states they do not dispense CPAP and Blood Pressure equipment. Please contact patient to advise where to get these items.    Phone number to reach patient:  Home number on file [phone] (home)    Best Time:  ASAP    Can we leave a detailed message on this number?  YES

## 2018-01-26 NOTE — TELEPHONE ENCOUNTER
Called patient and left VM to call clinic. [place] team number left.  Stacey CROFT CMA ([facility])

## 2018-01-26 NOTE — TELEPHONE ENCOUNTER
Patient called back and gave numbers to call and make an appointment.     Suzy POWELL CMA ([facility])

## 2018-01-26 NOTE — TELEPHONE ENCOUNTER
Please notify patient of the below    Message  Received: Yesterday       Mateus Roberson MD  P Fz Rn Triage Pool                     Addendum : additional thoughts are     1. We referred to certified diabetes educator ; plausible we should start something like a (glucagon-like peptide-1) GLP-1 agonist which might work wonders for this patient and help her lose weight   Diabetes Education - [facility] [phone]     2. Referred to [facility] for sports medicine specialist, Please help see to it that appointment is generated   [facility]  [phone]     3. Needs to come back for not an appointment, just the hemoglobin a1c  [ diabetes test ] with laboratory. Laboratory is future ordered

## 2018-01-26 NOTE — TELEPHONE ENCOUNTER
Prescription sent.  Nahid with [facility] pharmacy updated    Signed Prescriptions:                        Disp   Refills    insulin glargine U-300 (TOUJEO SOLOSTAR) 3*30 mL  0        Sig: Inject 100 Units Subcutaneous At Bedtime  Authorizing Provider: JESUS ARREOLA  Ordering User: TALITA KRAUSE, RN

## 2018-01-30 DIAGNOSIS — Z53.9 DIAGNOSIS NOT YET DEFINED: Primary | ICD-10-CM

## 2018-01-30 PROCEDURE — G0180 MD CERTIFICATION HHA PATIENT: HCPCS | Performed by: INTERNAL MEDICINE

## 2018-02-05 ENCOUNTER — TELEPHONE (OUTPATIENT)
Dept: INTERNAL MEDICINE | Facility: CLINIC | Age: 68
End: 2018-02-05

## 2018-02-09 ENCOUNTER — TELEPHONE (OUTPATIENT)
Dept: INTERNAL MEDICINE | Facility: CLINIC | Age: 68
End: 2018-02-09

## 2018-02-09 NOTE — TELEPHONE ENCOUNTER
Spoke with Therese, she is a RN with [facility]. Was referred to home care services from pt's care coordinator and is transferring rom accurate home care.   Nurse is with pt right now and has questions about her current insulin orders. States pt's info states she is to take 16 humalog and has been on this dose for a couple of months and 96 units lantus. Advised that our current med list says 15 units 3 times a day and 100 units  Toujeo.  Gave her pt's last f2f date with provider. No further questions or concerns.    Stacey Alexander RN  [facility]

## 2018-02-09 NOTE — TELEPHONE ENCOUNTER
Attempted to call Therese, however, her voice mail box is full.  Will try again later.  Need to clarify who Therese is or where she is from before giving out this information. Camila Rudolph,

## 2018-02-09 NOTE — TELEPHONE ENCOUNTER
Reason for Call:  Order    Detailed comments: Caller is needing information on patient's last visit and the date of the visit for Medicaid face to face. Please call.    Phone Number Patient can be reached at: Other phone number: [phone]    Best Time: any    Can we leave a detailed message on this number? YES    Call taken on 2/9/2018 at 10:22 AM by Carolynn Bryant

## 2018-02-14 ENCOUNTER — TELEPHONE (OUTPATIENT)
Dept: PALLIATIVE MEDICINE | Facility: CLINIC | Age: 68
End: 2018-02-14

## 2018-02-14 ENCOUNTER — OFFICE VISIT (OUTPATIENT)
Dept: ORTHOPEDICS | Facility: CLINIC | Age: 68
End: 2018-02-14
Payer: COMMERCIAL

## 2018-02-14 DIAGNOSIS — M79.18 GLUTEAL PAIN: ICD-10-CM

## 2018-02-14 DIAGNOSIS — M53.3 SI (SACROILIAC) JOINT DYSFUNCTION: Primary | ICD-10-CM

## 2018-02-14 PROCEDURE — 99203 OFFICE O/P NEW LOW 30 MIN: CPT | Performed by: FAMILY MEDICINE

## 2018-02-14 NOTE — MR AVS SNAPSHOT
After Visit Summary   2/14/2018    Marina Valdez    MRN: [id number]           Patient Information     Date Of Birth          1950        Visit Information        Provider Department      2/14/2018 3:00 PM Alfonso Lobo DO [facility]        Today's Diagnoses     SI (sacroiliac) joint dysfunction    -  1    Gluteal pain           Follow-ups after your visit        Additional Services     PAIN MANAGEMENT REFERRAL       Your provider has referred you to: G: [facility] -    Reason for Referral: Procedure Order Joint Injection:  SI, right      What is your diagnosis for the patient's pain? (M53.3) SI (sacroiliac) joint dysfunction  (primary encounter diagnosis)  Comment: will schedule with Dana Rodriguez helping with scheduling  Plan: will be scheduled for 2/15 at 1115 in [facility]                  For any questions, contact the [facility] at [phone].     **ANY DIAGNOSTIC TESTS THAT ARE NOT IN [facility] SHOULD BE SENT TO THE PAIN CENTER**    REGARDING OPIOID MEDICATIONS:  The discussion of opioids management, appropriateness of therapy, and dosing will be discussed in patients being seen for evaluation.  The pain management clinics are not long-term prescribing clinics, with transition of prescribing of medications ultimately going back to the referring provider/PCP.  If prescribing is taken over at the pain clinic, it is in actively involved patients whom are appropriate for opioids, urine drug screening is completed, and long-term prescribing plan has been determined.  Therefore, we will not be automatically taking over prescribing at the patient's first visit.  Is this agreeable to you? agrees.     Please be aware that coverage of these services is subject to the terms and limitations of your health insurance plan.  Call member services at your health plan with any benefit or coverage questions.   "    Please bring the following with you to your appointment:    (1) Any X-Rays, CTs or MRIs which have been performed.  Contact the facility where they were done to arrange for  prior to your scheduled appointment.    (2) List of current medications   (3) This referral request   (4) Any documents/labs given to you for this referral                  Your next 10 appointments already scheduled     Feb 15, 2018 11:15 AM CST   Radiology Injections with Mariam Beavers MD   [facility] ([facility])    [address] [place] [id number] [phone]            Feb 15, 2018 12:30 PM CST   Diabetic Education with  DIABETIC ED RESOURCE   [facility] ([facility])    [address] [place] [id number] [phone]              Who to contact     If you have questions or need follow up information about today's clinic visit or your schedule please contact [facility] directly at [phone].  Normal or non-critical lab and imaging results will be communicated to you by IPWirelesshart, letter or phone within 4 business days after the clinic has received the results. If you do not hear from us within 7 days, please contact the clinic through MyChart or phone. If you have a critical or abnormal lab result, we will notify you by phone as soon as possible.  Submit refill requests through [employer] or call your pharmacy and they will forward the refill request to us. Please allow 3 business days for your refill to be completed.          Additional Information About Your Visit        [employer] Information     [employer] lets you send messages to your doctor, view your test results, renew your prescriptions, schedule appointments and more. To sign up, go to [email or web address] . Click on \"Log in\" on the left side of the screen, which will take you to the Welcome page. Then click on \"Sign up Now\" on " the right side of the page.     You will be asked to enter the access code listed below, as well as some personal information. Please follow the directions to create your username and password.     Your access code is: [email or web address]  Expires: 2018  3:01 PM     Your access code will  in 90 days. If you need help or a new code, please call your [facility] [facility] clinic or [phone].        Care EveryWhere ID     This is your Care EveryWhere ID. This could be used by other organizations to access your [facility] medical records  [id number]        Your Vitals Were     Last Period                   2010            Blood Pressure from Last 3 Encounters:   18 132/72   10/17/17 128/74   17 131/85    Weight from Last 3 Encounters:   18 (!) 324 lb (147 kg)   17 (!) 323 lb 6.4 oz (146.7 kg)   10/17/17 (!) 312 lb (141.5 kg)              We Performed the Following     PAIN MANAGEMENT REFERRAL        Primary Care Provider Office Phone # Fax #    Mateus Roberson [phone]       [address] [place] [address]        Equal Access to Services     GIO GUILLEN AH: Hadii aad ku hadasho Soomaali, waaxda luqadaha, qaybta kaalmada adeegyada, sanjana parra. So [facility] [phone].    ATENCIÓN: Si habla español, tiene a garza disposición servicios gratuitos de asistencia lingüística. Llame al [phone].    We comply with applicable federal civil rights laws and [place] laws. We do not discriminate on the basis of race, color, national origin, age, disability, sex, sexual orientation, or gender identity.            Thank you!     Thank you for choosing [facility]  for your care. Our goal is always to provide you with excellent care. Hearing back from our patients is one way we can continue to improve our services. Please take a few minutes to complete the written survey that you may receive in the mail after your visit with  us. Thank you!             Your Updated Medication List - Protect others around you: Learn how to safely use, store and throw away your medicines at www.disposemymeds.org.          This list is accurate as of 2/14/18  3:07 PM.  Always use your most recent med list.                   Brand Name Dispense Instructions for use Diagnosis    acetaminophen 500 MG tablet    TYLENOL     Take 500 mg by mouth        aspirin 81 MG tablet     100 tablet    Take 1 tablet (81 mg) by mouth daily    Type 2 diabetes, HbA1C goal < 8% (H)       B-D U/F 31G X 8 MM   Generic drug:  insulin pen needle     200 each    USE FOUR TIMES DAILY AS DIRECTED WITH LANTUS AND PREMEAL INSULIN    Uncontrolled type 2 diabetes mellitus with stage 3 chronic kidney disease, with long-term current use of insulin (H)       BENADRYL 25 MG tablet   Generic drug:  diphenhydrAMINE      Take 25 mg by mouth At Bedtime And 25 mg nightly as needed        blood glucose monitoring lancets     300 each    Use to test blood sugars 3 times daily or as directed. Qty of 1 box= 100 lancets    Uncontrolled type 2 diabetes mellitus with stage 3 chronic kidney disease, with long-term current use of insulin (H)       * blood glucose monitoring test strip    no brand specified    300 strip    Use to test blood sugars 3 times daily or as directed    Uncontrolled type 2 diabetes mellitus with stage 3 chronic kidney disease, with long-term current use of insulin (H)       * blood glucose monitoring test strip    ONETOUCH VERIO IQ    100 strip    TEST THREE TIMES DAILY OR AS DIRECTED    Uncontrolled type 2 diabetes mellitus with stage 3 chronic kidney disease, with long-term current use of insulin (H)       cholecalciferol 1000 UNITS Tabs      Take 1 tablet by mouth daily        GEODON 80 MG capsule   Generic drug:  ziprasidone      Take 160 mg by mouth every morning And 80 mg daily at 10 AM, and 80 mg nightly as needed        hydrocortisone valerate 0.2 % ointment    [facility]     15 g    Apply sparingly to affected area three times daily for 14 days.    Eczema, unspecified type, Compulsive skin picking, Impetigo       insulin glargine U-300 300 UNIT/ML injection    TOUJEO SOLOSTAR    30 mL    Inject 100 Units Subcutaneous At Bedtime    Uncontrolled type 2 diabetes mellitus with stage 3 chronic kidney disease, with long-term current use of insulin (H)       insulin lispro 100 UNIT/ML injection    HumaLOG KWIKpen    3 mL    Inject 15 Units Subcutaneous 3 times daily (before meals)    Uncontrolled type 2 diabetes mellitus with stage 3 chronic kidney disease, with long-term current use of insulin (H)       nystatin 919231 UNIT/GM Powd    NYSTOP    15 g    APPLY TO GROIN AND UNDER BREAST TWICE DAILY UNTIL REDNESS HAS RESOLVED.    Candidal intertrigo       order for DME     1 Device    Equipment being ordered: CPAP and associated supplies and tubing    Obstructive sleep apnea       * order for DME     1 each    Equipment being ordered: Lift Chair    Morbid obesity due to excess calories (H), Chronic bilateral low back pain without sciatica       * order for DME     1 Device    Equipment being ordered: corset to prevent compulsive skin picking of abdomen    Compulsive skin picking       * order for DME     1 Device    Equipment being ordered: CPAP machine set at 15 pressure, heated humidifier, supplies: mask and tubing ( supplies)    Obstructive sleep apnea       * order for DME     1 Device    Equipment being ordered: CPAP    Obstructive sleep apnea       * order for DME     1 Device    Equipment being ordered: home blood pressure cuff for home blood pressure monitoring    Hypertension goal BP (blood pressure) < 140/90       PARoxetine 40 MG tablet    PAXIL     Take 80 mg by mouth daily. 80mg=2 tablets.        pravastatin 40 MG tablet    PRAVACHOL    90 tablet    Take 1 tablet (40 mg) by mouth daily    Hyperlipidemia LDL goal <100       senna-docusate 8.6-50 MG per tablet    SENOKOT-S;PERICOLACE      Take 2 tablets by mouth 2 times daily as needed for constipation        traMADol 50 MG tablet    ULTRAM    60 tablet    TAKE ONE TABLET BY MOUTH TWICE DAILY AS NEEDED FOR MODERATE PAIN    Chronic bilateral low back pain without sciatica       valACYclovir 500 MG tablet    VALTREX    90 tablet    Take 1 tablet (500 mg) by mouth daily    Recurrent genital HSV (herpes simplex virus) infection       valsartan-hydrochlorothiazide 160-25 MG per tablet    DIOVAN-HCT    90 tablet    Take 1 tablet by mouth daily    Uncontrolled type 2 diabetes mellitus with stage 3 chronic kidney disease, with long-term current use of insulin (H)       * Notice:  This list has 7 medication(s) that are the same as other medications prescribed for you. Read the directions carefully, and ask your doctor or other care provider to review them with you.

## 2018-02-14 NOTE — TELEPHONE ENCOUNTER
Pre-screening Questions for Radiology Injections:    Injection to be done at which interventional clinic site? [facility]    Procedure ordered by Dr. Lobo    Procedure ordered? R sacroiliac joint injection      What insurance would patient like us to bill for this procedure? Beth PERRY and Medicare      Worker's comp or MVA (motor vehicle accident) -Any injection DO NOT SCHEDULE and route to Brenda De La Garza.      [employer] insurance - For SI joint injections, DO NOT SCHEDULE and route Ashanti Fuentes.      [employer]- MBB's must be scheduled at LEAST two weeks apart      [employer] - Any injection besides hip/shoulder/knee joint DO NOT SCHEDULE and route to Ashanti Fuentes. She will obtain PA and call pt back to schedule procedure or notify pt of denial.       HP [employer]-PA REQUIRED FOR NON-KAYE OR Joint injections    Any chance of pregnancy? NO   If YES, do NOT schedule and route to RN pool    Is an  needed? No     Patient has a drive home? (mandatory) YES:     Is patient taking any blood thinners (plavix, coumadin, jantoven, warfarin, heparin, pradaxa or dabigatran )? No   If hold needed, do NOT schedule, route to RN pool     Is patient taking any aspirin products? Yes - Pt takes 81mg daily; instructed to hold 0 day(s) prior to procedure.      If more than 325mg/day do NOT schedule; route to RN pool     For CERVICAL procedures, hold all aspirin products for 6 days.      Does the patient have a bleeding or clotting disorder? No     If YES, okay to schedule AND route to RN nurse pool    **For any patients with platelet count <100, must be forwarded to provider**    Is patient diabetic?  Yes  If YES, have them bring their glucometer.    Does patient have an active infection or treated for one within the past week? No     Is patient currently taking any antibiotics?  No     For patients on chronic, preventative, or prophylactic antibiotics, procedures may be scheduled.     For patients  on antibiotics for active or recent infection:    Nimesh Trujillo Burton, Snitzer-antibiotic course must have been completed for 4 days    Dr. Castro-antibiotic course must have been completed for 7 days    Is patient currently taking any steroid medications? (i.e. Prednisone, Medrol)  No     For patients on steroid medications:    Nimesh Trujillo Burton, Snitzer-steroid course must have been completed for 4 days    -steroid course must have been completed for 7 days    Reviewed with patient:  If you are started on any steroids or antibiotics between now and your appointment, you must contact us because it may affect our ability to perform your procedure.  Yes    Is patient actively being treated for cancer or immunocompromised? No  If YES, do NOT schedule and route to RN pool     Are you able to get on and off an exam table with minimal or no assistance? No  Will need assistance  If NO, do NOT schedule and route to RN pool    Are you able to roll over and lay on your stomach with minimal or no assistance? No will need assistance  If NO, do NOT schedule and route to RN pool     Any allergies to contrast dye, iodine, shellfish, or numbing and steroid medications? No  If YES, route to RN pool AND add allergy information to appointment notes    Allergies: Other [seasonal allergies]      Has the patient had a flu shot or any other vaccinations within 7 days before or after the procedure.  No     Does patient have an MRI/CT?  Not Applicable  (SI joint, hip injections, lumbar sympathetic blocks, and stellate ganglion blocks do not require an MRI)    Was the MRI done w/in the last 3 years?  NA    Was MRI done at [facility]? No  NA    If not, where was it done? N/A       If MRI was not done at [facility], [facility] or [facility] do NOT schedule and route to nursing.  If pt has an imaging disc, the injection may be scheduled but pt has to bring disc to appt. If they show up w/out disc the injection cannot  be done    Reminders (please tell patient if applicable):       Instructed pt to arrive 30 minutes early for IV start if this is for a cervical procedure, ALL sympathetic (stellate ganglion, hypogastric, or lumbar sympathetic block) and all sedation procedures (RFA, spinal cord stimulation trials).  Not Applicable   -IVs are not routinely placed for Dr. Du cervical cases   -Dr. Clark: IVs for cervical ESIs and cervical TBDs (not CMBBs/facet inj)      If NPO for sedation, informed patient that it is okay to take medications with sips of water (except if they are to hold blood thinners).  Not Applicable   *DO take blood pressure medication if it is prescribed*      If this is for a cervical KAYE, informed patient that aspirin needs to be held for 6 days.   Not Applicable      For all patients not having spinal cord stimulator (SCS) trials or radiofrequency ablations (RFAs), informed patient:    IV sedation is not provided for this procedure.  If you feel that an oral anti-anxiety medication is needed, you can discuss this further with your referring provider or primary care provider.  The Pain Clinic provider will discuss specifics of what the procedure includes at your appointment.  Most procedures last 10-20 minutes.  We use numbing medications to help with any discomfort during the procedure.  Not Applicable      Do not schedule procedures requiring IV placement in the first appointment of the day or first appointment after lunch. na      For patients 85 or older we recommend having an adult stay w/ them for the remainder of the day.   na    Does the patient have any questions?  BENNIE turcios  [facility]

## 2018-02-14 NOTE — PROGRESS NOTES
Marina Valdez  :  1950  DOS: 2018  MRN: [id number]    Sports Medicine Clinic Visit    PCP: Mateus Roberson    Marina Valdez is a 67 year old female who is seen in consultation at the request of  Mateus Roberson M.D. presenting with chronic radiating low back pain.    Injury: Gradual onset of right sided low back pain over the last ~ 1 year.  Pain located over right lower lumbar spine, SI joint, radiating to right buttoks.  Reports constant radiating, pain to right buttocks.  Additional Features:  Positive: weakness and muscle spasm.  Symptoms are better with Other medications: Tramadol and Rest.  Symptoms are worse with: moving, lying supine, prolonged sitting, bending at waist.  Other evaluation and/or treatments so far consists of: Other medications: Tramadol, Rest and home physical therapy, PCP consult.  Recent imaging completed: X-rays pelvis completed 18.  Prior History of related problems: H/o intermittent low back pain.  Severely deconditioned.    Social History: disabled    Review of Systems  Musculoskeletal: as above  Remainder of review of systems is negative including constitutional, CV, pulmonary, GI, Skin and Neurologic except as noted in HPI or medical history.    Past Medical History:   Diagnosis Date     Bipolar affect, depressed (H)      Diabetes      Endometrial cancer (H)      HTN (hypertension)      Morbid obesity due to excess calories (H)      CORDELL (obstructive sleep apnea)     uses CPAP     Renal disease      Schizophrenia (H)      Past Surgical History:   Procedure Laterality Date     cataract      bilateral     CYSTOSCOPY N/A 2016    Procedure: CYSTOSCOPY;  Surgeon: Franca Rousseau MD;  Location: [facility] OR     DAVINCI HYSTERECTOMY TOTAL, BILATERAL SALPINGO-OOPHORECTOMY, COMBINED N/A 2016    Procedure: COMBINED DAVINCI HYSTERECTOMY TOTAL, SALPINGO-OOPHORECTOMY;  Surgeon: Franca Rousseau MD;  Location: [facility] OR     LAPAROSCOPY      for  "endometriosis     OOPHORECTOMY      right     WRIST SURGERY      right     Objective  Ht (P) 5' 4\" (1.626 m)  LMP 03/24/2010    General: healthy, alert and in no distress, morbidly obese  HEENT: no scleral icterus or conjunctival erythema   Skin: no suspicious lesions or rash. No jaundice.   CV: regular rhythm by palpation, 2+ distal pulses, no pedal edema    Resp: normal respiratory effort without conversational dyspnea   Psych: normal mood and affect    Gait: nonantalgic, appropriate coordination and balance   Neuro: normal light touch sensory exam of the extremities. Motor strength as noted below       Low back exam:    Inspection:       no visible deformity in the low back       normal skin       normal vascular       normal lymphatic    ROM:       Baseline flexion and extension, limited mostly by deconditioning and habitus    Tender:       paraspinal muscles minimal       Focal of Right SI joint and R gluteal muscles       Milder over greater trochanter    Non Tender:       remainder of lumbar spine       midline    Strength:       hip flexion 3/5       knee extension 3/5       ankle dorsiflexion 3/5       ankle plantarflexion 3/5       dorsiflexion of the great toe 3/5       Deconditioned, no apparent mini weakness    Reflexes:       patellar (L3, L4) symmetric normal       achilles tendons (S1) symmetric normal    Sensation:      grossly intact throughout lower extremities    Skin:       well perfused       capillary refill brisk    Special tests:       positive (+) MESFIN on R, performed seated        slump test neg       Radiology:  Recent Results (from the past 744 hour(s))   XR Pelvis 1/2 Views    Narrative    PELVIS ONE TO TWO VIEWS  1/25/2018 3:25 PM     HISTORY: Sacroiliac pain.    COMPARISON: None      Impression    IMPRESSION: Exam limited by slight rotation. No definite erosive or  hypertrophic changes. Mild loss of joint space in both hips. No acute  fracture or dislocation.      VI SUAZO MD "       Assessment:  1. SI (sacroiliac) joint dysfunction    2. Gluteal pain        Plan:  Discussed the assessment with the patient.  Follow up: prn based on injection results  Able to schedule SI joint injection with Dr Beavers for tomorrow  Consider piriformis or other TPI for ongoing pain  PT value reviewed, offered, ultimately deferred for now  ddx include piriformis syndrome, some component of trochanteric bursitis/gluteal tendinitis, cannot r/o lumbar involvement but less likely  We discussed modified progressive pain-free activity as tolerated  Home handouts provided and supportive care reviewed  All questions were answered today  Contact us with additional questions or concerns  Signs and sx of concern reviewed      Alfonso Lobo DO, AMELIE  Primary Care Sports Medicine  [facility]             Disclaimer: This note consists of symbols derived from keyboarding, dictation and/or voice recognition software. As a result, there may be errors in the script that have gone undetected. Please consider this when interpreting information found in this chart.

## 2018-02-14 NOTE — LETTER
2018         RE: Marina Valdez  [address]    [place] [facility] [address]        Dear Colleague,    Thank you for referring your patient, Marina Vadlez, to the [facility]. Please see a copy of my visit note below.    Marina Valdez  :  1950  DOS: 2018  MRN: [id number]    Sports Medicine Clinic Visit    PCP: Mateus Roberson    Marina Valdez is a 67 year old female who is seen in consultation at the request of  Mateus Roberson M.D. presenting with chronic radiating low back pain.    Injury: Gradual onset of right sided low back pain over the last ~ 1 year.  Pain located over right lower lumbar spine, SI joint, radiating to right buttoks.  Reports constant radiating, pain to right buttocks.  Additional Features:  Positive: weakness and muscle spasm.  Symptoms are better with Other medications: Tramadol and Rest.  Symptoms are worse with: moving, lying supine, prolonged sitting, bending at waist.  Other evaluation and/or treatments so far consists of: Other medications: Tramadol, Rest and home physical therapy, PCP consult.  Recent imaging completed: X-rays pelvis completed 18.  Prior History of related problems: H/o intermittent low back pain.  Severely deconditioned.    Social History: disabled    Review of Systems  Musculoskeletal: as above  Remainder of review of systems is negative including constitutional, CV, pulmonary, GI, Skin and Neurologic except as noted in HPI or medical history.    Past Medical History:   Diagnosis Date     Bipolar affect, depressed (H)      Diabetes      Endometrial cancer (H)      HTN (hypertension)      Morbid obesity due to excess calories (H)      CORDELL (obstructive sleep apnea)     uses CPAP     Renal disease      Schizophrenia (H)      Past Surgical History:   Procedure Laterality Date     cataract      bilateral     CYSTOSCOPY N/A 2016    Procedure: CYSTOSCOPY;  Surgeon: Franca Rousseau  "MD Matthew;  Location: [facility] OR     DAVINCI HYSTERECTOMY TOTAL, BILATERAL SALPINGO-OOPHORECTOMY, COMBINED N/A 12/29/2016    Procedure: COMBINED DAVINCI HYSTERECTOMY TOTAL, SALPINGO-OOPHORECTOMY;  Surgeon: Franca Rousseau MD;  Location: [facility] OR     LAPAROSCOPY      for endometriosis     OOPHORECTOMY      right     WRIST SURGERY      right     Objective  Ht (P) 5' 4\" (1.626 m)  LMP 03/24/2010    General: healthy, alert and in no distress, morbidly obese  HEENT: no scleral icterus or conjunctival erythema   Skin: no suspicious lesions or rash. No jaundice.   CV: regular rhythm by palpation, 2+ distal pulses, no pedal edema    Resp: normal respiratory effort without conversational dyspnea   Psych: normal mood and affect    Gait: nonantalgic, appropriate coordination and balance   Neuro: normal light touch sensory exam of the extremities. Motor strength as noted below       Low back exam:    Inspection:       no visible deformity in the low back       normal skin       normal vascular       normal lymphatic    ROM:       Baseline flexion and extension, limited mostly by deconditioning and habitus    Tender:       paraspinal muscles minimal       Focal of Right SI joint and R gluteal muscles       Milder over greater trochanter    Non Tender:       remainder of lumbar spine       midline    Strength:       hip flexion 3/5       knee extension 3/5       ankle dorsiflexion 3/5       ankle plantarflexion 3/5       dorsiflexion of the great toe 3/5       Deconditioned, no apparent mini weakness    Reflexes:       patellar (L3, L4) symmetric normal       achilles tendons (S1) symmetric normal    Sensation:      grossly intact throughout lower extremities    Skin:       well perfused       capillary refill brisk    Special tests:       positive (+) MESFIN on R, performed seated        slump test neg       Radiology:  Recent Results (from the past 744 hour(s))   XR Pelvis 1/2 Views    Narrative    PELVIS ONE TO TWO VIEWS  " 1/25/2018 3:25 PM     HISTORY: Sacroiliac pain.    COMPARISON: None      Impression    IMPRESSION: Exam limited by slight rotation. No definite erosive or  hypertrophic changes. Mild loss of joint space in both hips. No acute  fracture or dislocation.      VI SUZAO MD       Assessment:  1. SI (sacroiliac) joint dysfunction    2. Gluteal pain        Plan:  Discussed the assessment with the patient.  Follow up: prn based on injection results  Able to schedule SI joint injection with Dr Beavers for tomorrow  Consider piriformis or other TPI for ongoing pain  PT value reviewed, offered, ultimately deferred for now  ddx include piriformis syndrome, some component of trochanteric bursitis/gluteal tendinitis, cannot r/o lumbar involvement but less likely  We discussed modified progressive pain-free activity as tolerated  Home handouts provided and supportive care reviewed  All questions were answered today  Contact us with additional questions or concerns  Signs and sx of concern reviewed      Alfonso Lobo DO, AMELIE  Primary Care Sports Medicine  [facility]             Disclaimer: This note consists of symbols derived from keyboarding, dictation and/or voice recognition software. As a result, there may be errors in the script that have gone undetected. Please consider this when interpreting information found in this chart.      Again, thank you for allowing me to participate in the care of your patient.        Sincerely,        Alfonso Lobo DO

## 2018-02-14 NOTE — PATIENT INSTRUCTIONS
A R sacroiliac joint injection  has been scheduled for tomorrow at 1115.  A  is needed.    Location: [facility]- 2nd floor    [address] [place] [address]    [phone]-to change appt for the injection  ------  Nurse line: [phone]  Appt line:  [phone]

## 2018-02-15 ENCOUNTER — RADIOLOGY INJECTION OFFICE VISIT (OUTPATIENT)
Dept: PALLIATIVE MEDICINE | Facility: CLINIC | Age: 68
End: 2018-02-15
Payer: COMMERCIAL

## 2018-02-15 ENCOUNTER — RADIANT APPOINTMENT (OUTPATIENT)
Dept: RADIOLOGY | Facility: CLINIC | Age: 68
End: 2018-02-15
Attending: PSYCHIATRY & NEUROLOGY
Payer: COMMERCIAL

## 2018-02-15 VITALS — HEART RATE: 112 BPM | OXYGEN SATURATION: 92 % | SYSTOLIC BLOOD PRESSURE: 135 MMHG | DIASTOLIC BLOOD PRESSURE: 56 MMHG

## 2018-02-15 DIAGNOSIS — M53.3 SI (SACROILIAC) JOINT DYSFUNCTION: ICD-10-CM

## 2018-02-15 DIAGNOSIS — M53.3 SI (SACROILIAC) JOINT DYSFUNCTION: Primary | ICD-10-CM

## 2018-02-15 PROCEDURE — 27096 INJECT SACROILIAC JOINT: CPT | Mod: RT | Performed by: PSYCHIATRY & NEUROLOGY

## 2018-02-15 ASSESSMENT — PAIN SCALES - GENERAL: PAINLEVEL: SEVERE PAIN (7)

## 2018-02-15 NOTE — MR AVS SNAPSHOT
After Visit Summary   2/15/2018    Marina Valdez    MRN: [id number]           Patient Information     Date Of Birth          1950        Visit Information        Provider Department      2/15/2018 11:15 AM Mariam Beavers MD [facility]        Care Instructions    If you have issues with your blood sugars the nurse line to call for [place] is:  [phone]    [facility]   Procedure Discharge Instructions    Today you saw: Dr. Mariam Beavers      You had an:    sacro-iliac joint injection        Medications used:  Lidocaine    Omnipaque  Ropivicaine   Kenalog             Be cautious when walking. Numbness and/or weakness in the lower extremities may occur for up to 6-8 hours after the procedure due to effect of the local anesthetic    Do not drive for 6 hours. The effect of the local anesthetic could slow your reflexes.     You may resume your regular activities after 24 hours    Avoid strenuous activity for the first 24 hours    You may shower, however avoid swimming, tub baths or hot tubs for 24 hours following your procedure    You may have a mild to moderate increase in pain for several days following the injection.    It may take up to 14 days for the steroid medication to start working although you may feel the effect as early as a few days after the procedure.       You may use ice packs for 10-15 minutes, 3 to 4 times a day at the injection site for comfort    Do not use heat to painful areas for 6 to 8 hours. This will give the local anesthetic time to wear off and prevent you from accidentally burning your skin.     You may use anti-inflammatory medications (such as Ibuprofen or Aleve or Advil) or Tylenol for pain control if necessary    If you were fasting, you may resume your normal diet and medications after the procedure    If you have diabetes, check your blood sugar more frequently than usual as your blood sugar may be higher than  "normal for 10-14 days following a steroid injection. Contact your doctor who manages your diabetes if your blood sugar is higher than usual    If you experience any of the following, call the pain center nursing line during work hours at [phone] or the after hours provider line at [phone]:  -Fever over 100 degree F  -Swelling, bleeding, redness, drainage, warmth at the injection site  -Progressive weakness or numbness in your legs or arms  -Loss of bowel or bladder function  -Unusual headache that is not relieved by Tylenol or other pain reliever  -Unusual new onset of pain that is not improving      Phone #s:  Appointment line: [phone];  Nurse line: [phone]              Follow-ups after your visit        Who to contact     If you have questions or need follow up information about today's clinic visit or your schedule please contact [facility] directly at [phone].  Normal or non-critical lab and imaging results will be communicated to you by [employer], letter or phone within 4 business days after the clinic has received the results. If you do not hear from us within 7 days, please contact the clinic through [employer] or phone. If you have a critical or abnormal lab result, we will notify you by phone as soon as possible.  Submit refill requests through [employer] or call your pharmacy and they will forward the refill request to us. Please allow 3 business days for your refill to be completed.          Additional Information About Your Visit        [employer] Information     [employer] lets you send messages to your doctor, view your test results, renew your prescriptions, schedule appointments and more. To sign up, go to [email or web address] . Click on \"Log in\" on the left side of the screen, which will take you to the Welcome page. Then click on \"Sign up Now\" on the right side of the page.     You will be asked to enter the access code listed below, as well as some personal information. " Please follow the directions to create your username and password.     Your access code is: [email or web address]  Expires: 2018  3:01 PM     Your access code will  in 90 days. If you need help or a new code, please call your [facility] clinic or [phone].        Care EveryWhere ID     This is your Care EveryWhere ID. This could be used by other organizations to access your [facility] medical records  [id number]        Your Vitals Were     Pulse Last Period                116 2010           Blood Pressure from Last 3 Encounters:   02/15/18 132/74   18 132/72   10/17/17 128/74    Weight from Last 3 Encounters:   18 (!) 147 kg (324 lb)   17 (!) 146.7 kg (323 lb 6.4 oz)   10/17/17 (!) 141.5 kg (312 lb)              Today, you had the following     No orders found for display       Primary Care Provider Office Phone # Fax #    Mateus Roberson [phone] [address] [facility] [address]        Equal Access to Services     [facility]: Hadii aad ku hadasho Soomaali, waaxda luqadaha, qaybta kaalmada adeegyada, sanjana vega . So [facility] [phone].    ATENCIÓN: Si habla español, tiene a garza disposición servicios gratuitos de asistencia lingüística. Llame al [phone].    We comply with applicable federal civil rights laws and [place] laws. We do not discriminate on the basis of race, color, national origin, age, disability, sex, sexual orientation, or gender identity.            Thank you!     Thank you for choosing [facility]  for your care. Our goal is always to provide you with excellent care. Hearing back from our patients is one way we can continue to improve our services. Please take a few minutes to complete the written survey that you may receive in the mail after your visit with us. Thank you!             Your Updated Medication List - Protect others around you: Learn how to safely use, store and throw away your  medicines at www.disposemymeds.org.          This list is accurate as of 2/15/18 11:34 AM.  Always use your most recent med list.                   Brand Name Dispense Instructions for use Diagnosis    acetaminophen 500 MG tablet    TYLENOL     Take 500 mg by mouth        aspirin 81 MG tablet     100 tablet    Take 1 tablet (81 mg) by mouth daily    Type 2 diabetes, HbA1C goal < 8% (H)       B-D U/F 31G X 8 MM   Generic drug:  insulin pen needle     200 each    USE FOUR TIMES DAILY AS DIRECTED WITH LANTUS AND PREMEAL INSULIN    Uncontrolled type 2 diabetes mellitus with stage 3 chronic kidney disease, with long-term current use of insulin (H)       BENADRYL 25 MG tablet   Generic drug:  diphenhydrAMINE      Take 25 mg by mouth At Bedtime And 25 mg nightly as needed        blood glucose monitoring lancets     300 each    Use to test blood sugars 3 times daily or as directed. Qty of 1 box= 100 lancets    Uncontrolled type 2 diabetes mellitus with stage 3 chronic kidney disease, with long-term current use of insulin (H)       * blood glucose monitoring test strip    no brand specified    300 strip    Use to test blood sugars 3 times daily or as directed    Uncontrolled type 2 diabetes mellitus with stage 3 chronic kidney disease, with long-term current use of insulin (H)       * blood glucose monitoring test strip    ONETOUCH VERIO IQ    100 strip    TEST THREE TIMES DAILY OR AS DIRECTED    Uncontrolled type 2 diabetes mellitus with stage 3 chronic kidney disease, with long-term current use of insulin (H)       cholecalciferol 1000 UNITS Tabs      Take 1 tablet by mouth daily        GEODON 80 MG capsule   Generic drug:  ziprasidone      Take 160 mg by mouth every morning And 80 mg daily at 10 AM, and 80 mg nightly as needed        hydrocortisone valerate 0.2 % ointment    WEST-JOE    15 g    Apply sparingly to affected area three times daily for 14 days.    Eczema, unspecified type, Compulsive skin picking, Impetigo        insulin glargine U-300 300 UNIT/ML injection    TOUJEO SOLOSTAR    30 mL    Inject 100 Units Subcutaneous At Bedtime    Uncontrolled type 2 diabetes mellitus with stage 3 chronic kidney disease, with long-term current use of insulin (H)       insulin lispro 100 UNIT/ML injection    HumaLOG KWIKpen    3 mL    Inject 15 Units Subcutaneous 3 times daily (before meals)    Uncontrolled type 2 diabetes mellitus with stage 3 chronic kidney disease, with long-term current use of insulin (H)       nystatin 304392 UNIT/GM Powd    NYSTOP    15 g    APPLY TO GROIN AND UNDER BREAST TWICE DAILY UNTIL REDNESS HAS RESOLVED.    Candidal intertrigo       order for DME     1 Device    Equipment being ordered: CPAP and associated supplies and tubing    Obstructive sleep apnea       * order for DME     1 each    Equipment being ordered: Lift Chair    Morbid obesity due to excess calories (H), Chronic bilateral low back pain without sciatica       * order for DME     1 Device    Equipment being ordered: corset to prevent compulsive skin picking of abdomen    Compulsive skin picking       * order for DME     1 Device    Equipment being ordered: CPAP machine set at 15 pressure, heated humidifier, supplies: mask and tubing ( supplies)    Obstructive sleep apnea       * order for DME     1 Device    Equipment being ordered: CPAP    Obstructive sleep apnea       * order for DME     1 Device    Equipment being ordered: home blood pressure cuff for home blood pressure monitoring    Hypertension goal BP (blood pressure) < 140/90       PARoxetine 40 MG tablet    PAXIL     Take 80 mg by mouth daily. 80mg=2 tablets.        pravastatin 40 MG tablet    PRAVACHOL    90 tablet    Take 1 tablet (40 mg) by mouth daily    Hyperlipidemia LDL goal <100       senna-docusate 8.6-50 MG per tablet    SENOKOT-S;PERICOLACE     Take 2 tablets by mouth 2 times daily as needed for constipation        traMADol 50 MG tablet    ULTRAM    60 tablet    TAKE ONE  TABLET BY MOUTH TWICE DAILY AS NEEDED FOR MODERATE PAIN    Chronic bilateral low back pain without sciatica       valACYclovir 500 MG tablet    VALTREX    90 tablet    Take 1 tablet (500 mg) by mouth daily    Recurrent genital HSV (herpes simplex virus) infection       valsartan-hydrochlorothiazide 160-25 MG per tablet    DIOVAN-HCT    90 tablet    Take 1 tablet by mouth daily    Uncontrolled type 2 diabetes mellitus with stage 3 chronic kidney disease, with long-term current use of insulin (H)       * Notice:  This list has 7 medication(s) that are the same as other medications prescribed for you. Read the directions carefully, and ask your doctor or other care provider to review them with you.

## 2018-02-15 NOTE — PROGRESS NOTES
Pre procedure Diagnosis: SI joint dysfunction    Post procedure Diagnosis: Same  Procedure performed: Right SI joint injection  Anesthesia: none  Complications: none  Operators: Ashley Beavers MD and Andrew Dunn DO    Indications:   Marina Valdez is a 67 year old female was sent by Dr. Lobo for right SI injection.  They have a history of low back pain radiating down the posterior right thigh.  Exam shows positive PSIS tenderness and positive MESFIN on the right. and they have tried conservative treatment including oral medications.    Options/alternatives, benefits and risks were discussed with the patient including bleeding, infection, tissue trauma, exposure to radiation, reaction to medications including seizure, nerve injury, weakness, and numbness.  Questions were answered to her satisfaction and she agrees to proceed. Voluntary informed consent was obtained and signed.     Vitals were reviewed: Yes  Allergies were reviewed:  Yes   Medications were reviewed:  Yes   Pre-procedure pain score: 7/10    Procedure:  After getting informed consent, patient was brought into the procedure suite and was placed in a prone position on the procedure table.   A Pause for the Cause was performed.  Patient was prepped and draped in sterile fashion.     After identifying the right SI joint, the C-arm was rotated to a obliquely to obtain the best view of the inferior angle of the joint.  A total of 3 ml of Lidocaine 1%  was used to anesthetize the skin at a skin entry site coaxial with the fluoroscopy beam at this location.  A 22gauge 5 inch needle was advanced under intermittent fluoroscopy until it was felt to enter the SI joint.    A total of 1ml of Omnipaque-300 was injected, confirming appropriate position, with spread into the intraarticular space, with no intravascular uptake noted.  9ml was wasted. Location was verified in lateral view.    2 ml of 0.2% ropivacaine with 30mg of kenalog was injected.  The needle  was flushed with lidocaine and removed.     Hemostasis was achieved, the area was cleaned, and bandaids were placed when appropriate.  The patient tolerated the procedure well, and was taken to the recovery room.    Images were saved to PACS.      Patient reported feeling decreased pain after the procedure.    Follow-up includes:   -f/u phone call in one week  -f/u with referring provider  -patient advised to monitor blood sugars very closely, call PCP above 300, and given instructions on how to reach out to them on nights/weekends.    Ashley Beavers MD  [facility]

## 2018-02-15 NOTE — PATIENT INSTRUCTIONS
If you have issues with your blood sugars the nurse line to call for [place] is:  [phone]    [facility]   Procedure Discharge Instructions    Today you saw: Dr. Mariam Beavers      You had an:    sacro-iliac joint injection        Medications used:  Lidocaine    Omnipaque  Ropivicaine   Kenalog             Be cautious when walking. Numbness and/or weakness in the lower extremities may occur for up to 6-8 hours after the procedure due to effect of the local anesthetic    Do not drive for 6 hours. The effect of the local anesthetic could slow your reflexes.     You may resume your regular activities after 24 hours    Avoid strenuous activity for the first 24 hours    You may shower, however avoid swimming, tub baths or hot tubs for 24 hours following your procedure    You may have a mild to moderate increase in pain for several days following the injection.    It may take up to 14 days for the steroid medication to start working although you may feel the effect as early as a few days after the procedure.       You may use ice packs for 10-15 minutes, 3 to 4 times a day at the injection site for comfort    Do not use heat to painful areas for 6 to 8 hours. This will give the local anesthetic time to wear off and prevent you from accidentally burning your skin.     You may use anti-inflammatory medications (such as Ibuprofen or Aleve or Advil) or Tylenol for pain control if necessary    If you were fasting, you may resume your normal diet and medications after the procedure    If you have diabetes, check your blood sugar more frequently than usual as your blood sugar may be higher than normal for 10-14 days following a steroid injection. Contact your doctor who manages your diabetes if your blood sugar is higher than usual    If you experience any of the following, call the pain center nursing line during work hours at [phone] or the after hours provider line at [phone]:  -Fever  over 100 degree F  -Swelling, bleeding, redness, drainage, warmth at the injection site  -Progressive weakness or numbness in your legs or arms  -Loss of bowel or bladder function  -Unusual headache that is not relieved by Tylenol or other pain reliever  -Unusual new onset of pain that is not improving      Phone #s:  Appointment line: [phone];  Nurse line: [phone]

## 2018-02-15 NOTE — NURSING NOTE
"Chief Complaint   Patient presents with     Pain       Initial /74  Pulse 116  LMP 03/24/2010 Estimated body mass index is 55.32 kg/(m^2) as calculated from the following:    Height as of 1/25/18: 1.63 m (5' 4.17\").    Weight as of 1/25/18: 147 kg (324 lb).  Medication Reconciliation: complete     Pre-procedure Intake    Have you been fasting? NA    If yes, for how long? No     Are you taking a prescribed blood thinner such as coumadin, Plavix, Xarelto?    No    If yes, when did you take your last dose? No     Do you take aspirin?  Yes     If cervical procedure, have you held aspirin for 6 days?   No     Do you have any allergies to contrast dye, iodine, steroid and/or numbing medications?  NO    Are you currently taking antibiotics or have an active infection?  NO    Have you had a fever/elevated temperature within the past week? NO    Are you currently taking oral steroids? NO    Do you have a ? Yes       Are you pregnant or breastfeeding?  NO    Are the vital signs normal?  Yes    Yoel Genao MA            "

## 2018-02-15 NOTE — NURSING NOTE
Discharge Information    IV Discontiued Time:  NA    Amount of Fluid Infused:  NA    Discharge Criteria = When patient returns to baseline or as per MD order    Consciousness:  Pt is fully awake    Circulation:  BP +/- 20% of pre-procedure level    Respiration:  Patient is able to breathe deeply    O2 Sat:  Patient is able to maintain O2 Sat >92% on room air    Activity:  Moves 4 extremities on command    Ambulation:  Patient is able to stand and walk or stand and pivot into wheelchair    Dressing:  Clean/dry or No Dressing    Notes:   Discharge instructions and AVS given to patient    Patient meets criteria for discharge?  YES    Admitted to PCU?  No    Responsible adult present to accompany patient home?  Yes    Signature/Title:    se turcios RN Care Coordinator  [facility]

## 2018-02-16 ENCOUNTER — MEDICAL CORRESPONDENCE (OUTPATIENT)
Dept: HEALTH INFORMATION MANAGEMENT | Facility: CLINIC | Age: 68
End: 2018-02-16

## 2018-02-22 ENCOUNTER — TELEPHONE (OUTPATIENT)
Dept: RADIOLOGY | Facility: CLINIC | Age: 68
End: 2018-02-22

## 2018-02-23 ENCOUNTER — TELEPHONE (OUTPATIENT)
Dept: FAMILY MEDICINE | Facility: CLINIC | Age: 68
End: 2018-02-23

## 2018-02-23 NOTE — TELEPHONE ENCOUNTER
Patient updated with clarified dose. See below  Patient verbalized understanding.      Per 1/25/18 Phone encounter:

## 2018-02-23 NOTE — TELEPHONE ENCOUNTER
Reason for Call:  Other prescription    Detailed comments: Patient is wondering if she should take 110 or 96ml Toujeo as her nurse is wondering. Please call to discuss.     Phone Number Patient can be reached at: Home number on file [phone] (home)    Best Time: any    Can we leave a detailed message on this number? YES    Call taken on 2/23/2018 at 3:17 PM by Jhonny Abdalla

## 2018-03-11 ENCOUNTER — NURSE TRIAGE (OUTPATIENT)
Dept: NURSING | Facility: CLINIC | Age: 68
End: 2018-03-11

## 2018-03-11 ENCOUNTER — TELEPHONE (OUTPATIENT)
Dept: INTERNAL MEDICINE | Facility: CLINIC | Age: 68
End: 2018-03-11

## 2018-03-11 NOTE — TELEPHONE ENCOUNTER
"Patient states she 'felt a bump in her nostril and, thinking it was a booger, picked it.\"  She pulled out a 7 inch blood clot, \"it looked like a vein.\"    She had a similar episode 3 days ago, although, the string was only 3 inches long.  Denies fever or facial/nose injury.  No further nasal bleeding, but feels something is running down the back of her throat.  Requested that she spit.  She states the spit is yellowish with pink.      Protocol and care advice reviewed  Caller states understanding of the recommended disposition.  Advised patient to see PCP this week. Patient is agreeable to this plan and is transferred to scheduling to make an appointment.    Advised to call back if further questions or concerns      Reason for Disposition    Easy bleeding present in other family members    Additional Information    Negative: Fainted or too weak to stand following large blood loss    Negative: Sounds like a life-threatening emergency to the triager    Negative: Nosebleed followed a nose injury    Negative: [1] Bleeding present > 30 minutes AND [2] using correct method of direct pressure    Negative: [1] Bleeding now AND [2] second call after being instructed in correct technique of direct pressure    Negative: Dizziness or lightheadedness    Negative: Pale skin (pallor) of new onset or worsening    Negative: [1] Has nasal packing (inserted by health care provider to control bleeding) AND [2] new rash    Negative: [1] Has nasal packing AND [2] now bleeding around the packing (Exception: few drops or ooze)    Negative: Patient sounds very sick or weak to the triager    Negative: [1] Bleeding recurs 3 or more times in 24 hours AND [2] direct pressure applied correctly    Negative: [1] Skin bruises or bleeding gums AND [2] not caused by an injury    Negative: [1] Large amount of blood has been lost (e.g., 1 cup or 240 ml) AND [2] bleeding now controlled (stopped)    Negative: [1] Has nasal packing AND [2] fever > 100.5 F " (38.1 C)    Negative: Taking Coumadin (warfarin) or other strong blood thinner, or known bleeding disorder (e.g., thrombocytopenia)    Negative: Has nasal packing (inserted by health care provider to control bleeding)    Negative: Hard-to-stop nosebleeds are a chronic symptom (recurrent or ongoing AND present > 4 weeks)    Protocols used: NOSEBLEED-ADULT-AH

## 2018-03-12 ENCOUNTER — OFFICE VISIT (OUTPATIENT)
Dept: FAMILY MEDICINE | Facility: CLINIC | Age: 68
End: 2018-03-12
Payer: COMMERCIAL

## 2018-03-12 VITALS
HEART RATE: 130 BPM | SYSTOLIC BLOOD PRESSURE: 132 MMHG | WEIGHT: 293 LBS | DIASTOLIC BLOOD PRESSURE: 80 MMHG | OXYGEN SATURATION: 95 % | HEIGHT: 64 IN | BODY MASS INDEX: 50.02 KG/M2 | RESPIRATION RATE: 12 BRPM | TEMPERATURE: 99 F

## 2018-03-12 DIAGNOSIS — R04.0 EPISTAXIS: Primary | ICD-10-CM

## 2018-03-12 PROCEDURE — 99213 OFFICE O/P EST LOW 20 MIN: CPT | Performed by: INTERNAL MEDICINE

## 2018-03-12 ASSESSMENT — PAIN SCALES - GENERAL: PAINLEVEL: NO PAIN (0)

## 2018-03-12 NOTE — MR AVS SNAPSHOT
After Visit Summary   3/12/2018    Marina Valdez    MRN: [id number]           Patient Information     Date Of Birth          1950        Visit Information        Provider Department      3/12/2018 4:30 PM Mateus Roberson MD [facility]        Today's Diagnoses     Epistaxis    -  1      Care Instructions    [facility]    If you have any questions regarding to your visit please contact your care team:     Team Pink:   Clinic Hours Telephone Number   Internal Medicine:  Dr. Jeanie Decker NP       7am-7pm  Monday - Thursday   7am-5pm  Fridays  [phone]  (Appointment scheduling available 24/7)    Questions about your visit?  Team Line  [phone]   Urgent Care - [place] and [place] - 11am-9pm Monday-Friday Saturday-Sunday- 9am-5pm   [place] - 5pm-9pm Monday-Friday Saturday-Sunday- 9am-5pm  [phone] - [place]   [phone] - [place]       What options do I have for visits at the clinic other than the traditional office visit?  To expand how we care for you, many of our providers are utilizing electronic visits (e-visits) and telephone visits, when medically appropriate, for interactions with their patients rather than a visit in the clinic.   We also offer nurse visits for many medical concerns. Just like any other service, we will bill your insurance company for this type of visit based on time spent on the phone with your provider. Not all insurance companies cover these visits. Please check with your medical insurance if this type of visit is covered. You will be responsible for any charges that are not paid by your insurance.      E-visits via [employer].:  generally incur a $35.00 fee.  Telephone visits:  Time spent on the phone: *charged based on time that is spent on the phone in increments of 10 minutes. Estimated cost:   5-10 mins $30.00   11-20 mins. $59.00   21-30 mins. $85.00   Use [employer].  "(secure email communication and access to your chart) to send your primary care provider a message or make an appointment. Ask someone on your Team how to sign up for [employer] Plan.    For a Price Quote for your services, please call our Consumer Price Line at [phone].    As always, Thank you for trusting us with your health care needs!      Discharged by Stacey CROFT CMA ([facility])            Follow-ups after your visit        Your next 10 appointments already scheduled     Mar 28, 2018  1:30 PM CDT   Diabetic Education with  DIABETIC ED RESOURCE   [facility] [place] ([facility])    [address] [address] [phone]              Who to contact     If you have questions or need follow up information about today's clinic visit or your schedule please contact [facility] directly at [phone].  Normal or non-critical lab and imaging results will be communicated to you by Nexalogy[employer], letter or phone within 4 business days after the clinic has received the results. If you do not hear from us within 7 days, please contact the clinic through [employer] or phone. If you have a critical or abnormal lab result, we will notify you by phone as soon as possible.  Submit refill requests through [employer] Plan or call your pharmacy and they will forward the refill request to us. Please allow 3 business days for your refill to be completed.          Additional Information About Your Visit        [employer] Business Plan Information     [employer] Plan lets you send messages to your doctor, view your test results, renew your prescriptions, schedule appointments and more. To sign up, go to [email or web address] . Click on \"Log in\" on the left side of the screen, which will take you to the Welcome page. Then click on \"Sign up Now\" on the right side of the page.     You will be asked to enter the access code listed below, as well as some personal information. Please follow the directions to create " "your username and password.     Your access code is: [email or web address]  Expires: 2018  4:01 PM     Your access code will  in 90 days. If you need help or a new code, please call your [facility] clinic or [phone].        Care EveryWhere ID     This is your Care EveryWhere ID. This could be used by other organizations to access your [facility] medical records  [id number]        Your Vitals Were     Pulse Temperature Respirations Height Last Period Pulse Oximetry    130 99  F (37.2  C) (Oral) 12 5' 4.17\" (1.63 m) 2010 95%    BMI (Body Mass Index)                   56.17 kg/m2            Blood Pressure from Last 3 Encounters:   18 132/80   02/15/18 135/56   18 132/72    Weight from Last 3 Encounters:   18 (!) 329 lb (149.2 kg)   18 (!) 324 lb (147 kg)   17 (!) 323 lb 6.4 oz (146.7 kg)              Today, you had the following     No orders found for display       Primary Care Provider Office Phone # Fax #    Mateus Roberson [phone] [address] [address]        Equal Access to Services     [facility] AH: Hadii omari ku hadasho Soomaali, waaxda luqadaha, qaybta kaalmada adeegyada, sanjana ontiveros hayadrien vega . So [facility] [phone].    ATENCIÓN: Si habla español, tiene a garza disposición servicios gratuitos de asistencia lingüística. Llame al [phone].    We comply with applicable federal civil rights laws and [place] laws. We do not discriminate on the basis of race, color, national origin, age, disability, sex, sexual orientation, or gender identity.            Thank you!     Thank you for choosing [facility]  for your care. Our goal is always to provide you with excellent care. Hearing back from our patients is one way we can continue to improve our services. Please take a few minutes to complete the written survey that you may receive in the mail after your visit with us. Thank you!             Your " Updated Medication List - Protect others around you: Learn how to safely use, store and throw away your medicines at www.disposemymeds.org.          This list is accurate as of 3/12/18  4:53 PM.  Always use your most recent med list.                   Brand Name Dispense Instructions for use Diagnosis    acetaminophen 500 MG tablet    TYLENOL     Take 500 mg by mouth        aspirin 81 MG tablet     100 tablet    Take 1 tablet (81 mg) by mouth daily    Type 2 diabetes, HbA1C goal < 8% (H)       B-D U/F 31G X 8 MM   Generic drug:  insulin pen needle     200 each    USE FOUR TIMES DAILY AS DIRECTED WITH LANTUS AND PREMEAL INSULIN    Uncontrolled type 2 diabetes mellitus with stage 3 chronic kidney disease, with long-term current use of insulin (H)       BENADRYL 25 MG tablet   Generic drug:  diphenhydrAMINE      Take 25 mg by mouth At Bedtime And 25 mg nightly as needed        blood glucose monitoring lancets     300 each    Use to test blood sugars 3 times daily or as directed. Qty of 1 box= 100 lancets    Uncontrolled type 2 diabetes mellitus with stage 3 chronic kidney disease, with long-term current use of insulin (H)       * blood glucose monitoring test strip    no brand specified    300 strip    Use to test blood sugars 3 times daily or as directed    Uncontrolled type 2 diabetes mellitus with stage 3 chronic kidney disease, with long-term current use of insulin (H)       * blood glucose monitoring test strip    ONETOUCH VERIO IQ    100 strip    TEST THREE TIMES DAILY OR AS DIRECTED    Uncontrolled type 2 diabetes mellitus with stage 3 chronic kidney disease, with long-term current use of insulin (H)       cholecalciferol 1000 UNITS Tabs      Take 1 tablet by mouth daily        GEODON 80 MG capsule   Generic drug:  ziprasidone      Take 160 mg by mouth every morning And 80 mg daily at 10 AM, and 80 mg nightly as needed        hydrocortisone valerate 0.2 % ointment    WEST-JOE    15 g    Apply sparingly to  affected area three times daily for 14 days.    Eczema, unspecified type, Compulsive skin picking, Impetigo       insulin glargine U-300 300 UNIT/ML injection    TOUJEO SOLOSTAR    30 mL    Inject 100 Units Subcutaneous At Bedtime    Uncontrolled type 2 diabetes mellitus with stage 3 chronic kidney disease, with long-term current use of insulin (H)       insulin lispro 100 UNIT/ML injection    HumaLOG KWIKpen    3 mL    Inject 15 Units Subcutaneous 3 times daily (before meals)    Uncontrolled type 2 diabetes mellitus with stage 3 chronic kidney disease, with long-term current use of insulin (H)       nystatin 590832 UNIT/GM Powd    NYSTOP    15 g    APPLY TO GROIN AND UNDER BREAST TWICE DAILY UNTIL REDNESS HAS RESOLVED.    Candidal intertrigo       order for DME     1 Device    Equipment being ordered: CPAP and associated supplies and tubing    Obstructive sleep apnea       * order for DME     1 each    Equipment being ordered: Lift Chair    Morbid obesity due to excess calories (H), Chronic bilateral low back pain without sciatica       * order for DME     1 Device    Equipment being ordered: corset to prevent compulsive skin picking of abdomen    Compulsive skin picking       * order for DME     1 Device    Equipment being ordered: CPAP machine set at 15 pressure, heated humidifier, supplies: mask and tubing ( supplies)    Obstructive sleep apnea       * order for DME     1 Device    Equipment being ordered: CPAP    Obstructive sleep apnea       * order for DME     1 Device    Equipment being ordered: home blood pressure cuff for home blood pressure monitoring    Hypertension goal BP (blood pressure) < 140/90       PARoxetine 40 MG tablet    PAXIL     Take 80 mg by mouth daily. 80mg=2 tablets.        pravastatin 40 MG tablet    PRAVACHOL    90 tablet    Take 1 tablet (40 mg) by mouth daily    Hyperlipidemia LDL goal <100       senna-docusate 8.6-50 MG per tablet    SENOKOT-S;PERICOLACE     Take 2 tablets by mouth  2 times daily as needed for constipation        traMADol 50 MG tablet    ULTRAM    60 tablet    TAKE ONE TABLET BY MOUTH TWICE DAILY AS NEEDED FOR MODERATE PAIN    Chronic bilateral low back pain without sciatica       valACYclovir 500 MG tablet    VALTREX    90 tablet    Take 1 tablet (500 mg) by mouth daily    Recurrent genital HSV (herpes simplex virus) infection       valsartan-hydrochlorothiazide 160-25 MG per tablet    DIOVAN-HCT    90 tablet    Take 1 tablet by mouth daily    Uncontrolled type 2 diabetes mellitus with stage 3 chronic kidney disease, with long-term current use of insulin (H)       * Notice:  This list has 7 medication(s) that are the same as other medications prescribed for you. Read the directions carefully, and ask your doctor or other care provider to review them with you.

## 2018-03-12 NOTE — PROGRESS NOTES
SUBJECTIVE:   Marina Valdez is a 67 year old female who presents to clinic today for the following health issues:    Patient presents to clinic today for epistaxis.    Yesterday, she pulled a blood clot out of her left nostril that was 7 inches long. It was liquidy and looked like there were clots in it. She still feels like there is drainage in the back of her throat and does not know if it is blood or sinus drainage. A week ago, she had another 3 inch blood clot but no hx of epistaxis before that. The nurse told her not to blow her nose. She also had a bleeding sore on her chest that was not clotting for a long time even after putting pressure on it.    Additional notes:  The therapeutic injection for her SI joint was effective.      Problem list and histories reviewed & adjusted, as indicated.  Additional history: as documented    Patient Active Problem List   Diagnosis     Hypertension goal BP (blood pressure) < 140/90     Recurrent genital HSV (herpes simplex virus) infection     Intertrigo     Ovarian Mass s/p excision in 2008     Sebaceous cyst     Microalbuminuria     Hyperlipidemia with target LDL less than 100     Chest pain at rest x1 episode- resolved at rest 3 hours, assoc with food     Obstructive sleep apnea (on cpap)     24 hour contact given to patient      Hydradenitis     CKD (chronic kidney disease) stage 3, GFR 30-59 ml/min     Compulsive skin picking     Need for prophylactic vaccination and inoculation against influenza     High risk OTC medication or supplement use     Rotator cuff tear     Morbid obesity due to excess calories (H)     Chronic bilateral low back pain without sciatica     Primary osteoarthritis of both hips     Schizophrenia, unspecified type (H)     Uncontrolled type 2 diabetes mellitus with stage 3 chronic kidney disease, with long-term current use of insulin (H)     Endometrial cancer, grade I (H)     Impetigo     Health Care Home     Past Surgical History:    Procedure Laterality Date     cataract      bilateral     CYSTOSCOPY N/A 12/29/2016    Procedure: CYSTOSCOPY;  Surgeon: Franca Rousseau MD;  Location: [facility] OR     DAVINCI HYSTERECTOMY TOTAL, BILATERAL SALPINGO-OOPHORECTOMY, COMBINED N/A 12/29/2016    Procedure: COMBINED DAVINCI HYSTERECTOMY TOTAL, SALPINGO-OOPHORECTOMY;  Surgeon: Franca Rousseau MD;  Location: [facility] OR     LAPAROSCOPY      for endometriosis     OOPHORECTOMY      right     WRIST SURGERY      right       Social History   Substance Use Topics     Smoking status: Former Smoker     Packs/day: 1.00     Years: 0.00     Types: Cigarettes     Quit date: 1/1/1990     Smokeless tobacco: Never Used      Comment: quit 1990     Alcohol use No     Family History   Problem Relation Age of Onset     HEART DISEASE Mother      DIABETES Mother      Hypertension Mother      Psychotic Disorder Mother      schitzophrenia     Hypertension Father      Blood Disease Father      high iron level     HEART DISEASE Maternal Grandmother      CEREBROVASCULAR DISEASE Maternal Grandmother      HEART DISEASE Maternal Grandfather      Breast Cancer Paternal Grandmother      Breast Cancer Sister      HEART DISEASE Brother      Ovarian Cancer Paternal Aunt      Kidney Cancer Daughter      Uterine Cancer Daughter      Uterine Cancer Sister          Current Outpatient Prescriptions   Medication Sig Dispense Refill     insulin glargine U-300 (TOUJEO SOLOSTAR) 300 UNIT/ML injection Inject 100 Units Subcutaneous At Bedtime 30 mL 0     valsartan-hydrochlorothiazide (DIOVAN-HCT) 160-25 MG per tablet Take 1 tablet by mouth daily 90 tablet 1     traMADol (ULTRAM) 50 MG tablet TAKE ONE TABLET BY MOUTH TWICE DAILY AS NEEDED FOR MODERATE PAIN 60 tablet 0     valACYclovir (VALTREX) 500 MG tablet Take 1 tablet (500 mg) by mouth daily 90 tablet 1     insulin lispro (HUMALOG KWIKPEN) 100 UNIT/ML injection Inject 15 Units Subcutaneous 3 times daily (before meals) 3 mL 3     nystatin  (NYSTOP) 854925 UNIT/GM POWD APPLY TO GROIN AND UNDER BREAST TWICE DAILY UNTIL REDNESS HAS RESOLVED. 15 g 1     order for DME Equipment being ordered: CPAP 1 Device 0     order for DME Equipment being ordered: home blood pressure cuff for home blood pressure monitoring 1 Device 0     blood glucose monitoring (ONETOUCH VERIO IQ) test strip TEST THREE TIMES DAILY OR AS DIRECTED 100 strip 3     order for DME Equipment being ordered: CPAP machine set at 15 pressure, heated humidifier, supplies: mask and tubing ( supplies) 1 Device 1     hydrocortisone valerate (WEST-JOE) 0.2 % ointment Apply sparingly to affected area three times daily for 14 days. 15 g 0     blood glucose monitoring (ONE TOUCH DELICA) lancets Use to test blood sugars 3 times daily or as directed. Qty of 1 box= 100 lancets 300 each 3     pravastatin (PRAVACHOL) 40 MG tablet Take 1 tablet (40 mg) by mouth daily 90 tablet 3     blood glucose monitoring (NO BRAND SPECIFIED) test strip Use to test blood sugars 3 times daily or as directed 300 strip 3     B-D U/F 31G X 8 MM insulin pen needle USE FOUR TIMES DAILY AS DIRECTED WITH LANTUS AND PREMEAL INSULIN 200 each 3     acetaminophen (TYLENOL) 500 MG tablet Take 500 mg by mouth       diphenhydrAMINE (BENADRYL) 25 MG tablet Take 25 mg by mouth At Bedtime And 25 mg nightly as needed       cholecalciferol 1000 UNITS TABS Take 1 tablet by mouth daily       ziprasidone (GEODON) 80 MG capsule Take 160 mg by mouth every morning And 80 mg daily at 10 AM, and 80 mg nightly as needed       senna-docusate (SENOKOT-S;PERICOLACE) 8.6-50 MG per tablet Take 2 tablets by mouth 2 times daily as needed for constipation       order for DME Equipment being ordered: corset to prevent compulsive skin picking of abdomen 1 Device 0     order for DME Equipment being ordered: Lift Chair 1 each 0     aspirin 81 MG tablet Take 1 tablet (81 mg) by mouth daily 100 tablet 3     ORDER FOR DME Equipment being ordered: CPAP and associated  "supplies and tubing 1 Device 0     PAROXETINE HCL 40 MG OR TABS Take 80 mg by mouth daily. 80mg=2 tablets.        Labs reviewed in EPIC    Reviewed and updated as needed this visit by clinical staff  Tobacco  Allergies  Meds  Med Hx  Surg Hx  Fam Hx  Soc Hx      Reviewed and updated as needed this visit by Provider         ROS:  Constitutional, HEENT, cardiovascular, pulmonary, GI, , musculoskeletal, neuro, skin, endocrine and psych systems are negative, except as otherwise noted.    This document serves as a record of the services and decisions personally performed and made by Mateus Roberson MD. It was created on his/her behalf by Lisbet Valentino, trained medical scribe. The creation of this document is based the provider's statements to the medical scribes.    Scribe Lisbet Valentino 4:46 PM, March 12, 2018    OBJECTIVE:     /80  Pulse 130  Temp 99  F (37.2  C) (Oral)  Resp 12  Ht 1.63 m (5' 4.17\")  Wt (!) 149.2 kg (329 lb)  LMP 03/24/2010  SpO2 95%  BMI 56.17 kg/m2  Body mass index is 56.17 kg/(m^2).  GENERAL: healthy, alert and no distress  MOUTH: mouth without ulcers or lesions, no visible blood in the back of her throat  NOSE: glistening moist membranes with no blood, no polyps or inflammation  NEURO: Normal strength and tone, mentation intact and speech normal  PSYCH: mentation appears normal, affect normal/bright    ASSESSMENT/PLAN:   1. Epistaxis  There's no current bleeding and patient was most concerned if she was having continued bleeding down the back of the throat . I see no evidence for this. Basically this seems to be a this is a self-limited problem and I strongly encouraged patient to avoid any nose picking and keep a vaporizer by the bed stand and lets keep an eye on things. Consider Ear, Nose, and Throat specialist consultation if persistent or recurrent epistaxis       Patient Instructions     [facility]    If you have any questions regarding to your visit please contact " your care team:     Team Pink:   Clinic Hours Telephone Number   Internal Medicine:  Dr. Jeanie Decker, NP       7am-7pm  Monday - Thursday   7am-5pm  Fridays  [phone]  (Appointment scheduling available 24/7)    Questions about your visit?  Team Line  [phone]   Urgent Care - [place] - 11am-9pm Monday-Friday Saturday-Sunday- 9am-5pm   [place] - 5pm-9pm Monday-Friday Saturday-Sunday- 9am-5pm  [phone] - [facility]  [phone] - [place]       What options do I have for visits at the clinic other than the traditional office visit?  To expand how we care for you, many of our providers are utilizing electronic visits (e-visits) and telephone visits, when medically appropriate, for interactions with their patients rather than a visit in the clinic.   We also offer nurse visits for many medical concerns. Just like any other service, we will bill your insurance company for this type of visit based on time spent on the phone with your provider. Not all insurance companies cover these visits. Please check with your medical insurance if this type of visit is covered. You will be responsible for any charges that are not paid by your insurance.      E-visits via [employer]:  generally incur a $35.00 fee.  Telephone visits:  Time spent on the phone: *charged based on time that is spent on the phone in increments of 10 minutes. Estimated cost:   5-10 mins $30.00   11-20 mins. $59.00   21-30 mins. $85.00   Use [employer] (secure email communication and access to your chart) to send your primary care provider a message or make an appointment. Ask someone on your Team how to sign up for [employer].    For a Price Quote for your services, please call our Consumer Price Line at [phone].    As always, Thank you for trusting us with your health care needs!      Discharged by Stacey CROFT CMA ([facility])      The information in this document, created by the  medical scribeLisbet, for me, accurately reflects the services I personally performed and the decisions made by me. I have reviewed and approved this document for accuracy prior to leaving the patient care area.    Mateus Roberson MD  [facility]

## 2018-03-12 NOTE — PATIENT INSTRUCTIONS
[facility]    If you have any questions regarding to your visit please contact your care team:     Team Pink:   Clinic Hours Telephone Number   Internal Medicine:  Dr. Jeanie Decker NP       7am-7pm  Monday - Thursday   7am-5pm  Fridays  [phone]  (Appointment scheduling available 24/7)    Questions about your visit?  Team Line  [phone]   Urgent Care - [place] [facility] and [facility] - 11am-9pm Monday-Friday Saturday-Sunday- 9am-5pm   [place] - 5pm-9pm Monday-Friday Saturday-Sunday- 9am-5pm  [phone] - [place]   [phone] - [place]       What options do I have for visits at the clinic other than the traditional office visit?  To expand how we care for you, many of our providers are utilizing electronic visits (e-visits) and telephone visits, when medically appropriate, for interactions with their patients rather than a visit in the clinic.   We also offer nurse visits for many medical concerns. Just like any other service, we will bill your insurance company for this type of visit based on time spent on the phone with your provider. Not all insurance companies cover these visits. Please check with your medical insurance if this type of visit is covered. You will be responsible for any charges that are not paid by your insurance.      E-visits via iHeart:  generally incur a $35.00 fee.  Telephone visits:  Time spent on the phone: *charged based on time that is spent on the phone in increments of 10 minutes. Estimated cost:   5-10 mins $30.00   11-20 mins. $59.00   21-30 mins. $85.00   Use Brandkidst (secure email communication and access to your chart) to send your primary care provider a message or make an appointment. Ask someone on your Team how to sign up for iHeart.    For a Price Quote for your services, please call our Consumer Price Line at [phone].    As always, Thank you for trusting us with your health care  needs!      Discharged by Stacey CROFT CMA ([facility])

## 2018-03-12 NOTE — TELEPHONE ENCOUNTER
Please see other encounter. Pt has an appt scheduled for today with PCP.    Stacey Alexander RN  [facility]

## 2018-03-20 ENCOUNTER — TELEPHONE (OUTPATIENT)
Dept: INTERNAL MEDICINE | Facility: CLINIC | Age: 68
End: 2018-03-20

## 2018-03-20 DIAGNOSIS — B37.2 CANDIDAL INTERTRIGO: ICD-10-CM

## 2018-03-21 RX ORDER — NYSTATIN 100000 [USP'U]/G
POWDER TOPICAL
Qty: 15 G | Refills: 1 | Status: SHIPPED | OUTPATIENT
Start: 2018-03-21 | End: 2018-07-18

## 2018-03-21 NOTE — TELEPHONE ENCOUNTER
Reason for Call:  Other prescription  For nystatin (NYSTOP) [id number] UNIT/GM POWD    Detailed comments: Patient is requesting a larger bottle because it gets used up too fast.    Phone Number Patient can be reached at: Other phone number:  [phone] [facility]*    Best Time: anytime    Can we leave a detailed message on this number? YES    Call taken on 3/21/2018 at 1:17 PM by Radha Cerrato

## 2018-03-21 NOTE — TELEPHONE ENCOUNTER
Spoke with pharmacist and confirmed OK for largest bottle they had.   [facility] pharmacist confirmed and will change order for larger bottle.   No questions at this time.  Karen Ramesh RN

## 2018-03-21 NOTE — TELEPHONE ENCOUNTER
I don't think they make large bottles of this.  But ok for the largest bottle available.  Dr. Peralta

## 2018-03-21 NOTE — TELEPHONE ENCOUNTER
"Prescription approved per [facility] Refill Protocol.  Requested Prescriptions   Pending Prescriptions Disp Refills     nystatin (NYSTOP) [id number] UNIT/GM POWD 15 g 1     Sig: APPLY TO GROIN AND UNDER BREAST TWICE DAILY UNTIL REDNESS HAS RESOLVED.    Antifungal Agents Passed    3/20/2018 10:56 AM       Passed - Recent (12 mo) or future (30 days) visit within the authorizing provider's specialty    Patient had office visit in the last 12 months or has a visit in the next 30 days with authorizing provider or within the authorizing provider's specialty.  See \"Patient Info\" tab in inbasket, or \"Choose Columns\" in Meds & Orders section of the refill encounter.           Passed - Not Fluconazole or Terconazole     If oral Fluconazole or Terconazole, may refill if indicated in progress notes.           Karen Ramesh RN    "

## 2018-04-04 ENCOUNTER — PATIENT OUTREACH (OUTPATIENT)
Dept: GERIATRIC MEDICINE | Facility: CLINIC | Age: 68
End: 2018-04-04

## 2018-04-04 NOTE — PROGRESS NOTES
[facility] Six-Month Telephone Assessment    6 month telephone assessment completed on 4/4/18.    ER visits: No  Hospitalizations: No  TCU stays: No  Significant health status changes: none reported. Continues with back pain - seeing sports medicine doctor now. Working with PCP on getting better mgmt of her diabetes.  Falls/Injuries: No  ADL/IADL changes: No  Changes in services: No, continues with services through her CADI waiver    Caregiver Assessment follow up:  n/a    Goals: See POC in chart for goal progress documentation.     Will see member in 6 months for an annual health risk assessment. Encouraged member to call CC with any questions or concerns in the meantime.     GAVIOTA Blackwell  [facility]   [phone]

## 2018-04-05 ENCOUNTER — MEDICAL CORRESPONDENCE (OUTPATIENT)
Dept: HEALTH INFORMATION MANAGEMENT | Facility: CLINIC | Age: 68
End: 2018-04-05

## 2018-04-14 ENCOUNTER — NURSE TRIAGE (OUTPATIENT)
Dept: NURSING | Facility: CLINIC | Age: 68
End: 2018-04-14

## 2018-04-14 ENCOUNTER — TELEPHONE (OUTPATIENT)
Dept: FAMILY MEDICINE | Facility: CLINIC | Age: 68
End: 2018-04-14

## 2018-04-14 DIAGNOSIS — B37.2 CANDIDAL INTERTRIGO: Primary | ICD-10-CM

## 2018-04-14 RX ORDER — FLUCONAZOLE 100 MG/1
TABLET ORAL
Qty: 7 TABLET | Refills: 2 | Status: SHIPPED | OUTPATIENT
Start: 2018-04-14 | End: 2019-02-04

## 2018-04-14 NOTE — TELEPHONE ENCOUNTER
"\"I have an yeast infection under my arm and my left breast.\" Symptoms starting 3 days with tender raised tiny red bumps under breast and left arm, spreading down left arm. Afebrile. \"It smells bad I know it is yeast.\" Patient has been applying topical Nystatin.   Paged on call Dr Dumont through [employer] at 950 a.m. To call Pamela at  [phone]. Dr Dumont advised Diflucan 100 mg x 1 per day x 7 days. 2 refills.  Patient notified.Caller verbalized understanding. Denies further questions.    Pamela Salgado RN  [facility]          "

## 2018-04-14 NOTE — TELEPHONE ENCOUNTER
Reason for Disposition    [1] Looks infected (spreading redness, pus) AND [2] large red area (> 2 in. or 5 cm)    Additional Information    Negative: [1] Sudden onset of rash (within last 2 hours) AND [2] difficulty with breathing or swallowing    Negative: Sounds like a life-threatening emergency to the triager    Negative: Poison ivy, oak, or sumac contact suspected    Negative: Insect bite(s) suspected    Negative: Ringworm suspected (i.e., round pink patch, sometimes looks like ring, usually 1/2 to 1 inch [12-25 mm],  in size, slowly increasing in size)    Negative: Athlete's Foot suspected (i.e., itchy rash between the toes)    Negative: Jock Itch suspected (i.e., itchy rash on inner thighs near genital area)    Negative: Wound infection suspected (i.e., pain, spreading redness, or pus; in a cut, puncture, scrape or sutured wound)    Negative: Impetigo suspected  (i.e., painless infected superficial small sores, less than 1 inch or 2.5 cm, often covered by a soft, yellow-brown scab or crust; sometimes occurring near nasal openings)    Negative: Shingles suspected (i.e., painful rash, multiple small blisters grouped together in one area of body; dermatomal distribution)    Negative: Rash of external female genital area (vulva)    Negative: Rash of penis or scrotum    Negative: Small spot, skin growth, or mole    Negative: Sores or skin ulcer, not a rash    Negative: Localized lump (or swelling) without redness or rash    Negative: [1] Localized purple or blood-colored spots or dots AND [2] not from injury or friction AND [3] fever    Negative: Patient sounds very sick or weak to the triager    Negative: [1] Red area or streak AND [2] fever    Negative: [1] Rash is painful to touch AND [2] fever    Protocols used: RASH OR REDNESS - LOCALIZED-ADULT-

## 2018-04-14 NOTE — TELEPHONE ENCOUNTER
"\"I have an yeast infection under my arm and my left breast.\" Symptoms starting 3 days with tender raised tiny red bumps under breast and left arm, spreading down left arm. Afebrile. \"It smells bad I know it is yeast.\" Patient has been applying topical Nystatin.   Paged on call Dr Dumont through [employer] at 950 a.m. To call Pamela at  [phone]. Dr Dumont advised Diflucan 100 mg x 1 per day x 7 days. 2 refills.  Patient notified.Caller verbalized understanding. Denies further questions.    Pamela Salgado RN  [facility]          "

## 2018-04-17 ENCOUNTER — TELEPHONE (OUTPATIENT)
Dept: INTERNAL MEDICINE | Facility: CLINIC | Age: 68
End: 2018-04-17

## 2018-04-17 NOTE — TELEPHONE ENCOUNTER
Please advise on stool softener and CKD.     Patient is asking if there is any interaction with CKD and OTC stool softeners?     Please advise.  Karen Ramesh RN

## 2018-04-17 NOTE — TELEPHONE ENCOUNTER
Reason for Call:  Other call back    Detailed comments: Patient wants to know if she can take a stool softer with her kidney disease    Phone Number Patient can be reached at: Home number on file [phone] (home)    Best Time: Any    Can we leave a detailed message on this number? YES    Call taken on 4/17/2018 at 12:53 PM by Mendy Haro

## 2018-04-24 ENCOUNTER — TELEPHONE (OUTPATIENT)
Dept: FAMILY MEDICINE | Facility: CLINIC | Age: 68
End: 2018-04-24

## 2018-04-24 NOTE — LETTER
April 24, 2018          Marina Valdez,  [address]    [place] [facility] [address]        Dear Marina Valdez      Monitoring and managing your preventative and chronic health conditions are very important to us. Our records indicate that you have not scheduled for Diabetic Check and HgbA1C  which was recommended by Dr. Roberson.      If you have received your health care elsewhere, please call the clinic so the information can be documented in your chart.    Please call [phone] or message us through your Grokr account to schedule an appointment or provide information for your chart.     Feel free to contact us if you have any questions or concerns!    I look forward to seeing you and working with you on your health care needs.     Sincerely,         Mateus Roberson / Suzy POWELL CMA ([facility])

## 2018-04-26 ENCOUNTER — TELEPHONE (OUTPATIENT)
Dept: INTERNAL MEDICINE | Facility: CLINIC | Age: 68
End: 2018-04-26

## 2018-04-26 NOTE — TELEPHONE ENCOUNTER
Patient is correct. Please notify patient of this information.     Sorry for the confusion. We will see her in June     Mateus Roberson MD

## 2018-04-26 NOTE — TELEPHONE ENCOUNTER
Reason for Call:  Other call back    Detailed comments: patient received a letter in the mail stating she needs to come in for a diabetes check. Patient states she was told by PCP that she does not need to come in until June. Patient asking for a return call to discuss further.    Phone Number Patient can be reached at: Home number on file [phone] (home)    Best Time: any time    Can we leave a detailed message on this number? YES    Call taken on 4/26/2018 at 4:06 PM by Michelle Quinn

## 2018-04-27 NOTE — TELEPHONE ENCOUNTER
Called patient and informed of message below and will call back in June to make an appointment.     Suzy POWELL CMA ([facility])

## 2018-04-30 DIAGNOSIS — G89.29 CHRONIC BILATERAL LOW BACK PAIN WITHOUT SCIATICA: ICD-10-CM

## 2018-04-30 DIAGNOSIS — M54.50 CHRONIC BILATERAL LOW BACK PAIN WITHOUT SCIATICA: ICD-10-CM

## 2018-04-30 RX ORDER — TRAMADOL HYDROCHLORIDE 50 MG/1
TABLET ORAL
Qty: 60 TABLET | Refills: 0 | Status: SHIPPED | OUTPATIENT
Start: 2018-04-30 | End: 2018-05-04

## 2018-04-30 NOTE — TELEPHONE ENCOUNTER
[place] reviewed:  reviewed- no concerns    [place]:  [email or web address]    Nadia Calderon RN - BC

## 2018-04-30 NOTE — TELEPHONE ENCOUNTER
Controlled Substance Refill Request for traMADol (ULTRAM) 50 MG tablet  Problem List Complete:  No     PROVIDER TO CONSIDER COMPLETION OF PROBLEM LIST AND OVERVIEW/CONTROLLED SUBSTANCE AGREEMENT    Last Written Prescription Date:  1/25/2018  Last Fill Quantity: 60,   # refills: 0    Last Office Visit with [facility] primary care provider: 3/12/2018    Future Office visit:     Controlled substance agreement on file: No.     Processing:  NA   checked in past 6 months?  No, route to RN

## 2018-05-01 NOTE — TELEPHONE ENCOUNTER
Ultram prescription faxed to [facility] pharmacy at [phone].  Camila Rudolph,

## 2018-05-04 ENCOUNTER — TELEPHONE (OUTPATIENT)
Dept: INTERNAL MEDICINE | Facility: CLINIC | Age: 68
End: 2018-05-04

## 2018-05-04 DIAGNOSIS — M54.50 CHRONIC BILATERAL LOW BACK PAIN WITHOUT SCIATICA: ICD-10-CM

## 2018-05-04 DIAGNOSIS — G89.29 CHRONIC BILATERAL LOW BACK PAIN WITHOUT SCIATICA: ICD-10-CM

## 2018-05-04 RX ORDER — TRAMADOL HYDROCHLORIDE 50 MG/1
TABLET ORAL
Qty: 60 TABLET | Refills: 0 | Status: SHIPPED | OUTPATIENT
Start: 2018-05-04 | End: 2018-06-23

## 2018-05-04 NOTE — TELEPHONE ENCOUNTER
Reason for Call:  Other call back    Detailed comments: Please update patient on status of refill request. She states the pharmacy told her Dr. Roberson would not refill it.    Phone Number Patient can be reached at: Home number on file [phone] (home)    Best Time: ASAP    Can we leave a detailed message on this number? YES    Call taken on 5/4/2018 at 9:48 AM by Radha Cerrato

## 2018-05-04 NOTE — TELEPHONE ENCOUNTER
Spoke to pharmacy and informed them Rx is local print. They will reach out to pharmacy.  Stacey CROFT CMA ([facility])

## 2018-05-04 NOTE — TELEPHONE ENCOUNTER
Routing refill request to provider for review/approval because:  Drug not on the [facility] refill protocol   Clementine Figueredo RN

## 2018-05-04 NOTE — TELEPHONE ENCOUNTER
Spoke to pharmacy and they stated they did not receive faxed Rx. TC, please refax Rx to pharmacy.  Stacey CROFT CMA ([facility])

## 2018-05-07 ENCOUNTER — TELEPHONE (OUTPATIENT)
Dept: INTERNAL MEDICINE | Facility: CLINIC | Age: 68
End: 2018-05-07

## 2018-05-07 NOTE — TELEPHONE ENCOUNTER
Spoke to patient in regards to message below. Patient states understanding.      Emely Bernal, JACKIE

## 2018-05-07 NOTE — TELEPHONE ENCOUNTER
Reason for Call:  Other prescription    Detailed comments: patient states that she has looked up and down for the hard copy of the Tramadol but have not been able to find it. Patient is asking for a new hard copy to be mailed to her home    Phone Number Patient can be reached at: Home number on file [phone] (home)    Best Time: any time    Can we leave a detailed message on this number? YES    Call taken on 5/7/2018 at 2:35 PM by Michelle Quinn

## 2018-05-07 NOTE — TELEPHONE ENCOUNTER
(Vanesa JAQUEZ) still has script and has refaxed it [facility] in [place]    Please notify patient the pharmacy should have it now    Harshal Hayward RN

## 2018-06-08 ENCOUNTER — MEDICAL CORRESPONDENCE (OUTPATIENT)
Dept: HEALTH INFORMATION MANAGEMENT | Facility: CLINIC | Age: 68
End: 2018-06-08

## 2018-06-23 DIAGNOSIS — G89.29 CHRONIC BILATERAL LOW BACK PAIN WITHOUT SCIATICA: ICD-10-CM

## 2018-06-23 DIAGNOSIS — M54.50 CHRONIC BILATERAL LOW BACK PAIN WITHOUT SCIATICA: ICD-10-CM

## 2018-06-23 DIAGNOSIS — L01.00 IMPETIGO: ICD-10-CM

## 2018-06-25 NOTE — TELEPHONE ENCOUNTER
[place] reviewed:  not reviewed/not due - last done on 4/30/18    [place]:  [email or web address]    Nadia Calderon RN - BC

## 2018-06-25 NOTE — TELEPHONE ENCOUNTER
Controlled Substance Refill Request for traMADol (ULTRAM) 50 MG tablet  Problem List Complete:  No     PROVIDER TO CONSIDER COMPLETION OF PROBLEM LIST AND OVERVIEW/CONTROLLED SUBSTANCE AGREEMENT    Last Written Prescription Date:  5/4/2018  Last Fill Quantity: 60,   # refills: 0    Last Office Visit with [facility] primary care provider: 3/12/2018    Future Office visit:   Next 5 appointments (look out 90 days)     Jul 09, 2018  2:30 PM CDT   Office Visit with Mateus Roberson MD   [facility] ([place] [id number] [facility]  [place] [address]   [phone]                  Controlled substance agreement on file: No.     Processing:  NA   checked in past 3 months?  No, route to RN

## 2018-06-26 RX ORDER — TRAMADOL HYDROCHLORIDE 50 MG/1
TABLET ORAL
Qty: 60 TABLET | Refills: 0 | Status: SHIPPED | OUTPATIENT
Start: 2018-06-26 | End: 2018-10-01

## 2018-06-26 RX ORDER — MUPIROCIN 20 MG/G
OINTMENT TOPICAL
Qty: 22 G | Refills: 0 | Status: SHIPPED | OUTPATIENT
Start: 2018-06-26 | End: 2018-07-02 | Stop reason: SINTOL

## 2018-06-28 ENCOUNTER — TELEPHONE (OUTPATIENT)
Dept: INTERNAL MEDICINE | Facility: CLINIC | Age: 68
End: 2018-06-28

## 2018-06-28 DIAGNOSIS — R32 URINARY INCONTINENCE, UNSPECIFIED TYPE: Primary | ICD-10-CM

## 2018-06-28 NOTE — TELEPHONE ENCOUNTER
Per patient, she has urinary leakage and would like incontinent pads  She thinks she will need to use 3 per day    Her CADI worker, Lyric (ph. [phone]) is helping her get the supplies from [employer] (Fax- [phone])    Please advise  DME order chris'd up      Harshal Hayward RN

## 2018-07-01 ENCOUNTER — NURSE TRIAGE (OUTPATIENT)
Dept: NURSING | Facility: CLINIC | Age: 68
End: 2018-07-01

## 2018-07-01 ENCOUNTER — TELEPHONE (OUTPATIENT)
Dept: INTERNAL MEDICINE | Facility: CLINIC | Age: 68
End: 2018-07-01

## 2018-07-01 DIAGNOSIS — L01.00 IMPETIGO: ICD-10-CM

## 2018-07-01 NOTE — TELEPHONE ENCOUNTER
Patient states PCP ordered:  mupirocin (BACTROBAN) 2 % ointment 22 g 0 6/26/2018  No      Sig: APPLY 3 TIMES DAILY FOR 5 DAYS. CONSIDER USING AQUAPHOR ON SORES AFTER DONE. AVOID SCRATCHING     Ordered for impetigo on the face.  Patient states she has applied the ointment for a day and it causes itching.  The itching resolves after the ointment has been washed off.  Patient is wanting a different cream or ointment ordered.  Would like a message sent to the clinic to call her tomorrow.    Protocol and care advice reviewed.   Caller states understanding of the recommended disposition.  Advised to call back if further questions or concerns.       Message sent to clinic.    Reason for Disposition    Several sores (3 or more)    Additional Information    Negative: [1] Red streak runs from impetigo AND [2] fever    Negative: [1] Red spreading area around 1 sore AND [2] fever    Negative: [1] Red streak or bright red area around 1 sore AND [2] no fever    Protocols used: IMPETIGO (INFECTED SORE)-ADULT-

## 2018-07-01 NOTE — TELEPHONE ENCOUNTER
Clinic Action Needed:Yes  FNA Triage Call  Presenting Problem:    Patient states PCP ordered:  mupirocin (BACTROBAN) 2 % ointment 22 g 0 6/26/2018  No      Sig: APPLY 3 TIMES DAILY FOR 5 DAYS. CONSIDER USING AQUAPHOR ON SORES AFTER DONE. AVOID SCRATCHING     Ordered for impetigo on the face.  Patient states she has applied the ointment for a day and it causes itching.  The itching resolves after the ointment has been washed off.  Patient is wanting a different cream or ointment ordered.  Has 5 areas on the face that she wants treated  Would like a message sent to the clinic to call her tomorrow.    Advised to call back if further questions or concerns.       Guideline Used:Impetigo adult    Routed to:Dr. Roberson/nurse man.    Debbie Small RN/ [facility]

## 2018-07-02 RX ORDER — GINSENG 100 MG
CAPSULE ORAL 3 TIMES DAILY
COMMUNITY
Start: 2018-07-02 | End: 2018-07-07

## 2018-07-02 NOTE — TELEPHONE ENCOUNTER
Bactroban was to be applied TID x 5 days    Please advise on instructions for topical bacitracin.    Harshal Hayward RN

## 2018-07-02 NOTE — TELEPHONE ENCOUNTER
Patient notified of Provider's message as written.  Patient verbalized understanding.  Harshal Hayward RN

## 2018-07-02 NOTE — TELEPHONE ENCOUNTER
Identical instructions , replace mupirocin 2% cream [ bactroban ] with topical bacitracin      Mateus Roberson MD

## 2018-07-02 NOTE — TELEPHONE ENCOUNTER
Dr. Roberson- please advise on alternative for Bactroban as it is causing itchiness     Harshal Hayward RN

## 2018-07-11 RX ORDER — INSULIN GLARGINE 300 U/ML
INJECTION, SOLUTION SUBCUTANEOUS
Qty: 27 ML | Refills: 0 | Status: SHIPPED | OUTPATIENT
Start: 2018-07-11 | End: 2018-08-31

## 2018-07-11 NOTE — TELEPHONE ENCOUNTER
Routing refill request to provider for review/approval because:  Leslie given x1 and patient did not follow up, please advise      Requested Prescriptions   Pending Prescriptions Disp Refills     TOUJEO SOLOSTAR 300 UNIT/ML injection [Pharmacy Med Name: TOUJEO SOLOSTAR 300 UNITS/ML]  Last Written Prescription Date:  4/25/48  Last Fill Quantity: 30 mL,  # refills: 0   Last office visit: 3/12/2018 with prescribing provider:     Future Office Visit:   Next 5 appointments (look out 90 days)     Jul 17, 2018  1:30 PM CDT   Office Visit with Mateus Roberson MD   [facility]    [address] [facility] [address]   [phone]                   0     Sig: INJECT 100 UNITS UNDER THE SKIN AT BEDTIME NEED TO SEE PROVIDER AND HAVE LABS FOR FURTHER REFILLS    Long Acting Insulin Protocol Failed    7/11/2018  9:19 AM       Failed - HgbA1C in past 3 or 6 months    If HgbA1C is 8 or greater, it needs to be on file within the past 3 months.  If less than 8, must be on file within the past 6 months.     Recent Labs   Lab Test  10/17/17   1151   A1C  10.4*            Passed - Blood pressure less than 140/90 in past 6 months    BP Readings from Last 3 Encounters:   03/12/18 132/80   02/15/18 135/56   01/25/18 132/72                Passed - LDL on file in past 12 months    Recent Labs   Lab Test  10/17/17   1151   LDL  29            Passed - Microalbumin on file in past 12 months    Recent Labs   Lab Test  10/17/17   1150   MICROL  <5   UMALCR  Unable to calculate due to low value            Passed - Serum creatinine on file in past 12 months    Recent Labs   Lab Test  01/25/18   1501   CR  1.20*            Passed - Patient is age 18 or older       Passed - Recent (6 mo) or future (30 days) visit within the authorizing provider's specialty    Patient had office visit in the last 6 months or has a visit in the next 30 days with authorizing provider or within the authorizing provider's  "specialty.  See \"Patient Info\" tab in inbasket, or \"Choose Columns\" in Meds & Orders section of the refill encounter.            Nadia Calderon RN - BC      "

## 2018-07-24 ENCOUNTER — TELEPHONE (OUTPATIENT)
Dept: FAMILY MEDICINE | Facility: CLINIC | Age: 68
End: 2018-07-24

## 2018-07-24 NOTE — TELEPHONE ENCOUNTER
Reason for call: med request  Patient called regarding (reason for call): RX request for bumps under her skin on her hairline on her face. The bacitracin is not working  Additional comments: Please call if any questions     Phone number to reach patient: [phone]    Best Time: anytime    Can we leave a detailed message on this number?  YES

## 2018-07-24 NOTE — TELEPHONE ENCOUNTER
Pt informed the need for DM recheck/annuakl exam. Pt scheduled for 8/3/18.  Pt stated can wait until appt for the bumps.  Clementine Figueredo RN

## 2018-08-01 ENCOUNTER — PATIENT OUTREACH (OUTPATIENT)
Dept: GERIATRIC MEDICINE | Facility: CLINIC | Age: 68
End: 2018-08-01

## 2018-08-01 NOTE — PROGRESS NOTES
[facility] Care Coordination Contact  CM received call from clients SYD RAMIREZ, Lyric Cisse, re: annual [place] will be 8/22/18.  We scheduled our annual visit for 9/12/18 at 1:00pm.  Chart to CMS to prep.    GAVIOTA Blackwell  [facility]   [phone]

## 2018-08-17 DIAGNOSIS — E78.5 HYPERLIPIDEMIA LDL GOAL <100: ICD-10-CM

## 2018-08-17 RX ORDER — PRAVASTATIN SODIUM 40 MG
TABLET ORAL
Qty: 90 TABLET | Refills: 0 | Status: SHIPPED | OUTPATIENT
Start: 2018-08-17 | End: 2018-11-17

## 2018-08-18 ENCOUNTER — NURSE TRIAGE (OUTPATIENT)
Dept: NURSING | Facility: CLINIC | Age: 68
End: 2018-08-18

## 2018-08-18 NOTE — TELEPHONE ENCOUNTER
"Ekaterina states she has rectal itching and  stools are hard to pass; noticed \" white things\" in her stool and fears she has pinworms from communal laundry   Advised in home care/ sitz bath and  Cortisone cream for itching   Advised to be seen in clinic andtake stoolsspecimen with her   understands and will comply    Catalina Steven RN  FNA    Reason for Disposition    [1] Home treatment > 3 days for rectal pain AND [2] not improved    Additional Information    Negative: Foreign body in rectum    Negative: Diarrhea is main symptom    Negative: Constipation is main symptom (e.g., pain or discomfort caused by passage of hard BMs)    Negative: Blood in or on bowel movement is main symptom    Negative: Pregnant    Negative: Patient sounds very sick or weak to the triager    Negative: Sexual assault    Negative: Injury to rectum    Negative: Large mass protruding out of rectum    Negative: SEVERE rectal pain (e.g., excruciating, unable to have a bowel movement)    Negative: [1] Rectal pain or redness AND [2] fever > 100.5 F (38.1 C)    Negative: [1] Sudden onset rectal pain AND [2] constipated (straining, rectal pressure or fullness) AND [3] NOT better after SITZ bath, suppository or enema    Negative: MODERATE-SEVERE rectal pain (i.e., interferes with school, work, or sleep)    Negative: MODERATE-SEVERE rectal itching (i.e., interferes with school, work, or sleep)    Negative: Rash of rectal area (e.g., open sore, painful tiny water blisters, unexplained bumps)    Negative: Caller is worried about a sexually transmitted disease (STD)    Negative: Rectal area looks infected (e.g., draining sore, spreading redness)    Negative: Last bowel movement (BM) > 4 days ago    Negative: [1] Home treatment for > 3 days for rectal itching AND [2] not improved    Negative: [1] Stool is mucus or pus AND [2] persists > 24 hours    Negative: [1] Recurrent episodes of unexplained rectal pain AND [2] NO rectal symptoms now    Negative: " Painless lump in rectal area    Protocols used: RECTAL SYMPTOMS-ADULT-AH

## 2018-08-21 ENCOUNTER — TELEPHONE (OUTPATIENT)
Dept: FAMILY MEDICINE | Facility: CLINIC | Age: 68
End: 2018-08-21

## 2018-08-21 NOTE — LETTER
August 21, 2018          Marina Mcdermott José Miguel,  [address]  [address]  [place] [facility] [address]        Dear Marina Valdez      Monitoring and managing your preventative and chronic health conditions are very important to us. Our records indicate that you have not scheduled for Mammogram, Diabetic Check and HgbA1C  which was recommended by Dr. Roberson.      If you have received your health care elsewhere, please call the clinic so the information can be documented in your chart.    Please call [phone] or message us through your CIBDO account to schedule an appointment or provide information for your chart.     Feel free to contact us if you have any questions or concerns!    I look forward to seeing you and working with you on your health care needs.     Sincerely,         Mateus Roberson / Suzy POWELL CMA ([facility])

## 2018-08-21 NOTE — TELEPHONE ENCOUNTER
Panel Management Review      Patient has the following on her problem list:     Diabetes    ASA: Passed    Last A1C  Lab Results   Component Value Date    A1C 10.4 10/17/2017    A1C 8.4 07/10/2017    A1C 7.1 04/18/2017    A1C 9.4 11/11/2016    A1C 10.2 06/14/2016     A1C tested: FAILED    Last LDL:    Lab Results   Component Value Date    CHOL 122 10/17/2017     Lab Results   Component Value Date    HDL 71 10/17/2017     Lab Results   Component Value Date    LDL 29 10/17/2017     Lab Results   Component Value Date    TRIG 109 10/17/2017     Lab Results   Component Value Date    CHOLHDLRATIO 2.2 07/09/2015     Lab Results   Component Value Date    NHDL 51 10/17/2017       Is the patient on a Statin? YES             Is the patient on Aspirin? YES    Medications     HMG CoA Reductase Inhibitors    pravastatin (PRAVACHOL) 40 MG tablet    Salicylates    aspirin 81 MG tablet          Last three blood pressure readings:  BP Readings from Last 3 Encounters:   03/12/18 132/80   02/15/18 135/56   01/25/18 132/72       Date of last diabetes office visit: 01/25/2018     Tobacco History:     History   Smoking Status     Former Smoker     Packs/day: 1.00     Years: 0.00     Types: Cigarettes     Quit date: 1/1/1990   Smokeless Tobacco     Never Used     Comment: quit 1990           Composite cancer screening  Chart review shows that this patient is due/due soon for the following Mammogram  Summary:    Patient is due/failing the following:   A1C, FOLLOW UP, MAMMOGRAM, PHQ9 and PHYSICAL    Action needed:   Patient needs office visit for diabetes check up.    Type of outreach:    Sent letter.    Questions for provider review:    None                                                                                                                                    Suzy POWELL CMA ([facility])       Chart routed to none .

## 2018-08-28 DIAGNOSIS — A60.00 RECURRENT GENITAL HSV (HERPES SIMPLEX VIRUS) INFECTION: ICD-10-CM

## 2018-08-28 NOTE — PROGRESS NOTES
SUBJECTIVE:   Marina Valdez is a 68 year old female who presents to clinic today for the following health issues:       Uncontrolled type 2 diabetes mellitus with stage 3 chronic kidney disease, with long-term current use of insulin (H)  Visit for screening mammogram  Need for prophylactic vaccination against Streptococcus pneumoniae (pneumococcus)  Need for prophylactic vaccination and inoculation against influenza  Need for shingles vaccine  Hypertension goal BP (blood pressure) < 140/90  Pruritus ani  Impacted cerumen of left ear     Diabetes and Morbid Obesity  Marina has a history of diabetes for which she uses insulin. She also has schizophrenia which greatly complicates her treatment and efforts for good diabetes mellitus control which she has never had. A major factor of course is her super-morbid obesity with body mass index above 40 [ Body mass index is 56.68 kg/(m^2). ] She says she uses her insulin three times a day and checks her blood sugars daily. Today's reading she mentions was 140. Her weight has steadily increased over the past year, however over the last 10 years her weight has been fairly consistent for the long term picture. She reports experiencing pain in her right hip. She reports never using Victoza or Trulicity for treatment of her diabetes before. She mentions that her aunt had herpes zoster ophthalmicus and she agrees with ShingRx vaccination against herpes zoster. We have never had success with her diabetes mellitus treatment with insulin adjustments     Pruritis ani  She mentions that she has itchiness in the perirectal area that began three weeks ago, for which she currently is not treating. Her apartment building she says has very few washers and dryers and she believes this might be the reason for the acute symptom of itchiness.     Wt Readings from Last 5 Encounters:   08/31/18 (!) 150.6 kg (332 lb)   03/12/18 (!) 149.2 kg (329 lb)   01/25/18 (!) 147 kg (324 lb)   11/02/17 (!)  146.7 kg (323 lb 6.4 oz)   10/17/17 (!) 141.5 kg (312 lb)     Left ear pain  She notes an irritation in her left ear     Problem list and histories reviewed & adjusted, as indicated.  Additional history: as documented  Medications reviewed with patient    Patient Active Problem List   Diagnosis     Hypertension goal BP (blood pressure) < 140/90     Recurrent genital HSV (herpes simplex virus) infection     Intertrigo     Ovarian Mass s/p excision in 2008     Sebaceous cyst     Microalbuminuria     Hyperlipidemia with target LDL less than 100     Chest pain at rest x1 episode- resolved at rest 3 hours, assoc with food     Obstructive sleep apnea (on cpap)     24 hour contact given to patient      Hydradenitis     CKD (chronic kidney disease) stage 3, GFR 30-59 ml/min     Compulsive skin picking     Need for prophylactic vaccination and inoculation against influenza     High risk OTC medication or supplement use     Rotator cuff tear     Morbid obesity due to excess calories (H)     Chronic bilateral low back pain without sciatica     Primary osteoarthritis of both hips     Schizophrenia, unspecified type (H)     Uncontrolled type 2 diabetes mellitus with stage 3 chronic kidney disease, with long-term current use of insulin (H)     Endometrial cancer, grade I (H)     Impetigo     Health Care Home     Past Surgical History:   Procedure Laterality Date     cataract      bilateral     CYSTOSCOPY N/A 12/29/2016    Procedure: CYSTOSCOPY;  Surgeon: Franca Rousseau MD;  Location: [facility] OR     DAVINCI HYSTERECTOMY TOTAL, BILATERAL SALPINGO-OOPHORECTOMY, COMBINED N/A 12/29/2016    Procedure: COMBINED DAVINCI HYSTERECTOMY TOTAL, SALPINGO-OOPHORECTOMY;  Surgeon: Franca Rousseau MD;  Location: [facility] OR     LAPAROSCOPY      for endometriosis     OOPHORECTOMY      right     WRIST SURGERY      right       Social History   Substance Use Topics     Smoking status: Former Smoker     Packs/day: 1.00     Years: 0.00      Types: Cigarettes     Quit date: 1/1/1990     Smokeless tobacco: Never Used      Comment: quit 1990     Alcohol use No     Family History   Problem Relation Age of Onset     HEART DISEASE Mother      Diabetes Mother      Hypertension Mother      Psychotic Disorder Mother      schitzophrenia     Hypertension Father      Blood Disease Father      high iron level     HEART DISEASE Maternal Grandmother      Cerebrovascular Disease Maternal Grandmother      HEART DISEASE Maternal Grandfather      Breast Cancer Paternal Grandmother      Breast Cancer Sister      HEART DISEASE Brother      Ovarian Cancer Paternal Aunt      Kidney Cancer Daughter      Uterine Cancer Daughter      Uterine Cancer Sister          BP Readings from Last 3 Encounters:   08/31/18 120/74   03/12/18 132/80   02/15/18 135/56    Wt Readings from Last 3 Encounters:   08/31/18 (!) 150.6 kg (332 lb)   03/12/18 (!) 149.2 kg (329 lb)   01/25/18 (!) 147 kg (324 lb)        Reviewed and updated as needed this visit by clinical staff       Reviewed and updated as needed this visit by Provider       ROS:  Constitutional, HEENT, cardiovascular, pulmonary, GI, , musculoskeletal, neuro, skin, endocrine and psych systems are negative, except as otherwise noted.    This document serves as a record of the services and decisions personally performed and made by Mateus Roberson MD. It was created on his behalf by Alfonso Dow, a trained medical scribe. The creation of this document is based on the provider's statements to the medical scribe.  Alfonso Dow 1:50 PM August 31, 2018    OBJECTIVE:     /74  Pulse 124  Temp 99  F (37.2  C) (Oral)  Resp 20  Wt (!) 150.6 kg (332 lb)  LMP 03/24/2010  SpO2 92%  BMI 56.68 kg/m2  Body mass index is 56.68 kg/(m^2).  GENERAL: healthy, alert and no distress  EYES: Eyes grossly normal to inspection, PERRL and conjunctivae and sclerae normal  HENT: left ear partially occluded with cerumen, right ear normal  MS: no gross  musculoskeletal defects noted, no edema  SKIN: no suspicious lesions or rashes  NEURO: Normal strength and tone, mentation intact and speech normal  PSYCH: mentation appears normal, affect normal/bright    Diagnostic Test Results:  Results for orders placed or performed in visit on 08/31/18 (from the past 24 hour(s))   HEMOGLOBIN A1C   Result Value Ref Range    Hemoglobin A1C 10.0 (H) 0 - 5.6 %       ASSESSMENT/PLAN:     (E11.22,  E11.65,  N18.3,  Z79.4) Uncontrolled type 2 diabetes mellitus with stage 3 chronic kidney disease, with long-term current use of insulin (H)  (primary encounter diagnosis)  Comment: we are going to try adding a (glucagon-like peptide-1) GLP-1 agonist for better diabetes mellitus treatment as well as potential weight loss   Plan: BASIC METABOLIC PANEL, Hemoglobin, HEMOGLOBIN         A1C, Albumin Random Urine Quantitative with         Creat Ratio, insulin lispro (HUMALOG KWIKPEN)         100 UNIT/ML injection, insulin glargine U-300         (TOUJEO SOLOSTAR) 300 UNIT/ML injection,         dulaglutide (TRULICITY) 0.75 MG/0.5ML pen,         dulaglutide (TRULICITY) 1.5 MG/0.5ML pen        Further follow up in 3-4 months at the very most     (Z12.31) Visit for screening mammogram  Comment: recommended   Plan: MA SCREENING DIGITAL BILAT - Future  (s+30)            (Z23) Need for prophylactic vaccination against Streptococcus pneumoniae (pneumococcus)  Comment:   Immunization History   Administered Date(s) Administered     Influenza (H1N1) 11/24/2009     Influenza (High Dose) 3 valent vaccine 08/31/2018     Influenza (IIV3) PF 09/13/2010, 09/07/2011, 09/15/2012, 01/26/2013, 09/16/2013, 09/30/2014, 09/02/2015, 09/07/2016     Influenza vaccine ages 6-35 months 09/15/2017     Pneumo Conj 13-V (2010&after) 06/14/2016     Pneumococcal 23 valent 03/25/2013     TDAP Vaccine (Adacel) 02/23/2010     Zoster vaccine recombinant adjuvanted (SHINGRIX) 08/31/2018     Zoster vaccine, live 09/15/2012, 01/26/2013       Plan: not needed    (Z23) Need for prophylactic vaccination and inoculation against influenza  Comment: administered   Plan: FLU VACCINE, INCREASED ANTIGEN, PRESV FREE, AGE        65+ [86961], Vaccine Administration, Initial         [33274]            (Z23) Need for shingles vaccine  Comment: pharmacy router form given   Plan: as above     (I10) Hypertension goal BP (blood pressure) < 140/90  Comment:   BP Readings from Last 3 Encounters:   08/31/18 120/74   03/12/18 132/80   02/15/18 135/56       Plan: continue current plan of care      (L29.0) Pruritus ani  Comment: this is a multifactorial problem and I am not prepared to do the anogenital exam today but if her symptoms aren't improved she will need to be examined either with me or with proctology specialist   Plan: triamcinolone (KENALOG) 0.1 % cream            (H61.22) Impacted cerumen of left ear  Comment: she had on exam a small amount of ear wax impaction   Plan: REMOVE IMPACTED CERUMEN              See Patient Instructions    The information in this document, created by the medical scribe for me, accurately reflects the services I personally performed and the decisions made by me. I have reviewed and approved this document for accuracy prior to leaving the patient care area.  August 31, 2018 1:50 PM    Mateus Roberson MD  [facility]      Injectable Influenza Immunization Documentation    1.  Is the person to be vaccinated sick today?   No    2. Does the person to be vaccinated have an allergy to a component   of the vaccine?   No  Egg Allergy Algorithm Link    3. Has the person to be vaccinated ever had a serious reaction   to influenza vaccine in the past?   No    4. Has the person to be vaccinated ever had Guillain-Barré syndrome?   No    Form completed by Suzy POWELL CMA ([facility])

## 2018-08-30 RX ORDER — VALACYCLOVIR HYDROCHLORIDE 500 MG/1
TABLET, FILM COATED ORAL
Qty: 90 TABLET | Refills: 1 | Status: SHIPPED | OUTPATIENT
Start: 2018-08-30 | End: 2018-11-26

## 2018-08-31 ENCOUNTER — OFFICE VISIT (OUTPATIENT)
Dept: FAMILY MEDICINE | Facility: CLINIC | Age: 68
End: 2018-08-31
Payer: COMMERCIAL

## 2018-08-31 VITALS
WEIGHT: 293 LBS | SYSTOLIC BLOOD PRESSURE: 120 MMHG | HEART RATE: 124 BPM | TEMPERATURE: 99 F | BODY MASS INDEX: 56.68 KG/M2 | RESPIRATION RATE: 20 BRPM | OXYGEN SATURATION: 92 % | DIASTOLIC BLOOD PRESSURE: 74 MMHG

## 2018-08-31 DIAGNOSIS — H61.22 IMPACTED CERUMEN OF LEFT EAR: ICD-10-CM

## 2018-08-31 DIAGNOSIS — Z23 NEED FOR PROPHYLACTIC VACCINATION AGAINST STREPTOCOCCUS PNEUMONIAE (PNEUMOCOCCUS): ICD-10-CM

## 2018-08-31 DIAGNOSIS — I10 HYPERTENSION GOAL BP (BLOOD PRESSURE) < 140/90: ICD-10-CM

## 2018-08-31 DIAGNOSIS — Z12.31 VISIT FOR SCREENING MAMMOGRAM: ICD-10-CM

## 2018-08-31 DIAGNOSIS — Z23 NEED FOR PROPHYLACTIC VACCINATION AND INOCULATION AGAINST INFLUENZA: ICD-10-CM

## 2018-08-31 DIAGNOSIS — Z23 NEED FOR SHINGLES VACCINE: ICD-10-CM

## 2018-08-31 DIAGNOSIS — L29.0 PRURITUS ANI: ICD-10-CM

## 2018-08-31 LAB
ANION GAP SERPL CALCULATED.3IONS-SCNC: 12 MMOL/L (ref 3–14)
BUN SERPL-MCNC: 20 MG/DL (ref 7–30)
CALCIUM SERPL-MCNC: 9.4 MG/DL (ref 8.5–10.1)
CHLORIDE SERPL-SCNC: 99 MMOL/L (ref 94–109)
CO2 SERPL-SCNC: 25 MMOL/L (ref 20–32)
CREAT SERPL-MCNC: 1.32 MG/DL (ref 0.52–1.04)
GFR SERPL CREATININE-BSD FRML MDRD: 40 ML/MIN/1.7M2
GLUCOSE SERPL-MCNC: 315 MG/DL (ref 70–99)
HBA1C MFR BLD: 10 % (ref 0–5.6)
HGB BLD-MCNC: 12.9 G/DL (ref 11.7–15.7)
POTASSIUM SERPL-SCNC: 4.3 MMOL/L (ref 3.4–5.3)
SODIUM SERPL-SCNC: 136 MMOL/L (ref 133–144)

## 2018-08-31 PROCEDURE — 90471 IMMUNIZATION ADMIN: CPT | Performed by: INTERNAL MEDICINE

## 2018-08-31 PROCEDURE — 83036 HEMOGLOBIN GLYCOSYLATED A1C: CPT | Performed by: INTERNAL MEDICINE

## 2018-08-31 PROCEDURE — 85018 HEMOGLOBIN: CPT | Performed by: INTERNAL MEDICINE

## 2018-08-31 PROCEDURE — 80048 BASIC METABOLIC PNL TOTAL CA: CPT | Performed by: INTERNAL MEDICINE

## 2018-08-31 PROCEDURE — 90662 IIV NO PRSV INCREASED AG IM: CPT | Performed by: INTERNAL MEDICINE

## 2018-08-31 PROCEDURE — 36415 COLL VENOUS BLD VENIPUNCTURE: CPT | Performed by: INTERNAL MEDICINE

## 2018-08-31 PROCEDURE — 99214 OFFICE O/P EST MOD 30 MIN: CPT | Mod: 25 | Performed by: INTERNAL MEDICINE

## 2018-08-31 RX ORDER — TRIAMCINOLONE ACETONIDE 1 MG/G
CREAM TOPICAL
Qty: 30 G | Refills: 0 | Status: SHIPPED | OUTPATIENT
Start: 2018-08-31 | End: 2018-11-14

## 2018-08-31 ASSESSMENT — ANXIETY QUESTIONNAIRES
6. BECOMING EASILY ANNOYED OR IRRITABLE: NOT AT ALL
GAD7 TOTAL SCORE: 0
5. BEING SO RESTLESS THAT IT IS HARD TO SIT STILL: NOT AT ALL
3. WORRYING TOO MUCH ABOUT DIFFERENT THINGS: NOT AT ALL
7. FEELING AFRAID AS IF SOMETHING AWFUL MIGHT HAPPEN: NOT AT ALL
1. FEELING NERVOUS, ANXIOUS, OR ON EDGE: NOT AT ALL
IF YOU CHECKED OFF ANY PROBLEMS ON THIS QUESTIONNAIRE, HOW DIFFICULT HAVE THESE PROBLEMS MADE IT FOR YOU TO DO YOUR WORK, TAKE CARE OF THINGS AT HOME, OR GET ALONG WITH OTHER PEOPLE: NOT DIFFICULT AT ALL
2. NOT BEING ABLE TO STOP OR CONTROL WORRYING: NOT AT ALL

## 2018-08-31 ASSESSMENT — PATIENT HEALTH QUESTIONNAIRE - PHQ9: 5. POOR APPETITE OR OVEREATING: NOT AT ALL

## 2018-08-31 NOTE — LETTER
[facility]  [address] [place] [address]    September 4, 2018    Marina Valdez  [address]    [place] [id number]          Dear Marina,    Enclosed is a copy of your results. These laboratory studies are all ok, except for the high hemoglobin A1c (diabetes test).  As you know, we started Trulicity (dulaglutide) at your last appointment, and I am excited to see how this medication might work out !     Results for orders placed or performed in visit on 08/31/18   BASIC METABOLIC PANEL   Result Value Ref Range    Sodium 136 133 - 144 mmol/L    Potassium 4.3 3.4 - 5.3 mmol/L    Chloride 99 94 - 109 mmol/L    Carbon Dioxide 25 20 - 32 mmol/L    Anion Gap 12 3 - 14 mmol/L    Glucose 315 (H) 70 - 99 mg/dL    Urea Nitrogen 20 7 - 30 mg/dL    Creatinine 1.32 (H) 0.52 - 1.04 mg/dL    GFR Estimate 40 (L) >60 mL/min/1.7m2    GFR Estimate If Black 49 (L) >60 mL/min/1.7m2    Calcium 9.4 8.5 - 10.1 mg/dL   Hemoglobin   Result Value Ref Range    Hemoglobin 12.9 11.7 - 15.7 g/dL   HEMOGLOBIN A1C   Result Value Ref Range    Hemoglobin A1C 10.0 (H) 0 - 5.6 %     If you have any questions or concerns, please call myself or my nurse at [phone].    Sincerely,      Mateus Roberson MD/[facility]y

## 2018-08-31 NOTE — MR AVS SNAPSHOT
After Visit Summary   8/31/2018    Marina Valdez    MRN: [id number]           Patient Information     Date Of Birth          1950        Visit Information        Provider Department      8/31/2018 1:50 PM Mateus Roberson MD [facility]        Today's Diagnoses     Uncontrolled type 2 diabetes mellitus with stage 3 chronic kidney disease, with long-term current use of insulin (H)    -  1    Visit for screening mammogram        Need for prophylactic vaccination against Streptococcus pneumoniae (pneumococcus)        Need for prophylactic vaccination and inoculation against influenza        Need for shingles vaccine        Hypertension goal BP (blood pressure) < 140/90        Pruritus ani        Impacted cerumen of left ear          Care Instructions    [facility]    If you have any questions regarding to your visit please contact your care team:     Team Pink:   Clinic Hours Telephone Number   Internal Medicine:  Dr. Jeanie Decker NP 7am-7pm  Monday - Thursday   7am-5pm  Fridays  [phone]  (Appointment scheduling available 24/7)   Urgent Care - [place] and [place] - 11am-9pm Monday-Friday Saturday-Sunday- 9am-5pm   [place] - 5pm-9pm Monday-Friday Saturday-Sunday- 9am-5pm  [phone] - [place]  [phone] - [place]       What options do I have for a visit other than an office visit? We offer electronic visits (e-visits) and telephone visits, when medically appropriate.  Please check with your medical insurance to see if these types of visits are covered, as you will be responsible for any charges that are not paid by your insurance.      You can use [employer] (secure electronic communication) to access to your chart, send your primary care provider a message, or make an appointment. Ask a team member how to get started.     For a price quote for your services, please call our Consumer Price Line at  [phone] or our Imaging Cost estimation line at [phone] (for imaging tests).  Suzy POWELL CMA ([facility])            Follow-ups after your visit        Future tests that were ordered for you today     Open Future Orders        Priority Expected Expires Ordered    MA SCREENING DIGITAL BILAT - Future  (s+30) Routine  8/28/2019 8/31/2018            Who to contact     If you have questions or need follow up information about today's clinic visit or your schedule please contact [facility] directly at [phone].  Normal or non-critical lab and imaging results will be communicated to you by MyChart, letter or phone within 4 business days after the clinic has received the results. If you do not hear from us within 7 days, please contact the clinic through MyChart or phone. If you have a critical or abnormal lab result, we will notify you by phone as soon as possible.  Submit refill requests through [employer] or call your pharmacy and they will forward the refill request to us. Please allow 3 business days for your refill to be completed.          Additional Information About Your Visit        Care EveryWhere ID     This is your Care EveryWhere ID. This could be used by other organizations to access your [place] medical records  [id number]        Your Vitals Were     Pulse Temperature Respirations Last Period Pulse Oximetry BMI (Body Mass Index)    124 99  F (37.2  C) (Oral) 20 03/24/2010 92% 56.68 kg/m2       Blood Pressure from Last 3 Encounters:   08/31/18 120/74   03/12/18 132/80   02/15/18 135/56    Weight from Last 3 Encounters:   08/31/18 (!) 332 lb (150.6 kg)   03/12/18 (!) 329 lb (149.2 kg)   01/25/18 (!) 324 lb (147 kg)              We Performed the Following     Albumin Random Urine Quantitative with Creat Ratio     BASIC METABOLIC PANEL     FLU VACCINE, INCREASED ANTIGEN, PRESV FREE, AGE 65+ [21229]     HEMOGLOBIN A1C     Hemoglobin     REMOVE IMPACTED CERUMEN     Vaccine Administration,  Initial [82294]          Today's Medication Changes          These changes are accurate as of 8/31/18  2:07 PM.  If you have any questions, ask your nurse or doctor.               Start taking these medicines.        Dose/Directions    * dulaglutide 0.75 MG/0.5ML pen   Commonly known as:  TRULICITY   Used for:  Uncontrolled type 2 diabetes mellitus with stage 3 chronic kidney disease, with long-term current use of insulin (H)   Started by:  Mateus Roberson MD        Dose:  0.75 mg   Inject 0.75 mg Subcutaneous every 7 days   Quantity:  0.5 mL   Refills:  0       * dulaglutide 1.5 MG/0.5ML pen   Commonly known as:  TRULICITY   Used for:  Uncontrolled type 2 diabetes mellitus with stage 3 chronic kidney disease, with long-term current use of insulin (H)   Started by:  Mateus Roberson MD        Dose:  1.5 mg   Inject 1.5 mg Subcutaneous every 7 days   Quantity:  0.5 mL   Refills:  2       triamcinolone 0.1 % cream   Commonly known as:  KENALOG   Used for:  Pruritus ani   Started by:  Mateus Roberson MD        Apply sparingly to affected area three times daily as needed   Quantity:  30 g   Refills:  0       * Notice:  This list has 2 medication(s) that are the same as other medications prescribed for you. Read the directions carefully, and ask your doctor or other care provider to review them with you.         Where to get your medicines      These medications were sent to [place] [facility] [id number] [facility] - [place], [place] - [address]  [address] [address]     Phone:  [phone]     dulaglutide 0.75 MG/0.5ML pen    dulaglutide 1.5 MG/0.5ML pen    insulin glargine U-300 300 UNIT/ML injection    insulin lispro 100 UNIT/ML injection    triamcinolone 0.1 % cream                Primary Care Provider Office Phone # Fax #    Mateus Roberson [phone] [address] [address]        Equal Access to Services     MILENA GUILLEN AH: sheyla Wu  miguelito micah gwenyenni mcpherson, sanjana vega ah. So [facility] [phone].    ATENCIÓN: Si mahesh tony, tiene a garza disposición servicios gratuitos de asistencia lingüística. Munir al [phone].    We comply with applicable federal civil rights laws and [place] laws. We do not discriminate on the basis of race, color, national origin, age, disability, sex, sexual orientation, or gender identity.            Thank you!     Thank you for choosing [facility]  for your care. Our goal is always to provide you with excellent care. Hearing back from our patients is one way we can continue to improve our services. Please take a few minutes to complete the written survey that you may receive in the mail after your visit with us. Thank you!             Your Updated Medication List - Protect others around you: Learn how to safely use, store and throw away your medicines at www.disposemymeds.org.          This list is accurate as of 8/31/18  2:07 PM.  Always use your most recent med list.                   Brand Name Dispense Instructions for use Diagnosis    acetaminophen 500 MG tablet    TYLENOL     Take 500 mg by mouth        aspirin 81 MG tablet     100 tablet    Take 1 tablet (81 mg) by mouth daily    Type 2 diabetes, HbA1C goal < 8% (H)       B-D U/F 31G X 8 MM   Generic drug:  insulin pen needle     400 each    USE FOUR TIMES DAILY AS DIRECTED WITH LANTUS AND PREMEAL INSULIN    Uncontrolled type 2 diabetes mellitus with stage 3 chronic kidney disease, with long-term current use of insulin (H)       BENADRYL 25 MG tablet   Generic drug:  diphenhydrAMINE      Take 25 mg by mouth At Bedtime And 25 mg nightly as needed        blood glucose monitoring lancets     300 each    Use to test blood sugars 3 times daily or as directed. Qty of 1 box= 100 lancets    Uncontrolled type 2 diabetes mellitus with stage 3 chronic kidney disease, with long-term current use of insulin (H)       *  blood glucose monitoring test strip    no brand specified    300 strip    Use to test blood sugars 3 times daily or as directed    Uncontrolled type 2 diabetes mellitus with stage 3 chronic kidney disease, with long-term current use of insulin (H)       * blood glucose monitoring test strip    ONETOUCH VERIO IQ    100 strip    TEST THREE TIMES DAILY OR AS DIRECTED    Uncontrolled type 2 diabetes mellitus with stage 3 chronic kidney disease, with long-term current use of insulin (H)       cholecalciferol 1000 units Tabs      Take 1 tablet by mouth daily        * dulaglutide 0.75 MG/0.5ML pen    TRULICITY    0.5 mL    Inject 0.75 mg Subcutaneous every 7 days    Uncontrolled type 2 diabetes mellitus with stage 3 chronic kidney disease, with long-term current use of insulin (H)       * dulaglutide 1.5 MG/0.5ML pen    TRULICITY    0.5 mL    Inject 1.5 mg Subcutaneous every 7 days    Uncontrolled type 2 diabetes mellitus with stage 3 chronic kidney disease, with long-term current use of insulin (H)       fluconazole 100 MG tablet    DIFLUCAN    7 tablet    Diflucan 100 mg by mouth x 1 time a day x 7 days.    Candidal intertrigo       GEODON 80 MG capsule   Generic drug:  ziprasidone      Take 160 mg by mouth every morning And 80 mg daily at 10 AM, and 80 mg nightly as needed        hydrocortisone valerate 0.2 % ointment    WEST-JOE    15 g    Apply sparingly to affected area three times daily for 14 days.    Eczema, unspecified type, Compulsive skin picking, Impetigo       insulin glargine U-300 300 UNIT/ML injection    TOUJEO SOLOSTAR    27 mL    INJECT 100 UNITS UNDER THE SKIN AT BEDTIME NEED TO SEE PROVIDER AND HAVE LABS FOR FURTHER REFILLS    Uncontrolled type 2 diabetes mellitus with stage 3 chronic kidney disease, with long-term current use of insulin (H)       insulin lispro 100 UNIT/ML injection    HumaLOG KWIKpen    3 mL    Inject 15 Units Subcutaneous 3 times daily (before meals)    Uncontrolled type 2 diabetes  mellitus with stage 3 chronic kidney disease, with long-term current use of insulin (H)       nystatin 366355 UNIT/GM Powd    MYCOSTATIN    120 g    APPLY TO GROIN AND UNDER BREAST TWICE DAILY UNTIL REDNESS HAS RESOLVED.    Candidal intertrigo       order for DME     1 Device    Equipment being ordered: CPAP and associated supplies and tubing    Obstructive sleep apnea       * order for DME     1 each    Equipment being ordered: Lift Chair    Morbid obesity due to excess calories (H), Chronic bilateral low back pain without sciatica       * order for DME     1 Device    Equipment being ordered: corset to prevent compulsive skin picking of abdomen    Compulsive skin picking       * order for DME     1 Device    Equipment being ordered: CPAP machine set at 15 pressure, heated humidifier, supplies: mask and tubing ( supplies)    Obstructive sleep apnea       * order for DME     1 Device    Equipment being ordered: CPAP    Obstructive sleep apnea       * order for DME     1 Device    Equipment being ordered: home blood pressure cuff for home blood pressure monitoring    Hypertension goal BP (blood pressure) < 140/90       order for DME     3 Month    Equipment being ordered: Women's incontinence pads/liners, 3 per day    Urinary incontinence, unspecified type       PARoxetine 40 MG tablet    PAXIL     Take 80 mg by mouth daily. 80mg=2 tablets.        pravastatin 40 MG tablet    PRAVACHOL    90 tablet    TAKE 1 TABLET (40 MG) BY MOUTH DAILY    Hyperlipidemia LDL goal <100       senna-docusate 8.6-50 MG per tablet    SENOKOT-S;PERICOLACE     Take 2 tablets by mouth 2 times daily as needed for constipation        traMADol 50 MG tablet    ULTRAM    60 tablet    TAKE 1 TABLET BY MOUTH TWICE A DAY AS NEEDED FOR MODERATE PAIN    Chronic bilateral low back pain without sciatica       triamcinolone 0.1 % cream    KENALOG    30 g    Apply sparingly to affected area three times daily as needed    Pruritus ani       valACYclovir  500 MG tablet    VALTREX    90 tablet    TAKE 1 TABLET (500 MG) BY MOUTH DAILY    Recurrent genital HSV (herpes simplex virus) infection       valsartan-hydrochlorothiazide 160-25 MG per tablet    DIOVAN-HCT    90 tablet    Take 1 tablet by mouth daily    Uncontrolled type 2 diabetes mellitus with stage 3 chronic kidney disease, with long-term current use of insulin (H)       * Notice:  This list has 9 medication(s) that are the same as other medications prescribed for you. Read the directions carefully, and ask your doctor or other care provider to review them with you.

## 2018-09-01 ENCOUNTER — NURSE TRIAGE (OUTPATIENT)
Dept: NURSING | Facility: CLINIC | Age: 68
End: 2018-09-01

## 2018-09-01 ASSESSMENT — PATIENT HEALTH QUESTIONNAIRE - PHQ9: SUM OF ALL RESPONSES TO PHQ QUESTIONS 1-9: 0

## 2018-09-01 ASSESSMENT — ANXIETY QUESTIONNAIRES: GAD7 TOTAL SCORE: 0

## 2018-09-01 NOTE — TELEPHONE ENCOUNTER
Patient said she was seen yesterday and received Shingles and flu shot.  Patient says last night she began having sever back pain of 7 (out of 10) and wants to know if it could be a side effect of the shots.  Reviewed guideline and care advice with caller.  FNA advised to call back with questions or worsening symptoms.  Caller verbalizes understanding.      Reason for Disposition    [1] SEVERE back pain (e.g., excruciating) AND [2] sudden onset AND [3] age > 60    Additional Information    Negative: Passed out (i.e., lost consciousness, collapsed and was not responding)    Negative: Shock suspected (e.g., cold/pale/clammy skin, too weak to stand, low BP, rapid pulse)    Negative: Sounds like a life-threatening emergency to the triager    Negative: Major injury to the back (e.g., MVA, fall > 10 feet or 3 meters, penetrating injury, etc.)    Negative: Followed a tailbone injury    Negative: [1] Pain in the upper back over the ribs (rib cage) AND [2] radiates (travels, goes) into chest    Negative: [1] Pain in the upper back over the ribs (rib cage) AND [2] worsened by coughing (or clearly increases with breathing)    Negative: Back pain during pregnancy    Negative: Pain mainly in flank (i.e., in the side, over the lower ribs or just below the ribs)    Protocols used: BACK PAIN-ADULT-

## 2018-09-02 ENCOUNTER — TELEPHONE (OUTPATIENT)
Dept: FAMILY MEDICINE | Facility: CLINIC | Age: 68
End: 2018-09-02

## 2018-09-02 NOTE — TELEPHONE ENCOUNTER
Reason for call:  Medication   If this is a refill request, has the caller requested the refill from the pharmacy already? Yes  Will the patient be using a [facility]? No  Name of the pharmacy and phone number for the current request: [facility]    Name of the medication requested: trulicity    Other request: call the pharmacy    Phone number to reach patient:  Home number on file [phone] (home)    Best Time:  Anytime     Can we leave a detailed message on this number?  YES

## 2018-09-04 NOTE — TELEPHONE ENCOUNTER
This message lacks significant information. What medication is requested ?    Mateus Roberson MD

## 2018-09-05 NOTE — TELEPHONE ENCOUNTER
Patient is requesting a refill of Trulicity. Routing to provider to verify correct dose. Patient is requesting prescriptions for both 0.75 mg every 7 days and 1.5 mg every 7 days.    Nadia Calderon RN - BC

## 2018-09-05 NOTE — TELEPHONE ENCOUNTER
This is all documented with our last office visit. Trulicity (dulaglutide) is always prescribed the same way.    The starting dose of 0.75 milligrams times one month   Increase to the maintainence dose of 1.5 milligrams and this is to be continued for the foreseeable future .    The 0.75 milligram dose was struck off the medication list.    The 1.5 dose is refilled for 3 months     Make sure patient understands and is using the correct dosing    Mateus Roberson MD

## 2018-09-06 RX ORDER — LIRAGLUTIDE 6 MG/ML
INJECTION SUBCUTANEOUS
Qty: 9 ML | Refills: 2 | Status: SHIPPED | OUTPATIENT
Start: 2018-09-06 | End: 2019-01-07

## 2018-09-06 NOTE — TELEPHONE ENCOUNTER
Therefore, the logical next step is to discontinue Trulicity (dulaglutide) and begin Liraglutide [ Victoza ]     Liraglutide [ Victoza ] is generally started at 0.6mg daily x1 week, then is increased to 1.2mg for at least 1 week, then up to 1.8mg daily which is the maintainence dose.  If you have nausea/vomiting at any stage, then we would slow down the upward tapering with the dose. Remember , Liraglutide [ Victoza ] is a 1 time per day medication , not one time a week like Trulicity (dulaglutide) .        Please help implement these orders / requests , Reroute if additional input requested from farhat Roberson MD

## 2018-09-06 NOTE — TELEPHONE ENCOUNTER
Called patient. She stated that this is not covered    Called pharmacy and they verified that a PA would be needed  Preferred meds would be Bydureon or Emily Hayward RN

## 2018-09-07 ENCOUNTER — TELEPHONE (OUTPATIENT)
Dept: INTERNAL MEDICINE | Facility: CLINIC | Age: 68
End: 2018-09-07

## 2018-09-08 NOTE — TELEPHONE ENCOUNTER
Reason for call:  Other   Patient called regarding (reason for call): prescription  Additional comments: Patient calling to informed Dr. Roberson that [facility] is contacting the insurance company about the victoza. It is not approved yet.    Phone number to reach patient:  Home number on file [phone] (home)    Best Time:  Any time    Can we leave a detailed message on this number?  YES     Marlin العلي  Central Scheduler

## 2018-09-10 RX ORDER — PEN NEEDLE, DIABETIC 31 GX5/16"
NEEDLE, DISPOSABLE MISCELLANEOUS
Refills: 0
Start: 2018-09-10

## 2018-09-10 NOTE — TELEPHONE ENCOUNTER
insulin pen needle (B-D U/F) 31G X 8  each 0 9/6/2018  No     Sig: Use 5 pen needles daily or as directed.     Class: E-Prescribe     Order: [id number]     E-Prescribing Status: Receipt confirmed by pharmacy (9/6/2018 11:51 AM CDT)         Call pharmacy after 9 to confirm if duplicate.  Thank you   Nicole

## 2018-09-10 NOTE — TELEPHONE ENCOUNTER
Spoke to pharmacy and confirmed duplicate request.  Stacey CROFT CMA ([facility])

## 2018-09-10 NOTE — TELEPHONE ENCOUNTER
"Other then patient sending me an \"FYI\" I don't see What action can be taken to fix this ?     Mateus Roberson MD    "

## 2018-09-12 ENCOUNTER — PATIENT OUTREACH (OUTPATIENT)
Dept: GERIATRIC MEDICINE | Facility: CLINIC | Age: 68
End: 2018-09-12

## 2018-09-12 DIAGNOSIS — Z76.89 HEALTH CARE HOME: ICD-10-CM

## 2018-09-12 NOTE — TELEPHONE ENCOUNTER
Prior Authorization Retail Medication Request    Medication/Dose: liraglutide (VICTOZA) 18 MG/3ML soln  ICD code (if different than what is on RX):    Previously Tried and Failed:    Rationale:      Insurance Name:  [employer]  Insurance ID:        Pharmacy Information (if different than what is on RX)  Name:  [facility]  Phone:  [phone]

## 2018-09-13 ASSESSMENT — ACTIVITIES OF DAILY LIVING (ADL): DEPENDENT_IADLS:: CLEANING;COOKING;LAUNDRY;SHOPPING;MEAL PREPARATION

## 2018-09-13 NOTE — PROGRESS NOTES
[facility] Care Coordination Contact  [facility] CCDB Assessment   (for members on other waivers)    Home visit for Health Risk Assessment with Marina Valdez completed on September 12, 2018    Type of residence:: Apartment  Current living arrangement:: I live alone     Assessment completed with:: Patient, Other (CADI CM)    Current Care Plan  Member currently receiving the following home care services: Skilled Nursing   Member currently receiving the following community resources: DME, Housekeeping/Chore Agency, Home Care, Lifeline, Meals on Wheels, OP Mental Health    Medication Review  Medication reconciliation completed in Epic: Yes  Medication set-up & administration: Independent and sets up on own weekly.  Self-administers medications.  Medication understanding concerns (by member, family or CC): No  Medication adherence concerns (by member, family or CC): No    Mental/Behavioral Health   Depression Screening: See PHQ assessment flowsheet - client scored 0. Client reports mental health is stable currently.  Mental health DX:: Yes   Mental health DX how managed:: Medication, Psychiatrist    Falls Assessment:   Fallen 2 or more times in the past year?: No   Any fall with injury in the past year?: No    ADL/IADL Dependencies:      Dependent IADLs:: Cleaning, Cooking, Laundry, Shopping, Meal Preparation    [facility] Plan sponsored benefits: Shared information re: Silver Sneakers/gym memberships, ASA, Calcium +D.    PCA Assessment completed at visit: No     Elderly Waiver Eligibility: No-does not meet criteria    Care Plan & Recommendations: client will continue with current services through her CADI waiver.    CADI Care Plan/ISP requested from waiver .   MMIS entry completed by: Case Management Specialist    Follow-Up Plan: Member informed of future contact, plan to f/u with member with a 6 month telephone assessment.  Contact information shared with member and family, encouraged  member to call with any questions or concerns at any time.    [place] care continuum providers: Please refer to Health Care Home on the Epic Problem List to view this patient's [facility] Care Plan Summary.    GAVIOTA Blackwell  [facility] Care Coordinator  [phone]

## 2018-09-14 ASSESSMENT — PATIENT HEALTH QUESTIONNAIRE - PHQ9: SUM OF ALL RESPONSES TO PHQ QUESTIONS 1-9: 0

## 2018-09-16 ENCOUNTER — TELEPHONE (OUTPATIENT)
Dept: FAMILY MEDICINE | Facility: CLINIC | Age: 68
End: 2018-09-16

## 2018-09-17 ENCOUNTER — TELEPHONE (OUTPATIENT)
Dept: INTERNAL MEDICINE | Facility: CLINIC | Age: 68
End: 2018-09-17

## 2018-09-17 ENCOUNTER — TELEPHONE (OUTPATIENT)
Dept: FAMILY MEDICINE | Facility: CLINIC | Age: 68
End: 2018-09-17

## 2018-09-17 RX ORDER — LIRAGLUTIDE 6 MG/ML
INJECTION SUBCUTANEOUS
Qty: 9 ML | Refills: 2
Start: 2018-09-17

## 2018-09-17 NOTE — TELEPHONE ENCOUNTER
Prior Authorization Retail Medication Request    Medication/Dose: liraglutide (VICTOZA) 18 MG/3ML soln  ICD code (if different than what is on RX):  **  Previously Tried and Failed:  **  Rationale:  **    Insurance Name:  [facility]  Insurance ID:  [id number]       Pharmacy Information (if different than what is on RX)  Name:    [id number] - [place], [place] - [address]  [address] [address]  Phone: [phone] Fax: [phone]

## 2018-09-17 NOTE — TELEPHONE ENCOUNTER
PA Initiation    Medication: liraglutide (VICTOZA) 18 MG/3ML soln  Insurance Company: [employer] - Phone [phone] Fax [phone]  Pharmacy Filling the Rx: [id number] [place] - [address]  Filling Pharmacy Phone: [phone]  Filling Pharmacy Fax:    Start Date: 9/17/2018    THIS HAS BEEN SUBMITTED BY THE PRIOR-AUTHORIZATION TEAM. ANY QUESTIONS PLEASE CALL [phone]. THANK YOU

## 2018-09-17 NOTE — TELEPHONE ENCOUNTER
Reason for call:  Symptom   Symptom or request: Sore throat, low grade temp.    Duration (how long have symptoms been present): 4 days  Have you been treated for this before? No    Additional comments: Patient called with symptoms of a sore throat, she stated that her home care nurse said she had a low grade fever of 99.0 on Thursday. She want's to know if Dr. Roberson can prescribe her something for it. She refused triage. Please follow up with patient, as she would like to  [facility] [place]: [phone].    Phone number to reach patient:  Home number on file [phone] (home)    Best Time:  anytime    Can we leave a detailed message on this number?  YES

## 2018-09-17 NOTE — TELEPHONE ENCOUNTER
Reason for call:  Patient reporting a symptom    Symptom or request: Sore throat    Duration (how long have symptoms been present): Raw, 4 + days; can't come in has a low tire pressure; painful hip. Would like phone visit.    Have you been treated for this before? No    Additional comments: Has someone who can  a script.    Phone Number patient can be reached at:  Home number on file [phone] (home)    Best Time:  ASAP    Can we leave a detailed message on this number:  YES    Call taken on 9/17/2018 at 9:28 AM by Radha Cerrato

## 2018-09-17 NOTE — TELEPHONE ENCOUNTER
See triage notes from earlier today    Called and Spoke with patient again  Advised her that if she is unable to come in for an appointment to have her sore throat checked then we would recommend home remedies and OTC meds.  Patient stated that she believes she has strep throat and would like a prescription for treatment.  Explained to her that she would need strep test completed.  She asked if this can be a lab only appointment.  Advised her that if she can come in for a lab appointment then she should make it an OV as provider will need to assess her throat and obtain a sample  She agreed and requested an appointment for tomorrow  Appointment made for tomorrow      Harshal Hayward RN

## 2018-09-17 NOTE — TELEPHONE ENCOUNTER
"Per patient, she has had a sore throat for the past 4 days.  It is better in the AM but worsens at night.  She denies chills or any other symptoms. Temp was 99 on Thursday 9/13/18  Stated that it is not difficult to eat, swallow, or breathe  Has been using Tylenol but not sure if it is helping.  She has not looked in the back of her throat and is not sure if there are any white spots or swelling but stated that \"it feels raw back there\"    Advised her that she can schedule an appointment and provider can check the back of her throat   She stated that she will call back to schedule on her own    Harshal Hayward RN    "

## 2018-09-17 NOTE — TELEPHONE ENCOUNTER
Prior Authorization Approval    Authorization Effective Date: 8/18/2018  Authorization Expiration Date: 9/16/2021  Medication: liraglutide (VICTOZA) 18 MG/3ML soln approved   Approved Dose/Quantity:   Reference #:     Insurance Company: [employer] - Phone [phone] Fax [phone]  Expected CoPay:       CoPay Card Available:      Foundation Assistance Needed:    Which Pharmacy is filling the prescription (Not needed for infusion/clinic administered): [id number] [place] [address]  Pharmacy Notified: Yes  Patient Notified: Yes

## 2018-09-17 NOTE — TELEPHONE ENCOUNTER
Telephone encounter created for PA request.  Stacey CROFT CMA ([facility])

## 2018-09-18 ENCOUNTER — OFFICE VISIT (OUTPATIENT)
Dept: FAMILY MEDICINE | Facility: CLINIC | Age: 68
End: 2018-09-18
Payer: COMMERCIAL

## 2018-09-18 ENCOUNTER — TRANSFERRED RECORDS (OUTPATIENT)
Dept: HEALTH INFORMATION MANAGEMENT | Facility: CLINIC | Age: 68
End: 2018-09-18

## 2018-09-18 ENCOUNTER — PATIENT OUTREACH (OUTPATIENT)
Dept: GERIATRIC MEDICINE | Facility: CLINIC | Age: 68
End: 2018-09-18

## 2018-09-18 VITALS
SYSTOLIC BLOOD PRESSURE: 132 MMHG | HEART RATE: 123 BPM | TEMPERATURE: 98.1 F | DIASTOLIC BLOOD PRESSURE: 86 MMHG | WEIGHT: 293 LBS | BODY MASS INDEX: 56.85 KG/M2 | RESPIRATION RATE: 16 BRPM | OXYGEN SATURATION: 92 %

## 2018-09-18 DIAGNOSIS — Z13.89 SCREENING FOR DIABETIC PERIPHERAL NEUROPATHY: ICD-10-CM

## 2018-09-18 DIAGNOSIS — R07.0 THROAT PAIN: Primary | ICD-10-CM

## 2018-09-18 DIAGNOSIS — L01.00 IMPETIGO: ICD-10-CM

## 2018-09-18 DIAGNOSIS — Z23 NEED FOR PROPHYLACTIC VACCINATION AGAINST STREPTOCOCCUS PNEUMONIAE (PNEUMOCOCCUS): ICD-10-CM

## 2018-09-18 DIAGNOSIS — Z12.31 VISIT FOR SCREENING MAMMOGRAM: ICD-10-CM

## 2018-09-18 LAB
DEPRECATED S PYO AG THROAT QL EIA: NORMAL
SPECIMEN SOURCE: NORMAL

## 2018-09-18 PROCEDURE — 87081 CULTURE SCREEN ONLY: CPT | Performed by: INTERNAL MEDICINE

## 2018-09-18 PROCEDURE — 87880 STREP A ASSAY W/OPTIC: CPT | Performed by: INTERNAL MEDICINE

## 2018-09-18 PROCEDURE — 99207 C FOOT EXAM  NO CHARGE: CPT | Performed by: INTERNAL MEDICINE

## 2018-09-18 PROCEDURE — 99213 OFFICE O/P EST LOW 20 MIN: CPT | Performed by: INTERNAL MEDICINE

## 2018-09-18 NOTE — PATIENT INSTRUCTIONS
[facility]    If you have any questions regarding to your visit please contact your care team:     Team Pink:   Clinic Hours Telephone Number   Internal Medicine:  Dr. Jeanie Decker NP 7am-7pm  Monday - Thursday   7am-5pm  Fridays  [phone]  (Appointment scheduling available 24/7)   Urgent Care - [place] and [facility] - 11am-9pm Monday-Friday Saturday-Sunday- 9am-5pm   [place] - 5pm-9pm Monday-Friday Saturday-Sunday- 9am-5pm  [phone] - [place]  [phone] - [place]       What options do I have for a visit other than an office visit? We offer electronic visits (e-visits) and telephone visits, when medically appropriate.  Please check with your medical insurance to see if these types of visits are covered, as you will be responsible for any charges that are not paid by your insurance.      You can use [employer] (secure electronic communication) to access to your chart, send your primary care provider a message, or make an appointment. Ask a team member how to get started.     For a price quote for your services, please call our Consumer Price Line at [phone] or our Imaging Cost estimation line at [phone] (for imaging tests).  Suzy POWELL CMA ([facility])

## 2018-09-18 NOTE — PROGRESS NOTES
[facility] Care Coordination Contact  Received after visit chart from care coordinator.  Completed following tasks: Mailed copy of care plan to client, Updated services in access, Entered MMIS and faxed JERE to HIMs, mailed member health plan quick contact list and list of hearing and eye clinics, and the number for assistance scheduling a dental appt.   Chart was returned to .     Emely Espinoza  CMS Supervisor  [facility]  [phone]

## 2018-09-18 NOTE — LETTER
[facility]  [address] [place] [address]    September 20, 2018    [facility]  [address]    [place] [address]          Dear Marina,    Rapid strep test and back up throat culture are negative for streptococcal pharyngitis     Enclosed is a copy of your results.     Results for orders placed or performed in visit on 09/18/18   Strep, Rapid Screen   Result Value Ref Range    Specimen Description Throat     Rapid Strep A Screen       NEGATIVE: No Group A streptococcal antigen detected by immunoassay, await culture report.   Beta strep group A culture   Result Value Ref Range    Specimen Description Throat     Culture Micro No beta hemolytic Streptococcus Group A isolated        If you have any questions or concerns, please call myself or my nurse at [phone].      Sincerely,        Mateus Roberson MD/case

## 2018-09-18 NOTE — PROGRESS NOTES
SUBJECTIVE:   Marina Valdez is a 68 year old female who presents to clinic today for the following health issues:    Sore throat  Marina reports a scratchy, awful sore throat that began a week ago. She notes no improvement since. She feels her mouth is sore and even her teeth as well. Her soreness is worse in the evening. She feels congestion and says her head hurts. Denies coughing. She has associated acne that began around the same time as her other symptoms. She admits that she has been picking at the acne.    Patient is morbidly obese and has diabetes, she denies any numbness or tingling in her feet.    Problem list and histories reviewed & adjusted, as indicated.  Additional history: as documented    Patient Active Problem List   Diagnosis     Hypertension goal BP (blood pressure) < 140/90     Recurrent genital HSV (herpes simplex virus) infection     Intertrigo     Ovarian Mass s/p excision in 2008     Sebaceous cyst     Microalbuminuria     Hyperlipidemia with target LDL less than 100     Chest pain at rest x1 episode- resolved at rest 3 hours, assoc with food     Obstructive sleep apnea (on cpap)     24 hour contact given to patient      Hydradenitis     CKD (chronic kidney disease) stage 3, GFR 30-59 ml/min     Compulsive skin picking     Need for prophylactic vaccination and inoculation against influenza     High risk OTC medication or supplement use     Rotator cuff tear     Morbid obesity due to excess calories (H)     Chronic bilateral low back pain without sciatica     Primary osteoarthritis of both hips     Schizophrenia, unspecified type (H)     Uncontrolled type 2 diabetes mellitus with stage 3 chronic kidney disease, with long-term current use of insulin (H)     Endometrial cancer, grade I (H)     Impetigo     Health Care Home     Pruritus ani     Past Surgical History:   Procedure Laterality Date     cataract      bilateral     CYSTOSCOPY N/A 12/29/2016    Procedure: CYSTOSCOPY;  Surgeon:  Franca Rousseau MD;  Location: [facility] OR     DAVINCI HYSTERECTOMY TOTAL, BILATERAL SALPINGO-OOPHORECTOMY, COMBINED N/A 12/29/2016    Procedure: COMBINED DAVINCI HYSTERECTOMY TOTAL, SALPINGO-OOPHORECTOMY;  Surgeon: Franca Rousseau MD;  Location: [facility] OR     LAPAROSCOPY      for endometriosis     OOPHORECTOMY      right     WRIST SURGERY      right       Social History   Substance Use Topics     Smoking status: Former Smoker     Packs/day: 1.00     Years: 0.00     Types: Cigarettes     Quit date: 1/1/1990     Smokeless tobacco: Never Used      Comment: quit 1990     Alcohol use No     Family History   Problem Relation Age of Onset     HEART DISEASE Mother      Diabetes Mother      Hypertension Mother      Psychotic Disorder Mother      schitzophrenia     Hypertension Father      Blood Disease Father      high iron level     HEART DISEASE Maternal Grandmother      Cerebrovascular Disease Maternal Grandmother      HEART DISEASE Maternal Grandfather      Breast Cancer Paternal Grandmother      Breast Cancer Sister      HEART DISEASE Brother      Ovarian Cancer Paternal Aunt      Kidney Cancer Daughter      Uterine Cancer Daughter      Uterine Cancer Sister          BP Readings from Last 3 Encounters:   09/18/18 132/86   08/31/18 120/74   03/12/18 132/80    Wt Readings from Last 3 Encounters:   09/18/18 (!) 151 kg (333 lb)   08/31/18 (!) 150.6 kg (332 lb)   03/12/18 (!) 149.2 kg (329 lb)         Reviewed and updated as needed this visit by clinical staff       Reviewed and updated as needed this visit by Provider       ROS:  Constitutional, HEENT, cardiovascular, pulmonary, GI, , musculoskeletal, neuro, skin, endocrine and psych systems are negative, except as otherwise noted.    This document serves as a record of the services and decisions personally performed and made by Mateus Roberson MD. It was created on his behalf by Alfonso Dow, a trained medical scribe. The creation of this document is based on  the provider's statements to the medical scribe.  Alfonso Dow 1:58 PM September 18, 2018    OBJECTIVE:     /86  Pulse 123  Temp 98.1  F (36.7  C) (Oral)  Resp 16  Wt (!) 151 kg (333 lb)  LMP 03/24/2010  SpO2 92%  BMI 56.85 kg/m2  Body mass index is 56.85 kg/(m^2).  GENERAL: healthy, alert and no distress  EYES: Eyes grossly normal to inspection, PERRL and conjunctivae and sclerae normal  HENT: post oropharyngeal erythema without whitish patches or tonsillar exudates   SKIN: she has multiple pimples scattered across her face with some slight crusting and signs of inflammation / infection   NEURO: Normal strength and tone, mentation intact and speech normal  PSYCH: mentation appears normal, affect normal/bright    Diagnostic Test Results:  Results for orders placed or performed in visit on 09/18/18 (from the past 24 hour(s))   Strep, Rapid Screen   Result Value Ref Range    Specimen Description Throat     Rapid Strep A Screen       NEGATIVE: No Group A streptococcal antigen detected by immunoassay, await culture report.       ASSESSMENT/PLAN:     (R07.0) Throat pain  (primary encounter diagnosis)  Comment: viral pharyngitis / uncertain . Symptoms measures reviewed . Treatment with Magic mouthwash   Plan: Strep, Rapid Screen, Beta strep group A         culture, Diphenhyd-HC-Nystatin-Tetracyc         (FIRST-ANDRES MOUTHWASH) SUSP        Follow up depending on how things go     (L01.00) Impetigo  Comment: very challenging patient patient with schizophrenia  And compulsive skin picking which greatly worsens her developments of skin infections  Plan: cephalexin (KEFLEX) 250 MG capsule            (Z12.31) Visit for screening mammogram  Comment: rescheduled   Plan:     (Z13.89) Screening for diabetic peripheral neuropathy  Comment:   Plan: FOOT EXAM            (Z23) Need for prophylactic vaccination against Streptococcus pneumoniae (pneumococcus)  Comment:   Immunization History   Administered Date(s)  Administered     Influenza (H1N1) 11/24/2009     Influenza (High Dose) 3 valent vaccine 08/31/2018     Influenza (IIV3) PF 09/13/2010, 09/07/2011, 09/15/2012, 01/26/2013, 09/16/2013, 09/30/2014, 09/02/2015, 09/07/2016     Influenza vaccine ages 6-35 months 09/15/2017     Pneumo Conj 13-V (2010&after) 06/14/2016     Pneumococcal 23 valent 03/25/2013     TDAP Vaccine (Adacel) 02/23/2010     Zoster vaccine recombinant adjuvanted (SHINGRIX) 08/31/2018     Zoster vaccine, live 09/15/2012, 01/26/2013      Plan: not needed    See Patient Instructions    The information in this document, created by the medical scribe for me, accurately reflects the services I personally performed and the decisions made by me. I have reviewed and approved this document for accuracy prior to leaving the patient care area.  September 18, 2018 1:58 PM    Mateus Roberson MD  [facility]

## 2018-09-18 NOTE — LETTER
September 18, 2018    DOMINIC CHIANG  [address]    [place] [address]     Dear Dominic,    At [facility], we re dedicated to improving the health and well-being of our members.  Enclosed you will find the Comprehensive Care Plan that was developed with you on 09/12/2018. Please review the Care Plan carefully.    As a reminder, some of the things we discussed at your visit include:    Ways to improve or maintain your physical health such as walking 20 minutes a day.     Ways to reduce the risk of falls.     Health care needs you may have.     Don t forget to contact your care coordinator if you:    Have been hospitalized or plan to be hospitalized.     Have experienced a fall.      Have experienced a change in physical health, which may include bladder control and pain issues.      Are experiencing emotional problems.     If you do not agree with your Care Plan, have questions about it, or have experienced a change in your needs, please contact me, your care coordinator, at [phone]. If you are hearing impaired, please call the [place] [employer] at 702 or [phone] (lcugjr-cf-odpuor relay service).    Sincerely,      Pamela Shahid [facility]     MSC+ [id number]                                        (12/16)

## 2018-09-18 NOTE — MR AVS SNAPSHOT
After Visit Summary   9/18/2018    Marina Valdez    MRN: [id number]           Patient Information     Date Of Birth          1950        Visit Information        Provider Department      9/18/2018 1:50 PM Mateus Roberson MD [facility]        Today's Diagnoses     Throat pain    -  1    Impetigo        Visit for screening mammogram        Screening for diabetic peripheral neuropathy        Need for prophylactic vaccination against Streptococcus pneumoniae (pneumococcus)          Care Instructions    [facility]    If you have any questions regarding to your visit please contact your care team:     Team Pink:   Clinic Hours Telephone Number   Internal Medicine:  Dr. Jeanie Decker NP 7am-7pm  Monday - Thursday   7am-5pm  Fridays  [phone]  (Appointment scheduling available 24/7)   Urgent [facility] - [place] [facility] - 11am-9pm Monday-Friday Saturday-Sunday- 9am-5pm   [place] - 5pm-9pm Monday-Friday Saturday-Sunday- 9am-5pm  [phone] - [place]  [phone] - [place]       What options do I have for a visit other than an office visit? We offer electronic visits (e-visits) and telephone visits, when medically appropriate.  Please check with your medical insurance to see if these types of visits are covered, as you will be responsible for any charges that are not paid by your insurance.      You can use [employer] (secure electronic communication) to access to your chart, send your primary care provider a message, or make an appointment. Ask a team member how to get started.     For a price quote for your services, please call our Consumer Price Line at [phone] or our Imaging Cost estimation line at [phone] (for imaging tests).  Suzy POWELL CMA ([facility])            Follow-ups after your visit        Who to contact     If you have questions or need follow up information about today's clinic visit or  your schedule please contact [facility] directly at [phone].  Normal or non-critical lab and imaging results will be communicated to you by MyChart, letter or phone within 4 business days after the clinic has received the results. If you do not hear from us within 7 days, please contact the clinic through MyChart or phone. If you have a critical or abnormal lab result, we will notify you by phone as soon as possible.  Submit refill requests through [employer] or call your pharmacy and they will forward the refill request to us. Please allow 3 business days for your refill to be completed.          Additional Information About Your Visit        Care EveryWhere ID     This is your Care EveryWhere ID. This could be used by other organizations to access your [place] medical records  [id number]        Your Vitals Were     Pulse Temperature Respirations Last Period Pulse Oximetry BMI (Body Mass Index)    123 98.1  F (36.7  C) (Oral) 16 03/24/2010 92% 56.85 kg/m2       Blood Pressure from Last 3 Encounters:   09/18/18 132/86   08/31/18 120/74   03/12/18 132/80    Weight from Last 3 Encounters:   09/18/18 (!) 333 lb (151 kg)   08/31/18 (!) 332 lb (150.6 kg)   03/12/18 (!) 329 lb (149.2 kg)              We Performed the Following     Beta strep group A culture     FOOT EXAM     Strep, Rapid Screen          Today's Medication Changes          These changes are accurate as of 9/18/18  2:06 PM.  If you have any questions, ask your nurse or doctor.               Start taking these medicines.        Dose/Directions    cephalexin 250 MG capsule   Commonly known as:  KEFLEX   Used for:  Impetigo   Started by:  Mateus Roberson MD        Dose:  250 mg   Take 1 capsule (250 mg) by mouth 4 times daily for 8 days   Quantity:  32 capsule   Refills:  0       FIRST-ANDRES MOUTHWASH Susp   Used for:  Throat pain   Started by:  Mateus Roberson MD        Dose:  5-10 mL   Swish and swallow 5-10 mLs in mouth every 6 hours as  needed   Quantity:  237 mL   Refills:  1            Where to get your medicines      These medications were sent to [facility] [place], [place] - [address]  [address], [place] [address]     Phone:  [phone]     cephalexin 250 MG capsule    FIRST-ANDRES MOUTHWASH Susp                Primary Care Provider Office Phone # Fax #    Mateus Roberson [phone] [address]  [place] [address]        Equal Access to Services     [facility]: Hadii aad ku hadasho Soomaali, waaxda luqadaha, qaybta kaalmada adeegyada, waxay idiin hayaan adeeg kharash laclif . So [facility] [phone].    ATENCIÓN: Si habla español, tiene a garza disposición servicios gratuitos de asistencia lingüística. [facility] [phone].    We comply with applicable federal civil rights laws and [place] laws. We do not discriminate on the basis of race, color, national origin, age, disability, sex, sexual orientation, or gender identity.            Thank you!     Thank you for choosing [facility]  for your care. Our goal is always to provide you with excellent care. Hearing back from our patients is one way we can continue to improve our services. Please take a few minutes to complete the written survey that you may receive in the mail after your visit with us. Thank you!             Your Updated Medication List - Protect others around you: Learn how to safely use, store and throw away your medicines at www.disposemymeds.org.          This list is accurate as of 9/18/18  2:06 PM.  Always use your most recent med list.                   Brand Name Dispense Instructions for use Diagnosis    acetaminophen 500 MG tablet    TYLENOL     Take 500 mg by mouth        aspirin 81 MG tablet     100 tablet    Take 1 tablet (81 mg) by mouth daily    Type 2 diabetes, HbA1C goal < 8% (H)       BENADRYL 25 MG tablet   Generic drug:  diphenhydrAMINE      Take 25 mg by mouth At  Bedtime And 25 mg nightly as needed        blood glucose monitoring lancets     300 each    Use to test blood sugars 3 times daily or as directed. Qty of 1 box= 100 lancets    Uncontrolled type 2 diabetes mellitus with stage 3 chronic kidney disease, with long-term current use of insulin (H)       * blood glucose monitoring test strip    no brand specified    300 strip    Use to test blood sugars 3 times daily or as directed    Uncontrolled type 2 diabetes mellitus with stage 3 chronic kidney disease, with long-term current use of insulin (H)       * blood glucose monitoring test strip    ONETOUCH VERIO IQ    100 strip    TEST THREE TIMES DAILY OR AS DIRECTED    Uncontrolled type 2 diabetes mellitus with stage 3 chronic kidney disease, with long-term current use of insulin (H)       cephalexin 250 MG capsule    KEFLEX    32 capsule    Take 1 capsule (250 mg) by mouth 4 times daily for 8 days    Impetigo       cholecalciferol 1000 units Tabs      Take 1 tablet by mouth daily        FIRST-ANDRES MOUTHWASH Susp     237 mL    Swish and swallow 5-10 mLs in mouth every 6 hours as needed    Throat pain       fluconazole 100 MG tablet    DIFLUCAN    7 tablet    Diflucan 100 mg by mouth x 1 time a day x 7 days.    Candidal intertrigo       GEODON 80 MG capsule   Generic drug:  ziprasidone      Take 160 mg by mouth every morning And 80 mg daily at 10 AM, and 80 mg nightly as needed        hydrocortisone valerate 0.2 % ointment    WEST-JOE    15 g    Apply sparingly to affected area three times daily for 14 days.    Eczema, unspecified type, Compulsive skin picking, Impetigo       insulin glargine U-300 300 UNIT/ML injection    TOUJEO SOLOSTAR    27 mL    INJECT 100 UNITS UNDER THE SKIN AT BEDTIME NEED TO SEE PROVIDER AND HAVE LABS FOR FURTHER REFILLS    Uncontrolled type 2 diabetes mellitus with stage 3 chronic kidney disease, with long-term current use of insulin (H)       insulin lispro 100 UNIT/ML injection    HumaLOG  KWIKpen    3 mL    Inject 15 Units Subcutaneous 3 times daily (before meals)    Uncontrolled type 2 diabetes mellitus with stage 3 chronic kidney disease, with long-term current use of insulin (H)       insulin pen needle 31G X 8 MM    B-D U/F    500 each    Use 5 pen needles daily or as directed.    Uncontrolled type 2 diabetes mellitus with stage 3 chronic kidney disease, with long-term current use of insulin (H)       liraglutide 18 MG/3ML soln    VICTOZA    9 mL    Inject 0.6mg daily x 1 week, then increased to 1.2mg daily x 1 week, then increase to 1.8mg daily which is the maintainence dose    Uncontrolled type 2 diabetes mellitus with stage 3 chronic kidney disease, with long-term current use of insulin (H)       nystatin 166472 UNIT/GM Powd    MYCOSTATIN    120 g    APPLY TO GROIN AND UNDER BREAST TWICE DAILY UNTIL REDNESS HAS RESOLVED.    Candidal intertrigo       order for DME     1 Device    Equipment being ordered: CPAP and associated supplies and tubing    Obstructive sleep apnea       * order for DME     1 each    Equipment being ordered: Lift Chair    Morbid obesity due to excess calories (H), Chronic bilateral low back pain without sciatica       * order for DME     1 Device    Equipment being ordered: corset to prevent compulsive skin picking of abdomen    Compulsive skin picking       * order for DME     1 Device    Equipment being ordered: CPAP machine set at 15 pressure, heated humidifier, supplies: mask and tubing ( supplies)    Obstructive sleep apnea       * order for DME     1 Device    Equipment being ordered: CPAP    Obstructive sleep apnea       * order for DME     1 Device    Equipment being ordered: home blood pressure cuff for home blood pressure monitoring    Hypertension goal BP (blood pressure) < 140/90       order for DME     3 Month    Equipment being ordered: Women's incontinence pads/liners, 3 per day    Urinary incontinence, unspecified type       PARoxetine 40 MG tablet    PAXIL      Take 80 mg by mouth daily. 80mg=2 tablets.        pravastatin 40 MG tablet    PRAVACHOL    90 tablet    TAKE 1 TABLET (40 MG) BY MOUTH DAILY    Hyperlipidemia LDL goal <100       senna-docusate 8.6-50 MG per tablet    SENOKOT-S;PERICOLACE     Take 2 tablets by mouth 2 times daily as needed for constipation        traMADol 50 MG tablet    ULTRAM    60 tablet    TAKE 1 TABLET BY MOUTH TWICE A DAY AS NEEDED FOR MODERATE PAIN    Chronic bilateral low back pain without sciatica       triamcinolone 0.1 % cream    KENALOG    30 g    Apply sparingly to affected area three times daily as needed    Pruritus ani       valACYclovir 500 MG tablet    VALTREX    90 tablet    TAKE 1 TABLET (500 MG) BY MOUTH DAILY    Recurrent genital HSV (herpes simplex virus) infection       valsartan-hydrochlorothiazide 160-25 MG per tablet    DIOVAN-HCT    90 tablet    Take 1 tablet by mouth daily    Uncontrolled type 2 diabetes mellitus with stage 3 chronic kidney disease, with long-term current use of insulin (H)       * Notice:  This list has 7 medication(s) that are the same as other medications prescribed for you. Read the directions carefully, and ask your doctor or other care provider to review them with you.

## 2018-09-19 LAB
BACTERIA SPEC CULT: NORMAL
SPECIMEN SOURCE: NORMAL

## 2018-09-20 ENCOUNTER — MEDICAL CORRESPONDENCE (OUTPATIENT)
Dept: HEALTH INFORMATION MANAGEMENT | Facility: CLINIC | Age: 68
End: 2018-09-20

## 2018-09-20 ENCOUNTER — TELEPHONE (OUTPATIENT)
Dept: INTERNAL MEDICINE | Facility: CLINIC | Age: 68
End: 2018-09-20

## 2018-09-20 NOTE — TELEPHONE ENCOUNTER
Reason for Call:  Other prescription    Detailed comments:  Patient calling. She is to take victoza. She stopped using sugar. Her blood sugar this morning is 119. Should she start using the victoza?     Phone Number Patient can be reached at: Home number on file [phone] (home)    Best Time:  Any     Can we leave a detailed message on this number? YES    Call taken on 9/20/2018 at 9:14 AM by Daisy Ramos

## 2018-09-24 ENCOUNTER — TELEPHONE (OUTPATIENT)
Dept: FAMILY MEDICINE | Facility: CLINIC | Age: 68
End: 2018-09-24

## 2018-09-24 NOTE — TELEPHONE ENCOUNTER
Reason for Call:  Other call back    Detailed comments: Patient is wondering if sciatic never issues are contagious. Please advise.     Phone Number Patient can be reached at: Home number on file [phone] (home)    Best Time: any     Can we leave a detailed message on this number? YES    Call taken on 9/24/2018 at 11:22 AM by Dereje Baxter

## 2018-09-24 NOTE — TELEPHONE ENCOUNTER
Per patient, she knows several people in the family with sciatic nerve issues (Sciatica) and she is wondering if it is contagious  Explained to her that this is not contagious  Patient verbalized understanding.    Harshal Hayward RN

## 2018-09-25 RX ORDER — PEN NEEDLE, DIABETIC 31 GX5/16"
NEEDLE, DISPOSABLE MISCELLANEOUS
Qty: 500 EACH | Refills: 3 | Status: SHIPPED | OUTPATIENT
Start: 2018-09-25

## 2018-09-26 ENCOUNTER — TELEPHONE (OUTPATIENT)
Dept: FAMILY MEDICINE | Facility: CLINIC | Age: 68
End: 2018-09-26

## 2018-09-27 ENCOUNTER — TELEPHONE (OUTPATIENT)
Dept: INTERNAL MEDICINE | Facility: CLINIC | Age: 68
End: 2018-09-27

## 2018-09-27 NOTE — TELEPHONE ENCOUNTER
Reason for call:  Other   Patient called regarding (reason for call): call back  Additional comments: Patient called stating that she is on 3 different insulins, but has only been taking 1 of them which is, Toujeo Solostar because it say's to not take with any other insulin. So for the past couple of days she has not taken her other two insulins Victoza or Humalog Kwikpen. Patient states that she feels better than she has ever felt before and her blood sugar has been within normal range, readings are 119 and 175. She refused triage to get clarification and want's Dr. Roberson to call her back. Thanks!    Phone number to reach patient:  Home number on file [phone] (home)    Best Time:  anytime    Can we leave a detailed message on this number?  YES\

## 2018-09-27 NOTE — TELEPHONE ENCOUNTER
Called Buffy back and updated her on Dr. Roberson's message below  Per Buffy, patient did not have any issues before while on the Toujeo or Humalog but after reading the label warning recently, she is suddenly reporting that using the meds together made her feel unwell.  Buffy agreed that this may be related to patient's mental health   She will explain Dr. Roberson's message to patient and advise her to take all 3 meds and monitor BG TID  She will call back if patient agrees to meet with DM Ed- referral parrish up    Copied from 9/27/18 phone encounter    Reason for Call:  Other clarification on insulins                      Detailed comments: Per home care nurse-Patient has been taking krishna & humalog. She has not started the victoza yet. Patient is hesitant to start the Victoza as she stopped taking her Humalog yesterday and has felt better today.  Please call home care to advise what medications patient should be taking.     Phone Number Patient can be reached at: Other phone number:  [phone]     Best Time: any     Can we leave a detailed message on this number? YES     Call taken on 9/27/2018 at 2:07 PM by Siomara Kendall

## 2018-09-27 NOTE — TELEPHONE ENCOUNTER
Detailed message left on patient's VM advising her the Victoza is not an insulin. It was prescribed to help with weight loss and diabetic control  Explained that Toujeo is long acting insulin and Humalog is short acting  So all 3 meds work differently and would recommend she be on all 3     Requested that she call the RN hotline back to discuss this further- see Dr. Roberson's note below    DM referral chris'd up    Harshal Hayward RN

## 2018-09-27 NOTE — TELEPHONE ENCOUNTER
She's not on three insulins she's on two. Liraglutide [ Victoza ] is not insulin.    She's on appropriate medication     1. Long acting insulin also known as Toujeo (insulin glargine U300)   2. Short acting insulin also known as Humalog KwikPen   3. Liraglutide [ Victoza ] which was recently started and is a (glucagon-like peptide-1) GLP-1 agonist which I suggested for treatment only at the last appointment due to our inability to control her diabetes mellitus. The goal of Liraglutide [ Victoza ] was help her with weight loss and she was to be titrated gradually up to the 1.8 milligram dose. Not sure what dose she ever got to prior to saying she's stopped this. I would wonder; if she ever did actually try Liraglutide [ Victoza ] or not. If she did, possibly she saw some benefit from this and now thinks everything is great.    Marina has schizophrenia and her report of facts are under some cloud regarding accuracy of her information. I only recommend the following plan. I don't think a telephone MD visit is worth her while.  She wants permission to only use Toujeo (insulin glargine U300) and I don't think this is sound. If she persists with this we will say    Continue with your plan  We need recheck hemoglobin a1c  [ diabetes test ] in 3 months   In the meantime I insist patient send us her blood glucose readings and I recommend checking them 3 times a day.  If she's getting blood glucose readings greater then 250 we know this plan is a failure which is what we expect.   We then ask her to resume normal treatment.  Offer / also recommend follow up with a certified diabetes educator      Mateus Roberson MD

## 2018-09-27 NOTE — TELEPHONE ENCOUNTER
Patient requesting discussion with DR. Roberson  Does this need to be a phone visit?    Harshal Hayward RN

## 2018-09-27 NOTE — TELEPHONE ENCOUNTER
Reason for Call:  Other clarification on insulins     Detailed comments: Per home care nurse-Patient has been taking krishna & humalog. She has not started the victoza yet. Patient is hesitant to start the Victoza as she stopped taking her Humalog yesterday and has felt better today.  Please call home care to advise what medications patient should be taking.    Phone Number Patient can be reached at: Other phone number:  [phone]    Best Time: any    Can we leave a detailed message on this number? YES    Call taken on 9/27/2018 at 2:07 PM by Siomara Kendall

## 2018-10-01 ENCOUNTER — TELEPHONE (OUTPATIENT)
Dept: INTERNAL MEDICINE | Facility: CLINIC | Age: 68
End: 2018-10-01

## 2018-10-01 DIAGNOSIS — G89.29 CHRONIC BILATERAL LOW BACK PAIN WITHOUT SCIATICA: ICD-10-CM

## 2018-10-01 DIAGNOSIS — R52 PAIN OF RIGHT SIDE OF BODY: Primary | ICD-10-CM

## 2018-10-01 DIAGNOSIS — M54.50 CHRONIC BILATERAL LOW BACK PAIN WITHOUT SCIATICA: ICD-10-CM

## 2018-10-01 RX ORDER — TRAMADOL HYDROCHLORIDE 50 MG/1
TABLET ORAL
Qty: 60 TABLET | Refills: 0 | Status: SHIPPED | OUTPATIENT
Start: 2018-10-01 | End: 2018-12-31

## 2018-10-01 NOTE — TELEPHONE ENCOUNTER
Called and spoke with Buffy.  She stated that she did talk with patient regarding the insulins and Victoza.  Patient agreed with plan  Per Buffy, patient wanted to think about the DM referral     Called patient and encouraged her to f/u with DM ed.  Patient agreed    Referral placed.   Phone number from referral given    Harshal Hayward RN

## 2018-10-01 NOTE — TELEPHONE ENCOUNTER
Patient notified.  Patient agreed with plan  Ortho referral placed.  Phone number from referral given    Harshal Hayward RN

## 2018-10-01 NOTE — TELEPHONE ENCOUNTER
[place] reviewed:  reviewed- no concerns    [place]:  [email or web address]    Nadia Calderon RN - BC

## 2018-10-01 NOTE — TELEPHONE ENCOUNTER
Reason for call:  Patient reporting a symptom    Symptom or request: Pain in front side    Duration (how long have symptoms been present): On going for a while      Have you been treated for this before?no    Additional comments: Pt is having left side pain worse now excruciating can hardly walk please advise  Seemed better when on antibiotic for impetigo. Transferred to nurse.     Phone Number patient can be reached at:  Home number on file [phone] (home)    Best Time:  ASAP    Can we leave a detailed message on this number:  YES    Call taken on 10/1/2018 at 12:05 PM by Mendy Haro

## 2018-10-01 NOTE — TELEPHONE ENCOUNTER
Recommended to have orthopedic follow up  , possibly needle EMG examination     Mateus Roberson MD

## 2018-10-01 NOTE — TELEPHONE ENCOUNTER
Per patient, she has been having right sided waist pain described as an achy feeling x months now  Pain is constant.   Pain is 7/10 on pain scale at its worse and 5/10 after taking Tylenol or using cold packs  Denies any injury to the area  No rash, blisters, or wounds noted to the area.  She noted that when she was on abx for impetigo, the pain to her side also improved    Advised her to be seen for further assessment  Offered an appointment but patient wanted to call back on her own to schedule  She also wanted message sent to Dr. Roberson as KAROL Hayward, VERONICA

## 2018-10-01 NOTE — TELEPHONE ENCOUNTER
Controlled Substance Refill Request for traMADol (ULTRAM) 50 MG tablet  Problem List Complete:  No     PROVIDER TO CONSIDER COMPLETION OF PROBLEM LIST AND OVERVIEW/CONTROLLED SUBSTANCE AGREEMENT    Last Written Prescription Date:    Last Fill Quantity:60 ,   # refills: 0    Last Office Visit with [facility] primary care provider: 09/18/2018  Future Office visit:     Controlled substance agreement on file: No.     Processing:  unknown   checked in past 3 months?  No, route to RN

## 2018-10-02 ENCOUNTER — TELEPHONE (OUTPATIENT)
Dept: INTERNAL MEDICINE | Facility: CLINIC | Age: 68
End: 2018-10-02

## 2018-10-02 NOTE — TELEPHONE ENCOUNTER
Reason for Call:  Other prescription    Detailed comments:  Patient calling. She is on Victoza and Trulicity. What is she to do and take. Please call her and let know.     Phone Number Patient can be reached at: Home number on file [phone] (home)    Best Time:  Any     Can we leave a detailed message on this number? YES    Call taken on 10/2/2018 at 3:47 PM by Daisy Ramos

## 2018-10-03 NOTE — TELEPHONE ENCOUNTER
Trulicity was not covered so we switched this to Victoza- see 9/2/18 phone encounter    Spoke with patient and she is aware of this but stated that for some reason, the Trulicity was approved for coverage and the pharmacy dispensed this to her.  She had already started on Victoza and is still on the starting dose of 0.6mg    Advised her to put the Trulicity aside for now and continue on the Victoza as she has already started this.  Both were completely covered and patient did not have any out of pocket cost   She is fine with either    Please advise if you have a preference or if she should just continue on the Victoza  RN can call the pharmacy to cancel the Trulicity prescription if needed    Harshal Hayward RN

## 2018-10-03 NOTE — TELEPHONE ENCOUNTER
Since she has already obtained the Liraglutide [ Victoza ] this makes the most sense to me     Remember the planned Liraglutide [ Victoza ] titration     Liraglutide [ Victoza ] is generally started at 0.6mg daily x1 week, then is increased to 1.2mg for at least 1 week, then up to 1.8mg daily which is the maintainence dose.  If you have nausea/vomiting at any stage, then we would slow down the upward tapering with the dose.        Mateus Roberson MD

## 2018-10-04 NOTE — TELEPHONE ENCOUNTER
Called [facility] in [place] and LM on pharmacist's VM advising them to discontinue the Trulicity script and keep Liraglutide active     Called and updated patient on Dr. Roberson's message below  Patient verbalized understanding.      Harshal Hayward RN

## 2018-10-06 ENCOUNTER — TELEPHONE (OUTPATIENT)
Dept: FAMILY MEDICINE | Facility: CLINIC | Age: 68
End: 2018-10-06

## 2018-10-06 NOTE — TELEPHONE ENCOUNTER
Reason for Call:  Medication or medication refill:    Do you use a [facility]?  Name of the pharmacy and phone number for the current request:  [facility] [id number] [place] [address]    Name of the medication requested: Antibiotic    Other request: Per patient, was on antibiotic for her impetigo and she would like a precription for it if possible. She also states that she had a rash in her vaginal area which disappeared when she took the antibiotic but it is back now.    Can we leave a detailed message on this number? Not Applicable    Phone number patient can be reached at: Home number on file [phone] (home)    Best Time:     Call taken on 10/6/2018 at 12:24 PM by Ashley Selby

## 2018-10-06 NOTE — TELEPHONE ENCOUNTER
Reason for Call:  Medication or medication refill:     Do you use a [facility]?  Name of the pharmacy and phone number for the current request:  [facility] [id number] [place] [address]     Name of the medication requested: Antibiotic     Other request: Per patient, was on antibiotic for her impetigo and she would like a precription for it if possible. She also states that she had a rash in her vaginal area which disappeared when she took the antibiotic but it is back now.     Can we leave a detailed message on this number? Not Applicable     Phone number patient can be reached at: Home number on file [phone] (home)     Best Time:      Call taken on 10/6/2018 at 12:24 PM by Ashley Selby

## 2018-10-08 RX ORDER — VALSARTAN AND HYDROCHLOROTHIAZIDE 160; 25 MG/1; MG/1
TABLET ORAL
Qty: 90 TABLET | Refills: 0 | Status: SHIPPED | OUTPATIENT
Start: 2018-10-08 | End: 2018-12-31

## 2018-10-08 NOTE — TELEPHONE ENCOUNTER
Prescription approved per [facility] Refill Protocol.  Clementine Figueredo RN

## 2018-10-08 NOTE — TELEPHONE ENCOUNTER
It is simply impossible to make sense of this. Patient has compulsive skin picking , history of recurrent impetigo [ usually a streptococcal skin infection ] and a history of a vulvar malignancy and schizophrenia  . We can't answer these questions and she needs appointment. I am full I suspect, she can see who ever has openings    Mateus Roberson MD

## 2018-10-08 NOTE — TELEPHONE ENCOUNTER
Spoke with pt. States she just finished 8 days of abx. Is still taking the magic mouthwash.   Has drainage from her nose and it is going down the back of her throat. Throat doesn't hurt, but has drainage. Rash is on her bottom on the outside of the vagina. On abx it went away and as soon as she quit taking the abx, it came back again. Feels like it is not cured. No vaginal discharge.  No fever or chills.   Please advise.    Stacey Alexander RN  [facility]

## 2018-11-14 DIAGNOSIS — L29.0 PRURITUS ANI: ICD-10-CM

## 2018-11-15 ENCOUNTER — TELEPHONE (OUTPATIENT)
Dept: INTERNAL MEDICINE | Facility: CLINIC | Age: 68
End: 2018-11-15

## 2018-11-15 RX ORDER — TRIAMCINOLONE ACETONIDE 1 MG/G
CREAM TOPICAL
Qty: 30 G | Refills: 0 | Status: SHIPPED | OUTPATIENT
Start: 2018-11-15 | End: 2019-01-07

## 2018-11-15 NOTE — TELEPHONE ENCOUNTER
Reason for Call:  Medication or medication refill:    Do you use a [facility]?  Name of the pharmacy and phone number for the current request:    [facility] [address] [facility] [employer] [place], [place] - [address]  [address]  [place] [address]  Phone: [phone] Fax: [phone]        Name of the medication requested: Blood Glucose meter    Other request: patient lost her meter and asking for a prescription to be sent to the pharmacy.     Can we leave a detailed message on this number? YES    Phone number patient can be reached at: Home number on file [phone] (home)    Best Time: any time    Call taken on 11/15/2018 at 4:18 PM by Michelle Quinn

## 2018-11-17 DIAGNOSIS — E78.5 HYPERLIPIDEMIA LDL GOAL <100: ICD-10-CM

## 2018-11-19 ENCOUNTER — TELEPHONE (OUTPATIENT)
Dept: INTERNAL MEDICINE | Facility: CLINIC | Age: 68
End: 2018-11-19

## 2018-11-19 RX ORDER — PRAVASTATIN SODIUM 40 MG
TABLET ORAL
Qty: 90 TABLET | Refills: 0 | Status: SHIPPED | OUTPATIENT
Start: 2018-11-19 | End: 2019-02-15

## 2018-11-19 NOTE — TELEPHONE ENCOUNTER
Reason for Call:  Other Handicap sign    Detailed comments: patient's handicap sign will be expiring in February and would like it renewed. Patient asking for Dr. Roberson to help her renew this.     Phone Number Patient can be reached at: Home number on file [phone] (home)    Best Time: any time    Can we leave a detailed message on this number? YES    Call taken on 11/19/2018 at 1:59 PM by Michelle Quinn

## 2018-11-19 NOTE — TELEPHONE ENCOUNTER
"Requested Prescriptions   Pending Prescriptions Disp Refills     pravastatin (PRAVACHOL) 40 MG tablet [Pharmacy Med Name: PRAVASTATIN SODIUM 40 MG TAB] 90 tablet 0     Sig: TAKE 1 TABLET (40 MG) BY MOUTH DAILY    Statins Protocol Failed    11/19/2018  7:41 AM       Failed - LDL on file in past 12 months    Recent Labs   Lab Test  10/17/17   1151   LDL  29            Passed - No abnormal creatine kinase in past 12 months    No lab results found.            Passed - Recent (12 mo) or future (30 days) visit within the authorizing provider's specialty    Patient had office visit in the last 12 months or has a visit in the next 30 days with authorizing provider or within the authorizing provider's specialty.  See \"Patient Info\" tab in inbasket, or \"Choose Columns\" in Meds & Orders section of the refill encounter.             Passed - Patient is age 18 or older       Passed - No active pregnancy on record       Passed - No positive pregnancy test in past 12 months        Routing refill request to provider for review/approval because:  Labs not current:  LDL    Zarina Martínez RN            Zarina Martínez RN        "

## 2018-11-20 NOTE — TELEPHONE ENCOUNTER
Application for Disability Parking Certificate form printed from their website and given to Dr. Roberson to complete and sign. Camila Rudolph,

## 2018-11-26 DIAGNOSIS — A60.00 RECURRENT GENITAL HSV (HERPES SIMPLEX VIRUS) INFECTION: ICD-10-CM

## 2018-11-27 ENCOUNTER — MEDICAL CORRESPONDENCE (OUTPATIENT)
Dept: HEALTH INFORMATION MANAGEMENT | Facility: CLINIC | Age: 68
End: 2018-11-27

## 2018-11-28 RX ORDER — VALACYCLOVIR HYDROCHLORIDE 500 MG/1
TABLET, FILM COATED ORAL
Qty: 90 TABLET | Refills: 1 | Status: SHIPPED | OUTPATIENT
Start: 2018-11-28

## 2018-11-30 ENCOUNTER — TELEPHONE (OUTPATIENT)
Dept: FAMILY MEDICINE | Facility: CLINIC | Age: 68
End: 2018-11-30

## 2018-11-30 NOTE — TELEPHONE ENCOUNTER
Panel Management Review      Patient has the following on her problem list:     Diabetes    ASA: Passed    Last A1C  Lab Results   Component Value Date    A1C 10.0 08/31/2018    A1C 10.4 10/17/2017    A1C 8.4 07/10/2017    A1C 7.1 04/18/2017    A1C 9.4 11/11/2016     A1C tested: FAILED    Last LDL:    Lab Results   Component Value Date    CHOL 122 10/17/2017     Lab Results   Component Value Date    HDL 71 10/17/2017     Lab Results   Component Value Date    LDL 29 10/17/2017     Lab Results   Component Value Date    TRIG 109 10/17/2017     Lab Results   Component Value Date    CHOLHDLRATIO 2.2 07/09/2015     Lab Results   Component Value Date    NHDL 51 10/17/2017       Is the patient on a Statin? YES             Is the patient on Aspirin? YES    Medications     HMG CoA Reductase Inhibitors    pravastatin (PRAVACHOL) 40 MG tablet    Salicylates    aspirin 81 MG tablet          Last three blood pressure readings:  BP Readings from Last 3 Encounters:   09/18/18 132/86   08/31/18 120/74   03/12/18 132/80       Date of last diabetes office visit: 08/31/2018     Tobacco History:     History   Smoking Status     Former Smoker     Packs/day: 1.00     Years: 0.00     Types: Cigarettes     Quit date: 1/1/1990   Smokeless Tobacco     Never Used     Comment: quit 1990           Composite cancer screening  Chart review shows that this patient is due/due soon for the following Mammogram  Summary:    Patient is due/failing the following:   Eye exam, LDL and MAMMOGRAM    Action needed:   Patient needs office visit for diabetes.    Type of outreach:    Sent letter.    Questions for provider review:    None                                                                                                                                    Suzy POWELL CMA ([facility])       Chart routed to none .

## 2018-11-30 NOTE — LETTER
November 30, 2018          Marina Valdez,  [address]  [address]  [place] [address]        Dear Lorie Hvinden      Monitoring and managing your preventative and chronic health conditions are very important to us. Our records indicate that you have not scheduled for Mammogram, Diabetic Check and HgbA1C  which was recommended by Dr. Roberson.      If you have received your health care elsewhere, please call the clinic so the information can be documented in your chart.    Please call [phone] or message us through your [employer] account to schedule an appointment or provide information for your chart.     Feel free to contact us if you have any questions or concerns!    I look forward to seeing you and working with you on your health care needs.     Sincerely,       Your [facility] Team/Suzy POWELL CMA ([facility])

## 2018-12-17 ENCOUNTER — TELEPHONE (OUTPATIENT)
Dept: FAMILY MEDICINE | Facility: CLINIC | Age: 68
End: 2018-12-17

## 2018-12-17 NOTE — TELEPHONE ENCOUNTER
Received fax from pharmacy: patient is requesting new RX for the following medication.     fluticasone (FLONASE) 50 MCG/ACT nasal spray       Last Written Prescription Date:  12/14/2018  Last Fill Quantity: 1 package,   # refills: 11  Last Office Visit: 12/14/2018  Future Office visit:       Routing refill request to provider for review/approval because:  Drug not active on patient's medication list

## 2018-12-17 NOTE — TELEPHONE ENCOUNTER
send in one year prescription     Need chosen pharmacy and what is the diagnosis ?    Mateus Roberson MD

## 2018-12-17 NOTE — TELEPHONE ENCOUNTER
MA - please call the patient and/or pharmacy to confirm this was truly requested or was this an automatic refill request.    Nadia Calderon RN - BC

## 2018-12-20 ENCOUNTER — PATIENT OUTREACH (OUTPATIENT)
Dept: GERIATRIC MEDICINE | Facility: CLINIC | Age: 68
End: 2018-12-20

## 2018-12-20 NOTE — LETTER
December 20, 2018    DOMINIC CHIANG  [address]    [place] [address]    Dear Dominic,     As a member of [place] [employer] ([facility] you are assigned a case coordinator.  I will be your new case coordinator as of 1/1/19 because:    [] Your address has changed.  [] You have selected a new primary care clinic/physician.  [] Your previous case coordinator has left the agency.   [x] You have been reassigned.    If you have any questions, please feel free to call me at [phone]. If you reach my voice mail, please leave a message and your phone number. If you are hearing impaired, please call the [place] [employer] at 472 or [phone] (caswds-ab-nuwpou relay service).     I look forward to speaking with you soon.    Sincerely,       REGINA Grier    [facility] is a health plan that contracts with both Medicare and the [place] Medical Assistance (Medicaid) program to provide benefits of both programs to enrollees. Enrollment in [facility] depends on contract renewal.    [facility]  [id number] DHS Approved ([id number])          (01/17)

## 2018-12-20 NOTE — PROGRESS NOTES
Internal Care Coordination Transfer Eff 1/1/19.  Mailed Letter.    Marina Selby  Care Management Specialist  [facility]  [phone]

## 2018-12-24 DIAGNOSIS — Z91.09 ENVIRONMENTAL ALLERGIES: ICD-10-CM

## 2018-12-24 RX ORDER — FLUTICASONE PROPIONATE 50 MCG
1-2 SPRAY, SUSPENSION (ML) NASAL DAILY
Qty: 1 BOTTLE | Refills: 11 | Status: SHIPPED | OUTPATIENT
Start: 2018-12-24

## 2018-12-24 NOTE — TELEPHONE ENCOUNTER
Routing refill request to provider for review/approval because:  Drug not active on patient's medication list  A break in medication    Discontinued on 1/16/2017    Magda Tay RN

## 2018-12-24 NOTE — TELEPHONE ENCOUNTER
Fluticasone prop 50mcg spray      Last Written Prescription Date:  ?  Last Fill Quantity: ?,   # refills: ?  Last Office Visit: 09/18/2018  Future Office visit:       Routing refill request to provider for review/approval because:  Drug not active on patient's medication list

## 2018-12-26 NOTE — NURSING NOTE
"Chief Complaint   Patient presents with     RECHECK     Medication for Impetigo in ears  caused her an allergic reaction.  Had Swelling, itching and burning       Initial /74  Pulse 120  Temp 98.1  F (36.7  C) (Oral)  Wt (!) 312 lb (141.5 kg)  LMP 03/24/2010  SpO2 94%  BMI 53.27 kg/m2 Estimated body mass index is 53.27 kg/(m^2) as calculated from the following:    Height as of 4/18/17: 5' 4.17\" (1.63 m).    Weight as of this encounter: 312 lb (141.5 kg).  Medication Reconciliation: complete   Audelia Padilla MA    " English

## 2018-12-31 DIAGNOSIS — G89.29 CHRONIC BILATERAL LOW BACK PAIN WITHOUT SCIATICA: ICD-10-CM

## 2018-12-31 DIAGNOSIS — M54.50 CHRONIC BILATERAL LOW BACK PAIN WITHOUT SCIATICA: ICD-10-CM

## 2018-12-31 NOTE — TELEPHONE ENCOUNTER
Requested Prescriptions   Pending Prescriptions Disp Refills     valsartan-hydrochlorothiazide (DIOVAN HCT) 160-25 MG tablet 90 tablet 0     Sig: Take 1 tablet by mouth daily    There is no refill protocol information for this order        Last Written Prescription Date:  10/8/2018  Last Fill Quantity: 90,  # refills: 0   Last office visit: 9/18/2018 with prescribing provider:  Da Bautista Office Visit:

## 2018-12-31 NOTE — TELEPHONE ENCOUNTER
Controlled Substance Refill Request for traMADol (ULTRAM) 50 MG tablet  Problem List Complete:  No     PROVIDER TO CONSIDER COMPLETION OF PROBLEM LIST AND OVERVIEW/CONTROLLED SUBSTANCE AGREEMENT    Last Written Prescription Date:  10/1/2018  Last Fill Quantity: 60,   # refills: 0    Last Office Visit with [facility] primary care provider: 12/14/2018    Future Office visit:     Controlled substance agreement on file: No.     Processing:  NA   checked in past 3 months?  No, route to RN

## 2019-01-01 NOTE — TELEPHONE ENCOUNTER
Clinic Action Needed:Yes  Reason for Call:See triage notes below.  Routed to:Dr Da Salgado, RN  [facility]             You can access the [facility] Patient Portal, offered by [facility], by registering with the following website: [email or web address]

## 2019-01-03 ENCOUNTER — TELEPHONE (OUTPATIENT)
Dept: FAMILY MEDICINE | Facility: CLINIC | Age: 69
End: 2019-01-03

## 2019-01-03 PROBLEM — Z76.89 HEALTH CARE HOME: Status: ACTIVE | Noted: 2017-12-12

## 2019-01-03 RX ORDER — VALSARTAN AND HYDROCHLOROTHIAZIDE 160; 25 MG/1; MG/1
1 TABLET ORAL DAILY
Qty: 90 TABLET | Refills: 1 | Status: SHIPPED | OUTPATIENT
Start: 2019-01-03 | End: 2019-04-18 | Stop reason: DRUGHIGH

## 2019-01-03 NOTE — TELEPHONE ENCOUNTER
Reason for call:  Other   Patient called regarding (reason for call): prescription  Additional comments: Patient is calling regarding her medication Valsartan. She states she seen on the news to contact the doctor because there is something in the medication that can be hurtful. Patient is concerned for this and would like to talk more about this to the doctor about changing the script to something else for her blood pressure. Patient is wondering if she should stop taking this medication right away    Phone number to reach patient:  Home number on file [phone] (home)    Best Time:  any    Can we leave a detailed message on this number?  YES

## 2019-01-03 NOTE — TELEPHONE ENCOUNTER
Called patient   This was regarding the recent recall on Valsartan  Advised her to contact the dispensing pharmacy to have them check her prescription and see if hers is affected by the recall, if not, then she does not need to change  Patient verbalized understanding.    Harshal Hayward RN

## 2019-01-03 NOTE — TELEPHONE ENCOUNTER
[place] reviewed:  reviewed- no concerns    [place]:  [email or web address]    Nadia Calderon RN - BC

## 2019-01-04 RX ORDER — TRAMADOL HYDROCHLORIDE 50 MG/1
TABLET ORAL
Qty: 60 TABLET | Refills: 0 | Status: SHIPPED | OUTPATIENT
Start: 2019-01-04 | End: 2019-01-07

## 2019-01-07 ENCOUNTER — TELEPHONE (OUTPATIENT)
Dept: FAMILY MEDICINE | Facility: CLINIC | Age: 69
End: 2019-01-07

## 2019-01-07 DIAGNOSIS — L29.0 PRURITUS ANI: ICD-10-CM

## 2019-01-07 DIAGNOSIS — M54.50 CHRONIC BILATERAL LOW BACK PAIN WITHOUT SCIATICA: ICD-10-CM

## 2019-01-07 DIAGNOSIS — G89.29 CHRONIC BILATERAL LOW BACK PAIN WITHOUT SCIATICA: ICD-10-CM

## 2019-01-07 RX ORDER — CLOTRIMAZOLE AND BETAMETHASONE DIPROPIONATE 10; .64 MG/G; MG/G
CREAM TOPICAL 2 TIMES DAILY
Qty: 15 G | Refills: 0 | Status: SHIPPED | OUTPATIENT
Start: 2019-01-07 | End: 2019-07-12

## 2019-01-07 NOTE — TELEPHONE ENCOUNTER
Reason for Call:  Other prescription    Detailed comments: Patient states she was told to call dr Roberson if she was having trouble with her rectum. She would like medication called in to [facility] [id number] [place] [facility] [address]    Phone Number Patient can be reached at: Home number on file [phone] (home)    Best Time: ASAP    Can we leave a detailed message on this number? YES    Call taken on 1/7/2019 at 5:23 PM by Mendy Haro

## 2019-01-07 NOTE — TELEPHONE ENCOUNTER
"We can safely give nothing more then 2 weeks of treatment with clotrimazole-betamethasone (LOTRISONE) cream to be applied 2 times a day     We can wait 2 weeks for seeing improvement. If we are NOT getting this improvement then we need an exam either with me or with general surgery as patient has a history of a vulvar cancer and now I am concerned with things \" down there\". Lets go with general surgery consultation if no improvements.    Mateus Roberson MD    "

## 2019-01-07 NOTE — TELEPHONE ENCOUNTER
Patient was seen for this issue on 8/31/18 and triamcinolone cream  Per patient, she has used up the cream but the rectal itchiness persists  She does not feel that the cream was helpful  Per 8/31/18 OV notes, Dr. Roberson recommended patient come back for a rectal exam or see a proctology specialist if symptoms did not improve  However, patient stated that Dr. Roberson was just going to prescribe something else if the triamcinolone was not effective  Please advise    Harshal Hayward RN

## 2019-01-08 RX ORDER — LIRAGLUTIDE 6 MG/ML
INJECTION SUBCUTANEOUS
Qty: 3 ML | Refills: 2 | Status: SHIPPED | OUTPATIENT
Start: 2019-01-08 | End: 2019-03-18

## 2019-01-08 RX ORDER — TRAMADOL HYDROCHLORIDE 50 MG/1
50 TABLET ORAL 2 TIMES DAILY
Qty: 60 TABLET | Refills: 2 | Status: SHIPPED | OUTPATIENT
Start: 2019-01-08 | End: 2019-07-15

## 2019-01-08 NOTE — TELEPHONE ENCOUNTER
Patient notified.    Prescription sent    Advised her to call back if symptoms are not better after 2 weeks with the Lotrisone cream  Patient verbalized understanding.    Harshal Hayward RN

## 2019-01-08 NOTE — TELEPHONE ENCOUNTER
Confirmed prescription faxed to Pharmacy. Elise Guevara,       traMADol (ULTRAM) 50 MG tablet 60 tablet 2 1/8/2019  No   Sig - Route: Take 1 tablet (50 mg) by mouth 2 times daily 28 days or more between refills for controlled medications - Oral   Class: Local Print   Notes to Pharmacy: Not to exceed 5 additional fills before 03/30/2019.   Order: [id number]

## 2019-01-08 NOTE — TELEPHONE ENCOUNTER
Patient is checking status of previous refill request and states she is out of the Victoza. Please call.

## 2019-01-08 NOTE — TELEPHONE ENCOUNTER
"Routing refill request to provider for review/approval because:  Drug not on the [facility] refill protocol - Tramadol  Labs not current:  LDL, A1c  Labs out of range: Cr    Requested Prescriptions   Pending Prescriptions Disp Refills     liraglutide (VICTOZA PEN) 18 MG/3ML solution  2     Sig: INJECT 0.6 MG DAILY WEEK 1, THEN 1.2 MG WEEK 2, THEN 1.8 MG DAILY MOVING FORWARD    GLP-1 Agonists Protocol Failed - 1/8/2019 11:24 AM       Failed - LDL on file in past 12 months    Recent Labs   Lab Test 10/17/17  1151   LDL 29            Failed - HgbA1C in past 3 or 6 months    If HgbA1C is 8 or greater, it needs to be on file within the past 3 months.  If less than 8, must be on file within the past 6 months.     Recent Labs   Lab Test 08/31/18  1328   A1C 10.0*            Failed - Normal serum creatinine on file in past 12 months    Recent Labs   Lab Test 08/31/18  1328   CR 1.32*            Passed - Blood pressure less than 140/90 in past 6 months    BP Readings from Last 3 Encounters:   09/18/18 132/86   08/31/18 120/74   03/12/18 132/80                Passed - Microalbumin on file in past 12 months    Recent Labs   Lab Test 10/17/17  1150   MICROL <5   UMALCR Unable to calculate due to low value            Passed - Medication is active on med list       Passed - Patient is age 18 or older       Passed - No active pregnancy on record       Passed - No positive pregnancy test in past 12 months       Passed - Recent (6 mo) or future (30 days) visit within the authorizing provider's specialty    Patient had office visit in the last 6 months or has a visit in the next 30 days with authorizing provider.  See \"Patient Info\" tab in inbasket, or \"Choose Columns\" in Meds & Orders section of the refill encounter.            traMADol (ULTRAM) 50 MG tablet [Pharmacy Med Name: TRAMADOL HCL 50 MG TABLET] 60 tablet 0     Sig: TAKE ONE TABLET BY MOUTH TWICE DAILY AS NEEDED FOR MODERATE PAIN    There is no refill protocol information for this " "order      Signed Prescriptions Disp Refills     blood glucose (ONETOUCH VERIO IQ) test strip 100 strip 3     Sig: TEST THREE TIMES DAILY OR AS DIRECTED    Diabetic Supplies Protocol Passed - 1/8/2019 11:24 AM       Passed - Medication is active on med list       Passed - Patient is 18 years of age or older       Passed - Recent (6 mo) or future (30 days) visit within the authorizing provider's specialty    Patient had office visit in the last 6 months or has a visit in the next 30 days with authorizing provider.  See \"Patient Info\" tab in inbasket, or \"Choose Columns\" in Meds & Orders section of the refill encounter.            Magda Tay RN  "

## 2019-01-16 RX ORDER — INSULIN GLARGINE 300 U/ML
INJECTION, SOLUTION SUBCUTANEOUS
Qty: 27 ML | Refills: 0 | Status: SHIPPED | OUTPATIENT
Start: 2019-01-16 | End: 2019-04-23

## 2019-01-16 NOTE — TELEPHONE ENCOUNTER
"Routing refill request to provider for review/approval because:  Labs not current:  LDL, microalbumin, A1C within past 3 months        Requested Prescriptions   Pending Prescriptions Disp Refills     TOUJEO SOLOSTAR 300 UNIT/ML injection [Pharmacy Med Name: TOUJEO SOLOSTAR 300 UNIT/ML]  0     Sig: INJECT 100 UNITS SUBCUTANEOUS AT BEDTIME    Long Acting Insulin Protocol Failed - 1/16/2019  8:01 AM       Failed - LDL on file in past 12 months    Recent Labs   Lab Test 10/17/17  1151   LDL 29            Failed - Microalbumin on file in past 12 months    Recent Labs   Lab Test 10/17/17  1150   MICROL <5   UMALCR Unable to calculate due to low value            Failed - HgbA1C in past 3 or 6 months    If HgbA1C is 8 or greater, it needs to be on file within the past 3 months.  If less than 8, must be on file within the past 6 months.     Recent Labs   Lab Test 08/31/18  1328   A1C 10.0*            Passed - Blood pressure less than 140/90 in past 6 months    BP Readings from Last 3 Encounters:   09/18/18 132/86   08/31/18 120/74   03/12/18 132/80                Passed - Serum creatinine on file in past 12 months    Recent Labs   Lab Test 08/31/18  1328   CR 1.32*            Passed - Medication is active on med list       Passed - Patient is age 18 or older       Passed - Recent (6 mo) or future (30 days) visit within the authorizing provider's specialty    Patient had office visit in the last 6 months or has a visit in the next 30 days with authorizing provider or within the authorizing provider's specialty.  See \"Patient Info\" tab in inbasket, or \"Choose Columns\" in Meds & Orders section of the refill encounter.              "

## 2019-01-16 NOTE — TELEPHONE ENCOUNTER
An appointment is absolutely necessary by march 2019. Approve all refills good for 90 days without additional refills     Mateus Roberson MD

## 2019-01-16 NOTE — TELEPHONE ENCOUNTER
Patient called and informed.  Appointment scheduled for Friday, February 1st at 1:30 p.m., with Dr. Roberson. Camila Rudolph,

## 2019-01-18 ENCOUNTER — TELEPHONE (OUTPATIENT)
Dept: FAMILY MEDICINE | Facility: CLINIC | Age: 69
End: 2019-01-18

## 2019-01-18 NOTE — TELEPHONE ENCOUNTER
Copied from previous message from today:    Harshal Hayward RN         1/18/19 1:03 PM   Note      Per Savannah, patient felt shaky and hot around 10 PM last night  She had just eaten a peanut butter and jelly sandwich before bed and taken her Toujeo 100 units about 5-10 min prior to feeling shaky  (Explained to Savannah that Toujeo is a long acting insulin)  Daughter then checked BG and it was 48  Patient took some orange juice and ate an orange     Daughter continued to check BG throughout the night (results below).  BG fluctuated and there are some numbers that seem off.  Savannah believes that hands were washed/wiped prior to checking as the daughter was performing the task     10:18 PM- 92  10:26 PM- 77  10:32 PM- 83  10:42 PM- 222  11:01 PM- 150  11:21 PM- 149  11:46 PM- 151  12:40 AM-405  2:18 AM- 159  3:07 AM- 311  6:55 AM- 165  10:30 AM- 161     Per Savannah, patient is taking Victoza 1.8mg daily, Humalog 15 units TID, and Toujeo 100 units at HS.  However, patient is non compliant with checking BG.  Daughter reported that patient refuses to get up to check BG at times even when the glucometer in sight.  Daughter wants patient to continue to move around and get up so she is trying to not do everything for patient.  Daughter and HC to continue to encourage patient to check BG regularly  From what Savannah can see on patient's glucometer memory, patient has only checked her BG occasionally (i.e once every few weeks) and those numbers were high  HC has a visit scheduled for next Thursday 1/24/19 and will f/u with patient then to see what her BG results are     Harshal Hayward RN

## 2019-01-18 NOTE — TELEPHONE ENCOUNTER
Please see message below and advise.    Stacey Alexander RN  [facility] [place]

## 2019-01-18 NOTE — TELEPHONE ENCOUNTER
Reason for call:  Injection for 5 pm today  Patient called regarding (reason for call): injection for diabetes  Additional comments: Patient is not sure if she should continue her shots for 5 pm and 9:30 pm tonight due to drop in her blood sugars yesterday and was down to 48 then. Please call to advise.    Phone number to reach patient:  Home number on file [phone] (home)    Best Time:  asap    Can we leave a detailed message on this number?  NO

## 2019-01-18 NOTE — TELEPHONE ENCOUNTER
Per Savannah, patient felt shaky and hot around 10 PM last night  She had just eaten a peanut butter and jelly sandwich before bed and taken her Toujeo 100 units about 5-10 min prior to feeling shaky  (Explained to Savannah that Toujeo is a long acting insulin)  Daughter then checked BG and it was 48  Patient took some orange juice and ate an orange    Daughter continued to check BG throughout the night (results below).  BG fluctuated and there are some numbers that seem off.  Savannah believes that hands were washed/wiped prior to checking as the daughter was performing the task    10:18 PM- 92  10:26 PM- 77  10:32 PM- 83  10:42 PM- 222  11:01 PM- 150  11:21 PM- 149  11:46 PM- 151  12:40 AM-405  2:18 AM- 159  3:07 AM- 311  6:55 AM- 165  10:30 AM- 161    Per Savannah, patient is taking Victoza 1.8mg daily, Humalog 15 units TID, and Toujeo 100 units at HS.  However, patient is non compliant with checking BG.  Daughter reported that patient refuses to get up to check BG at times even when the glucometer in sight.  Daughter wants patient to continue to move around and get up so she is trying to not do everything for patient.  Daughter and HC to continue to encourage patient to check BG regularly  From what Savannah can see on patient's glucometer memory, patient has only checked her BG occasionally (i.e once every few weeks) and those numbers were high  HC has a visit scheduled for next Thursday 1/24/19 and will f/u with patient then to see what her BG results are    Harshal Hayward RN

## 2019-01-18 NOTE — TELEPHONE ENCOUNTER
Reason for call:  Patient reporting a symptom    Symptom or request: Very low blood sugar read 48, shaky and hot.     Duration (how long have symptoms been present): Last night     Have you been treated for this before? No    Additional comments: Buffy is a home care nurse calling this information in.     Phone Number patient can be reached at:  Other phone number:[phone]      Best Time:  Any     Can we leave a detailed message on this number:  YES    Call taken on 1/18/2019 at 12:51 PM by Dereje Baxter

## 2019-01-20 NOTE — TELEPHONE ENCOUNTER
The Practice of Medicine encompasses extremely difficult issues . This patient has at the very least 2 major ones    1. Schizophrenia with difficulty following directions of care and frequent noncompliance with blood glucose readings   2. The blood glucose readings we received here are not right as it's not going to make any sense to have such wild fluctuations between low and high within such a short time period.    It's also equally challenging for ones loved ones to know what to do in many cases. Optimum care would include    A. A visit to certified diabetes educator with getting set up again with a continuous glucose monitor  , possibly FreeStyle Karen Pro Flash Glucose Monitoring System  B. Adjustments with plan of care to optimize our progress towards goal of good blood glucose control  C. Ongoing close care with reports on home blood glucose readings every 2 weeks or so until we feel more confidant of what's going on .  D. Short of this, the alternative choice is to just accept what's going on with a general goal of avoiding hypoglycemia at all costs because that is more dangerous then high blood glucose readings. Perhaps we should reset our goals to any blood glucose readings between lets say 100-230 ?    Mateus Roberson MD

## 2019-01-21 NOTE — TELEPHONE ENCOUNTER
Left message on voicemail for patient to return call to RN hotline at # [phone].    Please encourage compliance with BG check  Diabetic Ed referral pended      Harshal Hayward RN

## 2019-01-23 NOTE — TELEPHONE ENCOUNTER
Spoke with patient  She has been checking BG and it has been running around 198, 185, 143  Has been taking insulins as prescribed  She has not had any further episodes of hypoglycemia or feeling shaky   She has an appointment with PCP on 2/1/19 and agreed to have referral to DM ed placed but also wants to discuss further at OV    Referral placed    Harshal Hayward RN

## 2019-01-25 ENCOUNTER — MEDICAL CORRESPONDENCE (OUTPATIENT)
Dept: HEALTH INFORMATION MANAGEMENT | Facility: CLINIC | Age: 69
End: 2019-01-25

## 2019-01-29 ENCOUNTER — TELEPHONE (OUTPATIENT)
Dept: FAMILY MEDICINE | Facility: CLINIC | Age: 69
End: 2019-01-29

## 2019-01-29 NOTE — TELEPHONE ENCOUNTER
Reason for call:  Other   Patient called regarding (reason for call): call back  Additional comments: Patient is calling because she thinks she has a possible uti. Please call back    Phone number to reach patient:  Home number on file [phone] (home)    Best Time:  any    Can we leave a detailed message on this number?  YES

## 2019-01-29 NOTE — TELEPHONE ENCOUNTER
Pt was given provider's message as written. States she does not have a computer.   She cannot come in sooner than Friday. Advised that if symptoms worsen or if fever reoccurs, she should go to the ER and call 911 so she is transported there.    Stacey Alexander RN  [facility]

## 2019-01-29 NOTE — TELEPHONE ENCOUNTER
This portrayal of the patients condition causes me some discomfort for potential for sepsis, etc.     1. Allow patient to have a urine analysis and urine culture and an E-visit protocol     OR    2. Scheduled face to face encounter     Mateus Roberson MD

## 2019-01-29 NOTE — TELEPHONE ENCOUNTER
Spoke with pt. Symptoms started 4 days ago. Urine is cloudy and may have traces of blood in it per her daughter who is a nurse. Aches all over and is running a fever. Could not find thermometer. Knows it is high enough she had to put cold cloths on her. Fever broke over night and feels better today. Has frequency and urgency. No pain with urination. Does have back, shoulder and pain down her arms. No abdominal pain. No nausea or vomiting. Can't make it into the clinic today or tomorrow. Friday has help and also has an appt scheduled with Dr. Roberson but said she cannot wait until Friday for this. Asked if provider ordered a UA/UC if she could even come in to do this, or have someone pick it up and she said she can't. She asked that message be sent to provider to advise.    Stacey Alexander RN  [facility]

## 2019-01-31 ENCOUNTER — PATIENT OUTREACH (OUTPATIENT)
Dept: CARE COORDINATION | Facility: CLINIC | Age: 69
End: 2019-01-31

## 2019-02-01 ASSESSMENT — ACTIVITIES OF DAILY LIVING (ADL): DEPENDENT_IADLS:: INCONTINENCE

## 2019-02-01 NOTE — PROGRESS NOTES
"Clinic Care Coordination Contact    Clinic Care Coordination Contact  OUTREACH    Referral Information:  Referral Source: [facility]    Primary Diagnosis: Genitourinary Disorders    Chief Complaint   Patient presents with     Clinic Care Coordination - Post Hospital     Discharged home on 1/30 from [facility] Utilization: Patient was seen at [facility] ED on 1/30/19     Utilization    Last refreshed: 1/30/2019  9:26 AM:  Hospital Admissions 0           Last refreshed: 1/30/2019  9:26 AM:  ED Visits 0           Last refreshed: 1/30/2019  9:26 AM:  No Show Count (past year) 0              Current as of: 1/30/2019  9:26 AM          Clinical Concerns:    Current Medical Concerns:  Patient was diagnosed with UTI and hematuria.     Patient states that her UTI symptoms are getting better. She verifies that she is taking the antibiotic. She verifies that she is \"pushing fluids.\"    Patient states that her neck and shoulder are very painful and stiff. She states she was told to alternate ice and heat. She states she can't find the heating pad, but is using warm towels. Icy hot was ordered in Ed but she states her pharmacy did not have any so she has not started using that.    She states that her daughter is staying with her currently.     Current Behavioral Concerns: Pain      Education Provided to patient:  icy hot. Try over the counter Lidocaine patches or Salonpas.       Health Maintenance Reviewed:      Clinical Pathway: None    Medication Management:  Patient has medication assistance with set up from home care nurse, Buffy.     Patient states her blood sugars have been good.     Functional Status:  Dependent ADLs:: Incontinence, Ambulation-walker  Dependent IADLs:: Incontinence  Bed or wheelchair confined:: No  Mobility Status: Independent w/Device  Fallen 2 or more times in the past year?: No  Any fall with injury in the past year?: No    Living Situation:  Current living arrangement:: I " live alone  Type of residence:: Apartment - handicap accessible    Diet/Exercise/Sleep:  Diet:: Diabetic diet    Transportation:  Transportation concerns (GOAL):: No  Transportation means:: Accessible car     Psychosocial:  Mental health DX:: No  Mental health management concern (GOAL):: No  Informal Support system:: Children     Financial/Insurance:   Financial/Insurance concerns (GOAL):: No     Resources and Interventions:  Current Resources:   List of home care services:: Skilled Nursing;  [facility]  Community Resources: Home Care  Supplies used at home:: Incontinence Supplies, Diabetic Supplies, Other(Cpap)  Equipment Currently Used at Home: cane, straight, walker, rolling, grab bar, toilet, grab bar, tub/shower, glucometer    Advance Care Plan/Directive  Type Advanced Care Plans/Directives: POLST    Referrals Placed: None    Patient/Caregiver understanding: Poor understanding    Plan: Patient will make follow up call with PCP.    Patient is followed by [facility] care coordination. Will not open to clinic car coordination.    June Song RN, CCM - Care Coordinator     2/1/2019    9:43 AM  [phone]

## 2019-02-03 ENCOUNTER — NURSE TRIAGE (OUTPATIENT)
Dept: NURSING | Facility: CLINIC | Age: 69
End: 2019-02-03

## 2019-02-03 NOTE — TELEPHONE ENCOUNTER
"Pt calling as her neck is still hurting (severe) with right sided chest and are tingling. All of these symptoms were present when she was seen in the ED and [facility] on 1/30/19. She was dx with a UTI at that time. The pain medication that she was given, tramadol, is not helpful for the neck pain and she wants more medication over the phone. Informed I am not [facility] and we are not able to do pain management over the phone. She needs to be seen in an ED for evaluation and treatment as needed. She says she can not get out of the house. I advised 911 now for transportation. She said ok, and hung up.     Oumou Urban RN, [facility]    Reason for Disposition    [1] Stiff neck (can't put chin to chest) AND [2] headache    Additional Information    Negative: Shock suspected (e.g., cold/pale/clammy skin, too weak to stand, low BP, rapid pulse)    Negative: Difficult to awaken or acting confused  (e.g., disoriented, slurred speech)    Negative: [1] Similar pain previously AND [2] it was from \"heart attack\"    Negative: [1] Similar pain previously AND [2] it was from \"angina\" AND [3] not relieved by nitroglycerin    Negative: Sounds like a life-threatening emergency to the triager    Negative: Followed a neck injury (e.g., MVA, sports, impact or collision)    Negative: Chest pain    Negative: Lymph node in the neck is swollen or painful to the touch    Negative: Sore throat is main symptom    Negative: Difficulty breathing or unusual sweating (e.g., sweating without exertion)    Protocols used: NECK PAIN OR STIFFNESS-ADULT-AH      "

## 2019-02-04 ENCOUNTER — OFFICE VISIT (OUTPATIENT)
Dept: FAMILY MEDICINE | Facility: CLINIC | Age: 69
End: 2019-02-04
Payer: COMMERCIAL

## 2019-02-04 VITALS
BODY MASS INDEX: 50.02 KG/M2 | OXYGEN SATURATION: 94 % | WEIGHT: 293 LBS | HEART RATE: 104 BPM | TEMPERATURE: 98.5 F | RESPIRATION RATE: 22 BRPM | HEIGHT: 64 IN

## 2019-02-04 DIAGNOSIS — M54.2 NECK PAIN: ICD-10-CM

## 2019-02-04 DIAGNOSIS — G47.33 OBSTRUCTIVE SLEEP APNEA: ICD-10-CM

## 2019-02-04 DIAGNOSIS — Z00.00 WELLNESS EXAMINATION: Primary | ICD-10-CM

## 2019-02-04 DIAGNOSIS — C54.1 ENDOMETRIAL CANCER, GRADE I (H): ICD-10-CM

## 2019-02-04 DIAGNOSIS — E78.5 HYPERLIPIDEMIA WITH TARGET LDL LESS THAN 100: ICD-10-CM

## 2019-02-04 DIAGNOSIS — E66.01 MORBID OBESITY DUE TO EXCESS CALORIES (H): ICD-10-CM

## 2019-02-04 DIAGNOSIS — Z13.29 SCREENING FOR HYPOTHYROIDISM: ICD-10-CM

## 2019-02-04 DIAGNOSIS — F20.9 SCHIZOPHRENIA, UNSPECIFIED TYPE (H): ICD-10-CM

## 2019-02-04 DIAGNOSIS — S49.91XD INJURY OF RIGHT SHOULDER, SUBSEQUENT ENCOUNTER: ICD-10-CM

## 2019-02-04 LAB
ALBUMIN SERPL-MCNC: 2.1 G/DL (ref 3.4–5)
ANION GAP SERPL CALCULATED.3IONS-SCNC: 8 MMOL/L (ref 3–14)
BUN SERPL-MCNC: 24 MG/DL (ref 7–30)
CALCIUM SERPL-MCNC: 9.1 MG/DL (ref 8.5–10.1)
CHLORIDE SERPL-SCNC: 100 MMOL/L (ref 94–109)
CHOLEST SERPL-MCNC: 84 MG/DL
CO2 SERPL-SCNC: 27 MMOL/L (ref 20–32)
CREAT SERPL-MCNC: 1.58 MG/DL (ref 0.52–1.04)
GFR SERPL CREATININE-BSD FRML MDRD: 34 ML/MIN/{1.73_M2}
GLUCOSE SERPL-MCNC: 229 MG/DL (ref 70–99)
HBA1C MFR BLD: 6.8 % (ref 0–5.6)
HDLC SERPL-MCNC: 44 MG/DL
LDLC SERPL CALC-MCNC: 26 MG/DL
NONHDLC SERPL-MCNC: 40 MG/DL
POTASSIUM SERPL-SCNC: 3.9 MMOL/L (ref 3.4–5.3)
SODIUM SERPL-SCNC: 135 MMOL/L (ref 133–144)
TRIGL SERPL-MCNC: 71 MG/DL
TSH SERPL DL<=0.005 MIU/L-ACNC: 2.17 MU/L (ref 0.4–4)

## 2019-02-04 PROCEDURE — 80061 LIPID PANEL: CPT | Performed by: INTERNAL MEDICINE

## 2019-02-04 PROCEDURE — 99214 OFFICE O/P EST MOD 30 MIN: CPT | Performed by: INTERNAL MEDICINE

## 2019-02-04 PROCEDURE — 36415 COLL VENOUS BLD VENIPUNCTURE: CPT | Performed by: INTERNAL MEDICINE

## 2019-02-04 PROCEDURE — 83036 HEMOGLOBIN GLYCOSYLATED A1C: CPT | Performed by: INTERNAL MEDICINE

## 2019-02-04 PROCEDURE — 82040 ASSAY OF SERUM ALBUMIN: CPT | Performed by: INTERNAL MEDICINE

## 2019-02-04 PROCEDURE — 84443 ASSAY THYROID STIM HORMONE: CPT | Performed by: INTERNAL MEDICINE

## 2019-02-04 PROCEDURE — 80048 BASIC METABOLIC PNL TOTAL CA: CPT | Performed by: INTERNAL MEDICINE

## 2019-02-04 RX ORDER — HYDROCODONE BITARTRATE AND ACETAMINOPHEN 5; 325 MG/1; MG/1
1 TABLET ORAL EVERY 6 HOURS PRN
Qty: 18 TABLET | Refills: 0 | Status: SHIPPED | OUTPATIENT
Start: 2019-02-04 | End: 2019-07-15

## 2019-02-04 RX ORDER — INSULIN LISPRO 100 [IU]/ML
INJECTION, SOLUTION INTRAVENOUS; SUBCUTANEOUS
Qty: 3 ML | Refills: 3 | Status: SHIPPED | OUTPATIENT
Start: 2019-02-04 | End: 2019-05-07 | Stop reason: DRUGHIGH

## 2019-02-04 ASSESSMENT — MIFFLIN-ST. JEOR: SCORE: 1978.21

## 2019-02-04 ASSESSMENT — PAIN SCALES - GENERAL: PAINLEVEL: WORST PAIN (10)

## 2019-02-04 NOTE — PROGRESS NOTES
"  SUBJECTIVE:   Marina Valdez is a 68 year old female who presents to clinic today for the following health issues:      ED/UC Followup:    Facility:  [facility]  Date of visit: 1/31/19  Reason for visit: UTI and pain in neck and right arm/fingers  Current Status: Pt reports it's not getting any better     This is a patient with schizophrenia and has a completely unwieldy and unrealistic set of expectations from today's office visit.    Last appointment with me was 9/18/2018    Will pay attention to acute concerns however MANY issues due with healthcare maintenance including     Wellness physical exam   Eye exams   Albumin  Albumin random urine quantitative   Mammogram - again declines saying \" I will do it later\".  Fasting lipid panel   TSH ( thyroid test )   Hemoglobin a1c  [ diabetes test ]   Basic metabolic panel   Gfr [ glomerular filtration rate ] is 40 in 8/2018    Here with sister Evon who I had not met before    She uses CPAP [ continuous positive airway pressure] - for a diagnosis of obstructive sleep apnea - diagnosed 10 years ago. The place where she had the study is closed. I don't have a copy of her study however she assures me the medical supply house DOES she says.    Wants this prescription for durable medical equipment or supplies sent to [facility].  A face to face encounter needed - see address of [facility].    Neck and arm symptoms , her pain goes down from her neck and towards under her arm and fingertips  , she was awake every 2 hours last night. Using Icy Hot Pain Relieving Cream with minimal benefit , has been ongoing for 6 days . She already uses tramadol (Ultram) without benefit. No history of an injury and origin of this problem is uncertain.     Emergency room evaluation for this but I reviewed her emergency room evaluation and they chose to focus on her urinary tract infection symptoms and no workup or discussion of her neck symptoms took place.    Also so she " recently diagnosed with a urinary tract infection at the emergency room evaluation, see care everywhere . The emergency room evaluation included a diagnosis of a urinary tract infection and absolutely no diagnosis or evaluation happened whatsoever with the neck. Took antibiotics for urinary tract infection and those symptoms have resolved     Next she needs her diabetes mellitus reviewed and evaluated      Problem list and histories reviewed & adjusted, as indicated.  Additional history: as documented    Patient Active Problem List   Diagnosis     Hypertension goal BP (blood pressure) < 140/90     Recurrent genital HSV (herpes simplex virus) infection     Intertrigo     Ovarian Mass s/p excision in 2008     Sebaceous cyst     Microalbuminuria     Hyperlipidemia with target LDL less than 100     Chest pain at rest x1 episode- resolved at rest 3 hours, assoc with food     Obstructive sleep apnea (on cpap)     24 hour contact given to patient      Hydradenitis     CKD (chronic kidney disease) stage 3, GFR 30-59 ml/min (H)     Compulsive skin picking     Need for prophylactic vaccination and inoculation against influenza     High risk OTC medication or supplement use     Rotator cuff tear     Morbid obesity due to excess calories (H)     Chronic bilateral low back pain without sciatica     Primary osteoarthritis of both hips     Schizophrenia, unspecified type (H)     Uncontrolled type 2 diabetes mellitus with stage 3 chronic kidney disease, with long-term current use of insulin (H)     Endometrial cancer, grade I (H)     Impetigo     Health Care Home     Pruritus ani     Past Surgical History:   Procedure Laterality Date     cataract      bilateral     CYSTOSCOPY N/A 12/29/2016    Procedure: CYSTOSCOPY;  Surgeon: Franca Rousseau MD;  Location: [facility] OR     DAVINCI HYSTERECTOMY TOTAL, BILATERAL SALPINGO-OOPHORECTOMY, COMBINED N/A 12/29/2016    Procedure: COMBINED DAVINCI HYSTERECTOMY TOTAL, SALPINGO-OOPHORECTOMY;   Surgeon: Franca Rousseau MD;  Location: [facility] OR     LAPAROSCOPY      for endometriosis     OOPHORECTOMY      right     WRIST SURGERY      right       Social History     Tobacco Use     Smoking status: Former Smoker     Packs/day: 1.00     Years: 0.00     Pack years: 0.00     Types: Cigarettes     Last attempt to quit: 1990     Years since quittin.1     Smokeless tobacco: Never Used     Tobacco comment: quit    Substance Use Topics     Alcohol use: No     Family History   Problem Relation Age of Onset     Heart Disease Mother      Diabetes Mother      Hypertension Mother      Psychotic Disorder Mother         schitzophrenia     Hypertension Father      Blood Disease Father         high iron level     Heart Disease Maternal Grandmother      Cerebrovascular Disease Maternal Grandmother      Heart Disease Maternal Grandfather      Breast Cancer Paternal Grandmother      Breast Cancer Sister      Heart Disease Brother      Ovarian Cancer Paternal Aunt      Kidney Cancer Daughter      Uterine Cancer Daughter      Uterine Cancer Sister          Current Outpatient Medications   Medication Sig Dispense Refill     acetaminophen (TYLENOL) 500 MG tablet Take 500 mg by mouth       aspirin 81 MG tablet Take 1 tablet (81 mg) by mouth daily 100 tablet 3     B-D U/F 31G X 8 MM insulin pen needle USE FOUR TIMES DAILY AS DIRECTED WITH LANTUS AND PREMEAL INSULIN 500 each 3     blood glucose (ONETOUCH VERIO IQ) test strip TEST THREE TIMES DAILY OR AS DIRECTED 100 strip 3     blood glucose monitoring (NO BRAND SPECIFIED) meter device kit Use to test blood sugar 3 times daily or as directed. 1 kit 0     blood glucose monitoring (NO BRAND SPECIFIED) test strip Use to test blood sugars 3 times daily or as directed 300 strip 3     blood glucose monitoring (ONE TOUCH DELICA) lancets Use to test blood sugars 3 times daily or as directed. Qty of 1 box= 100 lancets 300 each 3     cholecalciferol 1000 UNITS TABS Take 1  tablet by mouth daily       diphenhydrAMINE (BENADRYL) 25 MG tablet Take 25 mg by mouth At Bedtime And 25 mg nightly as needed       HYDROcodone-acetaminophen (NORCO) 5-325 MG tablet Take 1 tablet by mouth every 6 hours as needed for pain 18 tablet 0     hydrocortisone valerate (WEST-JOE) 0.2 % ointment Apply sparingly to affected area three times daily for 14 days. 15 g 0     insulin lispro (HUMALOG KWIKPEN) 100 UNIT/ML injection Inject 15 Units Subcutaneous 3 times daily (before meals) 3 mL 3     insulin pen needle (B-D U/F) 31G X 8 MM Use 5 pen needles daily or as directed. 500 each 0     liraglutide (VICTOZA PEN) 18 MG/3ML solution Inject 1.8 MG DAILY 3 mL 2     nystatin (MYCOSTATIN) 287203 UNIT/GM POWD APPLY TO GROIN AND UNDER BREAST TWICE DAILY UNTIL REDNESS HAS RESOLVED. 120 g 1     order for DME Equipment being ordered: CPAP along with all associated supplies including head gear with nose mask and hose 1 Device 0     order for DME Equipment being ordered: Women's incontinence pads/liners, 3 per day 3 Month 11     order for DME Equipment being ordered: CPAP 1 Device 0     order for DME Equipment being ordered: home blood pressure cuff for home blood pressure monitoring 1 Device 0     order for DME Equipment being ordered: CPAP machine set at 15 pressure, heated humidifier, supplies: mask and tubing ( supplies) 1 Device 1     order for DME Equipment being ordered: corset to prevent compulsive skin picking of abdomen 1 Device 0     order for DME Equipment being ordered: Lift Chair 1 each 0     ORDER FOR DME Equipment being ordered: CPAP and associated supplies and tubing 1 Device 0     PAROXETINE HCL 40 MG OR TABS Take 80 mg by mouth daily. 80mg=2 tablets.        pravastatin (PRAVACHOL) 40 MG tablet TAKE 1 TABLET (40 MG) BY MOUTH DAILY 90 tablet 0     senna-docusate (SENOKOT-S;PERICOLACE) 8.6-50 MG per tablet Take 2 tablets by mouth 2 times daily as needed for constipation       TOUJEO SOLOSTAR 300 UNIT/ML  injection INJECT 100 UNITS SUBCUTANEOUS AT BEDTIME 27 mL 0     traMADol (ULTRAM) 50 MG tablet Take 1 tablet (50 mg) by mouth 2 times daily 28 days or more between refills for controlled medications 60 tablet 2     valACYclovir (VALTREX) 500 MG tablet TAKE 1 TABLET (500 MG) BY MOUTH DAILY 90 tablet 1     valsartan-hydrochlorothiazide (DIOVAN HCT) 160-25 MG tablet Take 1 tablet by mouth daily 90 tablet 1     ziprasidone (GEODON) 80 MG capsule Take 160 mg by mouth every morning And 80 mg daily at 10 AM, and 80 mg nightly as needed       fluticasone (FLONASE) 50 MCG/ACT nasal spray Spray 1-2 sprays into both nostrils daily 1 Bottle 11     Allergies   Allergen Reactions     Bactroban [Mupirocin] Itching     Other [Seasonal Allergies]      Hayfever     Recent Labs   Lab Test 02/04/19  1255 08/31/18  1328 01/25/18  1501 10/17/17  1151 07/10/17  1340  03/15/17  1247 03/03/17  1552 11/11/16  1108 06/14/16  1338  07/09/15  1228  10/13/14  1034   A1C 6.8* 10.0*  --  10.4* 8.4*   < >  --   --  9.4* 10.2*   < > 8.1*   < > 8.1*   LDL  --   --   --  29 49  --   --   --   --  42  --  55  --  87   HDL  --   --   --  71  --   --   --   --   --  69  --  63  --   --    TRIG  --   --   --  109  --   --   --   --   --  90  --  103  --   --    ALT  --   --   --  24  --   --  27 45  --   --   --   --   --  33   CR  --  1.32* 1.20* 1.36*  --    < >  --  1.26* 1.31*  --    < > 1.23*   < > 1.19*   GFRESTIMATED  --  40* 45* 39*  --    < >  --  43* 41*  --    < > 44*   < > 46*   GFRESTBLACK  --  49* 54* 47*  --    < >  --  52* 49*  --    < > 53*   < > 56*   POTASSIUM  --  4.3 3.7 3.7  --    < >  --  3.5 3.7  --    < > 3.5   < > 3.7   TSH  --   --   --   --   --   --   --   --  2.65  --   --   --   --  3.18    < > = values in this interval not displayed.      BP Readings from Last 3 Encounters:   09/18/18 132/86   08/31/18 120/74   03/12/18 132/80    Wt Readings from Last 3 Encounters:   02/04/19 144.9 kg (319 lb 8 oz)   09/18/18 (!) 151 kg (333  "lb)   08/31/18 (!) 150.6 kg (332 lb)                  Labs reviewed in EPIC    Reviewed and updated as needed this visit by clinical staff       Reviewed and updated as needed this visit by Provider         ROS:  Constitutional, HEENT, cardiovascular, pulmonary, gi and gu systems are negative, except as otherwise noted.    OBJECTIVE:                                                    Pulse 104   Temp 98.5  F (36.9  C) (Oral)   Resp 22   Ht 1.632 m (5' 4.25\")   Wt 144.9 kg (319 lb 8 oz)   LMP 03/24/2010   SpO2 94%   Breastfeeding? No   BMI 54.42 kg/m    Body mass index is 54.42 kg/m .  GENERAL APPEARANCE: healthy, alert and no distress, answers all questions appropriately , talkative and appropriate   EYES: Eyes grossly normal to inspection, PERRL and conjunctivae and sclerae normal  HENT: ear canals and TM's normal and nose and mouth without ulcers or lesions, her lips are coated so heavily with chapstick that the residue has formed a pasty / cake like adherence substance and she keeps licking her lips. Her oropharynx exam is negative for thrush though she seems convinced that was what she had.  NECK: no adenopathy, no asymmetry, masses, or scars and thyroid normal to palpation  MS: extremities normal- no gross deformities noted,; her symptoms were suggestive of cervical radiculopathy but the more I examined her I became less sure that was a correct diagnosis as she had a full and normal range of motion with neck and has within normal limits strength of all tested motor groups. She was unable to fully elevate the arm above the head with both arms and she has super-morbid obesity with body mass index above 50 making details of musculoskeletal exam a challenging job.   SKIN: no suspicious lesions or rashes, no features of Herpes zoster    NEURO: Normal strength and tone, mentation intact and speech normal  PSYCH: mentation appears normal, inattentive, tangential and worried    Diagnostic test " results:  Diagnostic Test Results:  Orders Placed This Encounter   Procedures     Lipid panel reflex to direct LDL Non-fasting     Hemoglobin A1c     Basic metabolic panel     TSH with free T4 reflex     Albumin level     [facility]          ASSESSMENT/PLAN:                                                    1. Endometrial cancer, grade I (H)  Noted as a point of historical importance     2. Schizophrenia, unspecified type (H)  Continue current plan of care with psychiatrist    - Albumin level    3. Morbid obesity due to excess calories (H)  Ongoing evaluation and management , no easy answers  - Albumin level    4. Uncontrolled type 2 diabetes mellitus with stage 3 chronic kidney disease, with long-term current use of insulin (H)  We downloaded her blood glucose readings and actually things don't look so bad !  - Hemoglobin A1c  - Basic metabolic panel  - Albumin level  With further follow up     5. Wellness examination      6. Neck pain  Lets start with an [facility] evaluation. Depending on how things go we may eventually be forced to go down the road of cervical spine MRI study and other measures. A logical starting point is [facility]   - [facility]ERRAL; Future  - HYDROcodone-acetaminophen (NORCO) 5-325 MG tablet; Take 1 tablet by mouth every 6 hours as needed for pain  Dispense: 18 tablet; Refill: 0    7. Injury of right shoulder, subsequent encounter  As above   - [facility]ERR[facility]; Future  - HYDROcodone-acetaminophen (NORCO) 5-325 MG tablet; Take 1 tablet by mouth every 6 hours as needed for pain  Dispense: 18 tablet; Refill: 0    8. Obstructive sleep apnea (on cpap)  A completed prescription for durable medical equipment or supplies is provided  - order for DME; Equipment being ordered: CPAP along with all associated supplies including head gear with nose mask and hose  Dispense: 1  Device; Refill: 0    9. Hyperlipidemia with target LDL less than 100  Continue current plan of care     - Lipid panel reflex to direct LDL Non-fasting    10. Screening for hypothyroidism  Routine screening   - TSH with free T4 reflex      Follow up with Provider - 1-3 months      Mateus Roberson MD  [facility]

## 2019-02-04 NOTE — TELEPHONE ENCOUNTER
"Routing refill request to provider for review/approval because:  Labs not current:        Requested Prescriptions   Pending Prescriptions Disp Refills     HUMALOG KWIKPEN 100 UNIT/ML soln [Pharmacy Med Name: HUMALOG 100 UNITS/ML KWIKPEN]  Last Written Prescription Date:  8/31/18  Last Fill Quantity: 3 mL,  # refills: 3   Last office visit: 2/4/2019 with prescribing provider:     Future Office Visit:      3     Sig: INJECT 15 UNITS SUBCUTANEOUS 3 TIMES DAILY (BEFORE MEALS)    Short Acting Insulin Protocol Failed - 2/4/2019  5:06 PM       Failed - LDL on file in past 12 months    Recent Labs   Lab Test 10/17/17  1151   LDL 29            Failed - Microalbumin on file in past 12 months    Recent Labs   Lab Test 10/17/17  1150   MICROL <5   UMALCR Unable to calculate due to low value            Passed - Blood pressure less than 140/90 in past 6 months    BP Readings from Last 3 Encounters:   09/18/18 132/86   08/31/18 120/74   03/12/18 132/80                Passed - Serum creatinine on file in past 12 months    Recent Labs   Lab Test 08/31/18  1328   CR 1.32*            Passed - HgbA1C in past 3 or 6 months    If HgbA1C is 8 or greater, it needs to be on file within the past 3 months.  If less than 8, must be on file within the past 6 months.     Recent Labs   Lab Test 02/04/19  1255   A1C 6.8*            Passed - Medication is active on med list       Passed - Patient is age 18 or older       Passed - Recent (6 mo) or future (30 days) visit within the authorizing provider's specialty    Patient had office visit in the last 6 months or has a visit in the next 30 days with authorizing provider or within the authorizing provider's specialty.  See \"Patient Info\" tab in inbasket, or \"Choose Columns\" in Meds & Orders section of the refill encounter.            Nadia Calderon, RN - [facility]      "

## 2019-02-04 NOTE — LETTER
[facility]  [address] [place] [address]    February 5, 2019    Marina Valdez  [address]    [place] [id number]          Dear Marina,    Your hemoglobin a1c  [ diabetes test ] is really superb. You have brought this number all the way  Down to excellent . This does also square away with the blood glucose readings we reviewed from your glucometer today.     Lets be cautious, hopefull no lows ! A recheck in 3 months is recommended     Enclosed is a copy of your results.     Results for orders placed or performed in visit on 02/04/19   Lipid panel reflex to direct LDL Non-fasting   Result Value Ref Range    Cholesterol 84 <200 mg/dL    Triglycerides 71 <150 mg/dL    HDL Cholesterol 44 (L) >49 mg/dL    LDL Cholesterol Calculated 26 <100 mg/dL    Non HDL Cholesterol 40 <130 mg/dL   Hemoglobin A1c   Result Value Ref Range    Hemoglobin A1C 6.8 (H) 0 - 5.6 %   Basic metabolic panel   Result Value Ref Range    Sodium 135 133 - 144 mmol/L    Potassium 3.9 3.4 - 5.3 mmol/L    Chloride 100 94 - 109 mmol/L    Carbon Dioxide 27 20 - 32 mmol/L    Anion Gap 8 3 - 14 mmol/L    Glucose 229 (H) 70 - 99 mg/dL    Urea Nitrogen 24 7 - 30 mg/dL    Creatinine 1.58 (H) 0.52 - 1.04 mg/dL    GFR Estimate 34 (L) >60 mL/min/[1.73_m2]    GFR Estimate If Black 39 (L) >60 mL/min/[1.73_m2]    Calcium 9.1 8.5 - 10.1 mg/dL   TSH with free T4 reflex   Result Value Ref Range    TSH 2.17 0.40 - 4.00 mU/L   Albumin level   Result Value Ref Range    Albumin 2.1 (L) 3.4 - 5.0 g/dL       If you have any questions or concerns, please call myself or my nurse at [phone].      Sincerely,        Mateus Roberson MD/case

## 2019-02-06 ENCOUNTER — TELEPHONE (OUTPATIENT)
Dept: FAMILY MEDICINE | Facility: CLINIC | Age: 69
End: 2019-02-06

## 2019-02-06 DIAGNOSIS — M54.12 CERVICAL RADICULOPATHY: Primary | ICD-10-CM

## 2019-02-06 DIAGNOSIS — F40.240 CLAUSTROPHOBIA: ICD-10-CM

## 2019-02-06 NOTE — TELEPHONE ENCOUNTER
Reason for call:  Other   Patient called regarding (reason for call): call back  Additional comments: Patient is calling because she is still having neck pain, please call back    Phone number to reach patient:  Home number on file [phone] (home)    Best Time:  any    Can we leave a detailed message on this number?  YES

## 2019-02-06 NOTE — TELEPHONE ENCOUNTER
"Spoke with patient who reports continued neck pain for about the past 8 days.   Patient was in on 2/4/19 to see Dr. Roberson.  States neck pain hasn't improved or gotten worse and but it's severe, 10/10 on pain scale and \"feels like something is in there\". Reporting headaches and SOB but states she always has that and it's nothing new.   States the pain goes down her R arm and into fingers and fingertips.  Also states she feels like her tonsils are swollen and hurts to swallow.   Patient able to move head side-to-side and up-and-down.  Has been using Tylenol, Icyhot and Norco but states that they're \"not even touching the pain\"  Patient denies injury and states she just woke up with neck pain one day. Also denies CP, dizziness/lightheadedness.     Please advise.       Magda Tay RN  "

## 2019-02-06 NOTE — TELEPHONE ENCOUNTER
Set patient for cervical spine MRI study . Further discussion after results     Diagnosis is cervical radiculopathy     Mateus Roberson MD

## 2019-02-07 NOTE — TELEPHONE ENCOUNTER
"Called patient.  She stated that she has a \"steel plate in my left arm\" and has claustrophobia    Please advise    Harshal Hayward RN    "

## 2019-02-07 NOTE — TELEPHONE ENCOUNTER
Noted in Care Everywhere that patient had an MRI of the hip done in January 2017    Called imaging scheduling and was advised that ortho metal is normally titanium and would be MRI safe    Called patient and she reported that her wrist surgery was a few years ago.   She is unsure exact date or year and whether it was done prior or after the hip MRI in 2017  She is not sure what type of metal was used for her wrist  Her ortho surgeon was Dr. Sinan Montesinos with [facility] at [phone]    Called [facility] and was transferred to Dr. Jaguar Dudley's care coordinator   on her VM asking if patient is MRI safe  RN hotline number [phone] given    FYI- patient would like to get the regular MRI completed and get prescription for Valium to take, if we can verify that she is MRI safe- order pended. Will also need provider to write Valium prescription     Harshal Hayward RN

## 2019-02-07 NOTE — TELEPHONE ENCOUNTER
"Then we need to go with a neck CT scan if she's not MRI safe. An open sided mri is probably not going to work due to \" the metal plate\". Perhaps we need to explain to Marina that without the testing we can't diagnose  For sure that this is a herniated disc with cervical radiculopathy. Clinically that is what she has. Lets present her options.    If we sent her straight to neurosurgery without the MRI they would most likely not do anything     If she does have the open sided mri versus neck CT scan we can diagnose  Her more accurately. After this, we can find out if her condition is amenable to a steroid injection or surgery     If she's just not having any of this, we really can't just give her pain pills, that's not a viable option     If she declines all this we can try physical therapy ?    If she has severe claustraphobia and is willing we can prescribe 2 diazepam (VALIUM) tabs 5 milligrams for the tube / radiographic imaging    She's already got muscle relaxers . Nonsurgical treatment options for herniated disc include traction and other efforts for which she can see physical therapy.    Mateus Roberson MD    "

## 2019-02-08 ENCOUNTER — MEDICAL CORRESPONDENCE (OUTPATIENT)
Dept: HEALTH INFORMATION MANAGEMENT | Facility: CLINIC | Age: 69
End: 2019-02-08

## 2019-02-08 RX ORDER — DIAZEPAM 5 MG
5 TABLET ORAL EVERY 6 HOURS PRN
Qty: 2 TABLET | Refills: 0 | Status: SHIPPED | OUTPATIENT
Start: 2019-02-08 | End: 2019-07-12

## 2019-02-08 NOTE — TELEPHONE ENCOUNTER
Detailed message left on patient's VM advising her that we have verified that she is MRI safe.  She can proceed with scheduling for the MRI by calling the imaging center at [phone]  Explained that Valium prescription has been faxed to the [facility] pharmacy and she can take it as prescribed prior to scheduled MRI    RN hotline number [phone] given for patient to call back to verify that she got the message and if she any questions    Harshal Hayward RN

## 2019-02-08 NOTE — TELEPHONE ENCOUNTER
Octavia from [facility] returned call to RN Hotline.   States she does not have the exact op report due to not having electronic chartin when surgery took place but reports patient had open reduction/internal fixation of L wrist.   States metal material would be MRI safe and would be OK to proceed with MRI - however if we wanted exact material would have to contact [facility] where surgery was completed.     Please advise on patient's request for Valium.    Magda Tay RN

## 2019-02-08 NOTE — TELEPHONE ENCOUNTER
Valium prescription faxed to [facility] pharmacy at [phone].  Camila Rudolph,

## 2019-02-11 PROBLEM — R32 URINARY INCONTINENCE: Status: ACTIVE | Noted: 2019-02-11

## 2019-02-11 NOTE — TELEPHONE ENCOUNTER
Pt called back to the RN hotline. Provided number for imaging scheduling and advised that her prescription for valium was sent to [facility]. Pt had no further questions or concerns.    Stacey Alexander RN  [facility]

## 2019-02-12 ENCOUNTER — NURSE TRIAGE (OUTPATIENT)
Dept: NURSING | Facility: CLINIC | Age: 69
End: 2019-02-12

## 2019-02-12 NOTE — TELEPHONE ENCOUNTER
Reason for Disposition    [1] Diabetes mellitus or weak immune system (e.g., HIV positive, cancer chemo, splenectomy, organ transplant, chronic steroids) AND [2] new onset of fever > 100.5 F (38.1 C)    Additional Information    Negative: Severe difficulty breathing (e.g., struggling for each breath, speaks in single words)    Negative: Sounds like a life-threatening emergency to the triager    Negative: [1] Recent hospitalization for pneumonia AND [2] taking an antibiotic    Negative: [1] Animal bite infection AND [2] taking an antibiotic    Negative: [1] Ear  infection (Otitis Media) AND [2] taking an antibiotic    Negative: [1] Ear  infection (Swimmer's Ear)) AND [2] taking an antibiotic    Negative: [1] Sinus infection AND [2] taking an antibiotic    Negative: [1] Strep throat AND [2] taking an antibiotic    Negative: [1] Urinary tract  infection (e.g., cystitis, pyelonephritis, urethritis, epididymitis) AND [2] male AND [3] taking an antibiotic    Negative: [1] Urinary tract  infection (e.g., cystitis, pyelonephritis, urethritis) AND [2] female AND [3] taking an antibiotic    Negative: [1] Wound infection AND [2] taking an antibiotic    Negative: MODERATE difficulty breathing (e.g., speaks in phrases, SOB even at rest, pulse 100-120)    Negative: Fever > 103 F (39.4 C)    Negative: Patient sounds very sick or weak to the triager    Negative: [1] Taking antibiotics > 24 hours AND [2] symptoms WORSE    Negative: [1] Recent hospitalization AND [2] symptoms WORSE    Protocols used: INFECTION ON ANTIBIOTIC FOLLOW-UP CALL-ADULT-  Caller states she is having urinary symptoms, burning stinging pain when going to urinate. Caller states she has a fever, but  Does not have a thermometer. Triage guidelines to go to ED immediately.Caller verbalized and understands directives.

## 2019-02-13 ENCOUNTER — TELEPHONE (OUTPATIENT)
Dept: FAMILY MEDICINE | Facility: CLINIC | Age: 69
End: 2019-02-13

## 2019-02-13 NOTE — TELEPHONE ENCOUNTER
Reason for call:  Letter to be faxed to [place] [employer]  Patient called regarding (reason for call): c pap equipement  Additional comments: Patient states the supply company has not yet received the paper work for the head gear, nose piece and hose. Please call patient back. Thank you.    Phone number to reach patient:  Home number on file [phone] (home)    Best Time:  any    Can we leave a detailed message on this number?  YES

## 2019-02-14 ENCOUNTER — TELEPHONE (OUTPATIENT)
Dept: FAMILY MEDICINE | Facility: CLINIC | Age: 69
End: 2019-02-14

## 2019-02-14 DIAGNOSIS — M54.12 CERVICAL RADICULOPATHY: Primary | ICD-10-CM

## 2019-02-14 RX ORDER — PREDNISONE 20 MG/1
20 TABLET ORAL DAILY
Qty: 7 TABLET | Refills: 0 | Status: SHIPPED | OUTPATIENT
Start: 2019-02-14 | End: 2019-07-12

## 2019-02-14 NOTE — TELEPHONE ENCOUNTER
Patient has the MRI scheduled for 2/20/19    Buffy stark.  She is asking if prednisone would interfere with the contrast dye used for the MRI or not.  No need to call back unless there are issues    Harshal Hayward RN

## 2019-02-14 NOTE — TELEPHONE ENCOUNTER
Reason for Call:  Other prescription    Detailed comments:  Buffy calling. The lidocain patches need a prior auth, but they are not helping her with pain. Can she get a prednisone taper? Please call and let know.     Phone Number Patient can be reached at: Other phone number:  [phone]    Best Time:  Any     Can we leave a detailed message on this number? YES    Call taken on 2/14/2019 at 10:03 AM by Daisy Ramos

## 2019-02-14 NOTE — TELEPHONE ENCOUNTER
Given the fact that her symptoms are suggestive of cervical radiculopathy we can try prednisone. She really needs that cervical spine MRI study ! Please notify patient of this information, prescription is sent to the pharmacy     See prescription     Mateus Roberson MD

## 2019-02-14 NOTE — TELEPHONE ENCOUNTER
Spoke to customer service at [facility] ([phone]).  They verified that they have received all that was needed from us.    They verified patient's phone with me and will call patient back. Camila Rudolph,

## 2019-02-15 DIAGNOSIS — E78.5 HYPERLIPIDEMIA LDL GOAL <100: ICD-10-CM

## 2019-02-15 RX ORDER — PRAVASTATIN SODIUM 40 MG
TABLET ORAL
Qty: 90 TABLET | Refills: 3 | Status: SHIPPED | OUTPATIENT
Start: 2019-02-15

## 2019-02-18 DIAGNOSIS — M54.2 NECK PAIN: Primary | ICD-10-CM

## 2019-02-18 DIAGNOSIS — M54.12 CERVICAL RADICULOPATHY: ICD-10-CM

## 2019-02-18 RX ORDER — LIDOCAINE 50 MG/G
1 PATCH TOPICAL EVERY 24 HOURS
Qty: 30 PATCH | Refills: 3 | Status: SHIPPED | OUTPATIENT
Start: 2019-02-18 | End: 2019-07-12

## 2019-02-18 RX ORDER — PREDNISONE 20 MG/1
TABLET ORAL
Qty: 7 TABLET | Refills: 0 | OUTPATIENT
Start: 2019-02-18

## 2019-02-18 NOTE — TELEPHONE ENCOUNTER
Routing refill request to provider for review/approval because:  Drug not on the [facility] refill protocol, not a controlled substance      Requested Prescriptions   Pending Prescriptions Disp Refills     predniSONE (DELTASONE) 20 MG tablet [Pharmacy Med Name: PREDNISONE 20 MG TABLET]  Last Written Prescription Date:  2/14/19  Last Fill Quantity: 7,  # refills: 0   Last office visit: 2/4/2019 with prescribing provider:     Future Office Visit:     7 tablet 0     Sig: TAKE 1 TABLET BY MOUTH EVERY DAY FOR 7 DAYS    There is no refill protocol information for this order        Nadia Calderon, VERONICA - BC

## 2019-02-18 NOTE — TELEPHONE ENCOUNTER
Controlled Substance Refill Request for predniSONE (DELTASONE) 20 MG tablet  Problem List Complete:  No     PROVIDER TO CONSIDER COMPLETION OF PROBLEM LIST AND OVERVIEW/CONTROLLED SUBSTANCE AGREEMENT    Last Written Prescription Date:  2/14/2019  Last Fill Quantity: 7,   # refills: 0    THE MOST RECENT OFFICE VISIT MUST BE WITHIN THE PAST 3 MONTHS. AT LEAST ONE FACE TO FACE VISIT MUST OCCUR EVERY 6 MONTHS. ADDITIONAL VISITS CAN BE VIRTUAL.  (THIS STATEMENT SHOULD BE DELETED.)    [facility] Office Visit with [facility] [place] care provider: 2/4/2019    Future Office visit:     Controlled substance agreement:   Encounter-Level CSA:    There are no encounter-level csa.     Patient-Level CSA:    There are no patient-level csa.         Last Urine Drug Screen: No results found for: CDAUT, No results found for: COMDAT, No results found for: THC13, PCP13, COC13, MAMP13, OPI13, AMP13, BZO13, TCA13, MTD13, BAR13, OXY13, PPX13, BUP13         [email or web address]       checked in past 3 months?  No, route to RN

## 2019-02-18 NOTE — TELEPHONE ENCOUNTER
This was a one time only script.  This is not something she should continue and I don't think a refill is appropriate at this time.    Carole Decker, CNP

## 2019-02-18 NOTE — TELEPHONE ENCOUNTER
Spoke with pt. States she never got the patches, didn't have them and insurance did not cover them. Reviewed med list and lidocaine patches have not been prescribed for pt. Only listed as facility administered medications. She is requesting a script for lidocaine patches for her chronic neck pain. Has an MRI on Wed for her neck, shoulder and arm. Please advise.    Stacey Alexander RN  [facility]

## 2019-02-18 NOTE — TELEPHONE ENCOUNTER
Lidocaine 5%patch      Last Written Prescription Date:  ?  Last Fill Quantity: ?,   # refills: ?  Last Office Visit: 02/04/2019  Future Office visit:       Routing refill request to provider for review/approval because:  Drug not active on patient's medication list        Received fax from [employer] phone number is [phone].

## 2019-02-18 NOTE — TELEPHONE ENCOUNTER
Please clarify the request- original prescription was written only 4 days ago for a 7 day course. Did patient receive this?   Marlin Castillo, CNP

## 2019-02-18 NOTE — TELEPHONE ENCOUNTER
"Spoke with patient. Reports she got the original Rx and states she doesn't want to run out before Dr. Roberson returns. Advised her this Rx was only originally sent for 7 days, asked patient if she was told she needs to refill this. Patient stated \"no, he never said that but I don't want to run out\". Patient requesting refill.   Has MRI on 2/21/19    Magda Tay RN  "

## 2019-02-21 ENCOUNTER — TELEPHONE (OUTPATIENT)
Dept: FAMILY MEDICINE | Facility: CLINIC | Age: 69
End: 2019-02-21

## 2019-02-21 NOTE — TELEPHONE ENCOUNTER
Reason for call:  Other   Patient called regarding (reason for call): call back  Additional comments: Buffy from [facility] is calling because the patient could not do her MRI because she could not fit and needs a open sided MRI machine. She is wondering if a order can be sent to [facility] because they have an open sided machine. Please fax order to [phone] and call Buffy back    Phone number to reach patient:  Other phone number:  [phone]    Best Time:  any    Can we leave a detailed message on this number?  YES

## 2019-02-22 NOTE — TELEPHONE ENCOUNTER
Cervical spine MRI order faxed to [facility] at [phone].  LM for Buffy that this was done.  Camila Rudolph,

## 2019-02-25 ENCOUNTER — TELEPHONE (OUTPATIENT)
Dept: FAMILY MEDICINE | Facility: CLINIC | Age: 69
End: 2019-02-25

## 2019-02-27 ENCOUNTER — TELEPHONE (OUTPATIENT)
Dept: INTERNAL MEDICINE | Facility: CLINIC | Age: 69
End: 2019-02-27

## 2019-02-27 ENCOUNTER — TELEPHONE (OUTPATIENT)
Dept: FAMILY MEDICINE | Facility: CLINIC | Age: 69
End: 2019-02-27

## 2019-02-27 DIAGNOSIS — J34.89 NASAL DRAINAGE: Primary | ICD-10-CM

## 2019-02-27 DIAGNOSIS — R04.0 NASAL BLEEDING: ICD-10-CM

## 2019-02-27 NOTE — TELEPHONE ENCOUNTER
Per previous phone encounter, Dr. Roberson recommends an OV after the MRI (cervical) to review results and discuss treatment  Patient has MRI scheduled tomorrow at [facility].  She asked that we call her to schedule an appointment with Dr. Roberson once we get the results     Harshal Hayward RN

## 2019-02-27 NOTE — TELEPHONE ENCOUNTER
Reason for call:  Symptom   Symptom or request: Pain neck, shoulder and arm    Duration (how long have symptoms been present): 20 days  Have you been treated for this before? Yes    Additional comments: Patient has been to ER department twice. Patient states she has severe pain and tylenol is not helping. She is scheduled to have MRI done tomorrow. States she has large blood clots when blowing her nose also. Call to advise.    Phone number to reach patient:  Home number on file [phone] (home)    Best Time:  asap    Can we leave a detailed message on this number?  NO

## 2019-02-27 NOTE — TELEPHONE ENCOUNTER
1. We are trying to establish an effective plan of care with her arm / neck pain issues . Further follow up will come from review of MRI results . It's not a good treatment to shower people with opioid pain medication and I am hoping to be able to recommend steroid injection and we can possibly try Gabapentin [Neurontin] and Cymbalta (duloxetine) versus Diclofenac (VOLTAREN) 1 % GEL , we probably need to see her for a face to face encounter to review her cervical spine MRI study and discuss options    2. She's having persistent and significant amounts of episodic and nasal drainage. Other then recommending nasal spray like saline / ocean spray / mist, and could try fluticasone (FLONASE) 50 MCG/ACT nasal spray . But if these symptoms are just unremitting she's probably got to go to the Ear, Nose, and Throat specialist MD . If she's interested to pursue that track , please place referral orders for this    Mateus Roberson MD

## 2019-02-27 NOTE — TELEPHONE ENCOUNTER
1. Patient asked that we call her once we get the results to schedule- new phone encounter started     2. She is already using flonase. She asked for a referral to see ENT. Referral placed. She stated she has too many appts right now. She will call to schedule with ENT when she is ready.     Harshal Hayward RN

## 2019-02-27 NOTE — TELEPHONE ENCOUNTER
Spoke with patient  She is still in pain and tylenol is not helpful  She has her MRI scheduled for tomorrow 2/28/19 at [facility]  Advised patient that Dr. Roberson needs the results before he can determine treatment  She verbalized understanding    She stated that for months, she has noted bloody drainage/clots from her nose when she blows her nose only.  No active bleeding noted.  She denies the air in the home feeling too dry  She stated that she has mentioned this before at previous office visits.  Denies any other symptoms besides the stuffy nose and bloody drainage.     Please advise on any pain management or would we need to await MRI results first  Please advise on bloody drainage/clots when blowing nose    Ok to leave detailed message on patient's VM    Harshal Hayward RN

## 2019-02-28 ENCOUNTER — TRANSFERRED RECORDS (OUTPATIENT)
Dept: HEALTH INFORMATION MANAGEMENT | Facility: CLINIC | Age: 69
End: 2019-02-28

## 2019-03-01 ENCOUNTER — PATIENT OUTREACH (OUTPATIENT)
Dept: CARE COORDINATION | Facility: CLINIC | Age: 69
End: 2019-03-01

## 2019-03-01 DIAGNOSIS — F20.9 SCHIZOPHRENIA, UNSPECIFIED TYPE (H): Primary | ICD-10-CM

## 2019-03-01 NOTE — TELEPHONE ENCOUNTER
Patient has appointment scheduled to see Dr. Roberson on Tuesday, March 12th at 11:50 a.m. Camila Rudolph,

## 2019-03-01 NOTE — PROGRESS NOTES
Clinic Care Coordination Contact 3/1/19  Care Team Conversations-SW    Phone call received from the pt who wanted to express concern that she couldn't get into see her PCP sooner than 3/12. She states she is in a lot of pain.    Conversation with her if this was new pain, or pre-existing. She explained that she just had an MRI yesterday and she has had the pain for 38 days. It is located in her back, neck, shoulder, etc. I offered her to see another provider and she refused.    SW will not be following this pt, but please refer if needs arise.    Tonya Blount, [facility]  Care Coordinator Social Work    [facility]  [phone]  3/1/2019 10:53 AM

## 2019-03-04 ENCOUNTER — MEDICAL CORRESPONDENCE (OUTPATIENT)
Dept: HEALTH INFORMATION MANAGEMENT | Facility: CLINIC | Age: 69
End: 2019-03-04

## 2019-03-18 ENCOUNTER — OFFICE VISIT (OUTPATIENT)
Dept: FAMILY MEDICINE | Facility: CLINIC | Age: 69
End: 2019-03-18
Payer: COMMERCIAL

## 2019-03-18 VITALS
HEART RATE: 134 BPM | WEIGHT: 293 LBS | SYSTOLIC BLOOD PRESSURE: 132 MMHG | OXYGEN SATURATION: 95 % | HEIGHT: 64 IN | TEMPERATURE: 98 F | BODY MASS INDEX: 50.02 KG/M2 | RESPIRATION RATE: 16 BRPM | DIASTOLIC BLOOD PRESSURE: 84 MMHG

## 2019-03-18 DIAGNOSIS — M54.12 CERVICAL RADICULOPATHY: ICD-10-CM

## 2019-03-18 DIAGNOSIS — R30.0 DYSURIA: ICD-10-CM

## 2019-03-18 DIAGNOSIS — Z12.31 VISIT FOR SCREENING MAMMOGRAM: ICD-10-CM

## 2019-03-18 DIAGNOSIS — M50.30 DDD (DEGENERATIVE DISC DISEASE), CERVICAL: ICD-10-CM

## 2019-03-18 DIAGNOSIS — R82.90 NONSPECIFIC FINDING ON EXAMINATION OF URINE: ICD-10-CM

## 2019-03-18 DIAGNOSIS — R80.9 MICROALBUMINURIA: ICD-10-CM

## 2019-03-18 LAB
ALBUMIN UR-MCNC: 30 MG/DL
APPEARANCE UR: ABNORMAL
BACTERIA #/AREA URNS HPF: ABNORMAL /HPF
BILIRUB UR QL STRIP: NEGATIVE
COLOR UR AUTO: YELLOW
CREAT UR-MCNC: 116 MG/DL
GLUCOSE UR STRIP-MCNC: NEGATIVE MG/DL
HGB BLD-MCNC: 10 G/DL (ref 11.7–15.7)
HGB UR QL STRIP: ABNORMAL
KETONES UR STRIP-MCNC: NEGATIVE MG/DL
LEUKOCYTE ESTERASE UR QL STRIP: ABNORMAL
MICROALBUMIN UR-MCNC: 282 MG/L
MICROALBUMIN/CREAT UR: 243.1 MG/G CR (ref 0–25)
NITRATE UR QL: POSITIVE
NON-SQ EPI CELLS #/AREA URNS LPF: ABNORMAL /LPF
PH UR STRIP: 5.5 PH (ref 5–7)
RBC #/AREA URNS AUTO: ABNORMAL /HPF
SOURCE: ABNORMAL
SP GR UR STRIP: 1.02 (ref 1–1.03)
UROBILINOGEN UR STRIP-ACNC: 0.2 EU/DL (ref 0.2–1)
WBC #/AREA URNS AUTO: >100 /HPF

## 2019-03-18 PROCEDURE — 87088 URINE BACTERIA CULTURE: CPT | Performed by: INTERNAL MEDICINE

## 2019-03-18 PROCEDURE — 81001 URINALYSIS AUTO W/SCOPE: CPT | Performed by: INTERNAL MEDICINE

## 2019-03-18 PROCEDURE — 87086 URINE CULTURE/COLONY COUNT: CPT | Performed by: INTERNAL MEDICINE

## 2019-03-18 PROCEDURE — 82043 UR ALBUMIN QUANTITATIVE: CPT | Performed by: INTERNAL MEDICINE

## 2019-03-18 PROCEDURE — 36415 COLL VENOUS BLD VENIPUNCTURE: CPT | Performed by: INTERNAL MEDICINE

## 2019-03-18 PROCEDURE — 99214 OFFICE O/P EST MOD 30 MIN: CPT | Performed by: INTERNAL MEDICINE

## 2019-03-18 PROCEDURE — 85018 HEMOGLOBIN: CPT | Performed by: INTERNAL MEDICINE

## 2019-03-18 PROCEDURE — 87186 SC STD MICRODIL/AGAR DIL: CPT | Performed by: INTERNAL MEDICINE

## 2019-03-18 ASSESSMENT — MIFFLIN-ST. JEOR: SCORE: 2017.33

## 2019-03-18 NOTE — PROGRESS NOTES
SUBJECTIVE:   Marina Valdez is a 68 year old female who presents to clinic today for the following health issues:       Uncontrolled type 2 diabetes mellitus with stage 3 chronic kidney disease, with long-term current use of insulin (H)  Visit for screening mammogram  Dysuria  Microalbuminuria  DDD (degenerative disc disease), cervical  Cervical radiculopathy     Here to discuss acute symptoms of a possible urinary tract infection as well as to review cervical spine MRI study and talk about her symptoms. See as detailed below with the assessment and plan section      URINARY TRACT SYMPTOMS  Onset: x 1 day    Description:   Painful urination (Dysuria): no   Blood in urine (Hematuria): YES- might be  Delay in urine (Hesitency): YES    Intensity: moderate    Progression of Symptoms:  worsening    Accompanying Signs & Symptoms:  Fever/chills: YES, patient reports feels feverish  Flank pain no   Nausea and vomiting: no   Any vaginal symptoms: none  Abdominal/Pelvic Pain: no     History:   History of frequent UTI's: YES  History of kidney stones: no   Sexually Active: no   Possibility of pregnancy: No    Precipitating factors:   none    Therapies Tried and outcome: water    Problem list and histories reviewed & adjusted, as indicated.  Additional history: as documented    Patient Active Problem List   Diagnosis     Hypertension goal BP (blood pressure) < 140/90     Recurrent genital HSV (herpes simplex virus) infection     Intertrigo     Ovarian Mass s/p excision in 2008     Sebaceous cyst     Microalbuminuria     Hyperlipidemia with target LDL less than 100     Chest pain at rest x1 episode- resolved at rest 3 hours, assoc with food     Obstructive sleep apnea (on cpap)     24 hour contact given to patient      Hydradenitis     CKD (chronic kidney disease) stage 3, GFR 30-59 ml/min (H)     Compulsive skin picking     Need for prophylactic vaccination and inoculation against influenza     High risk OTC medication or  supplement use     Rotator cuff tear     Morbid obesity due to excess calories (H)     Chronic bilateral low back pain without sciatica     Primary osteoarthritis of both hips     Schizophrenia, unspecified type (H)     Uncontrolled type 2 diabetes mellitus with stage 3 chronic kidney disease, with long-term current use of insulin (H)     Endometrial cancer, grade I (H)     Impetigo     Health Care Home     Pruritus ani     Urinary incontinence     Past Surgical History:   Procedure Laterality Date     cataract      bilateral     CYSTOSCOPY N/A 2016    Procedure: CYSTOSCOPY;  Surgeon: Franca Rousseau MD;  Location: [facility] OR     DAVINCI HYSTERECTOMY TOTAL, BILATERAL SALPINGO-OOPHORECTOMY, COMBINED N/A 2016    Procedure: COMBINED DAVINCI HYSTERECTOMY TOTAL, SALPINGO-OOPHORECTOMY;  Surgeon: Franca Rousseau MD;  Location: [facility] OR     LAPAROSCOPY      for endometriosis     OOPHORECTOMY      right     WRIST SURGERY      right       Social History     Tobacco Use     Smoking status: Former Smoker     Packs/day: 1.00     Years: 0.00     Pack years: 0.00     Types: Cigarettes     Last attempt to quit: 1990     Years since quittin.2     Smokeless tobacco: Never Used     Tobacco comment: quit    Substance Use Topics     Alcohol use: No     Family History   Problem Relation Age of Onset     Heart Disease Mother      Diabetes Mother      Hypertension Mother      Psychotic Disorder Mother         schitzophrenia     Hypertension Father      Blood Disease Father         high iron level     Heart Disease Maternal Grandmother      Cerebrovascular Disease Maternal Grandmother      Heart Disease Maternal Grandfather      Breast Cancer Paternal Grandmother      Breast Cancer Sister      Heart Disease Brother      Ovarian Cancer Paternal Aunt      Kidney Cancer Daughter      Uterine Cancer Daughter      Uterine Cancer Sister          Current Outpatient Medications   Medication Sig Dispense Refill      acetaminophen (TYLENOL) 500 MG tablet Take 500 mg by mouth       aspirin 81 MG tablet Take 1 tablet (81 mg) by mouth daily 100 tablet 3     B-D U/F 31G X 8 MM insulin pen needle USE FOUR TIMES DAILY AS DIRECTED WITH LANTUS AND PREMEAL INSULIN 500 each 3     blood glucose (ONETOUCH VERIO IQ) test strip TEST THREE TIMES DAILY OR AS DIRECTED 100 strip 3     blood glucose monitoring (NO BRAND SPECIFIED) meter device kit Use to test blood sugar 3 times daily or as directed. 1 kit 0     blood glucose monitoring (NO BRAND SPECIFIED) test strip Use to test blood sugars 3 times daily or as directed 300 strip 3     blood glucose monitoring (ONE TOUCH DELICA) lancets Use to test blood sugars 3 times daily or as directed. Qty of 1 box= 100 lancets 300 each 3     cholecalciferol 1000 UNITS TABS Take 1 tablet by mouth daily       diazepam (VALIUM) 5 MG tablet Take 1 tablet (5 mg) by mouth every 6 hours as needed for anxiety For pre-MRI treatment of claustraphobia, take one pill 90 minutes prior to MRI. Can take a second pill 45 minutes before procedure if necessary. 2 tablet 0     diphenhydrAMINE (BENADRYL) 25 MG tablet Take 25 mg by mouth At Bedtime And 25 mg nightly as needed       fluticasone (FLONASE) 50 MCG/ACT nasal spray Spray 1-2 sprays into both nostrils daily 1 Bottle 11     HUMALOG KWIKPEN 100 UNIT/ML soln INJECT 15 UNITS SUBCUTANEOUS 3 TIMES DAILY (BEFORE MEALS) 3 mL 3     hydrocortisone valerate (WEST-JOE) 0.2 % ointment Apply sparingly to affected area three times daily for 14 days. 15 g 0     insulin pen needle (B-D U/F) 31G X 8 MM Use 5 pen needles daily or as directed. 500 each 0     lidocaine (LIDODERM) 5 % patch Place 1 patch onto the skin every 24 hours 30 patch 3     liraglutide (VICTOZA PEN) 18 MG/3ML solution Inject 1.8 MG DAILY 3 mL 2     nystatin (MYCOSTATIN) 277671 UNIT/GM POWD APPLY TO GROIN AND UNDER BREAST TWICE DAILY UNTIL REDNESS HAS RESOLVED. 120 g 1     order for DME Equipment being ordered:  CPAP along with all associated supplies including head gear with nose mask and hose 1 Device 0     order for DME Equipment being ordered: Women's incontinence pads/liners, 3 per day 3 Month 11     order for DME Equipment being ordered: CPAP 1 Device 0     order for DME Equipment being ordered: home blood pressure cuff for home blood pressure monitoring 1 Device 0     order for DME Equipment being ordered: CPAP machine set at 15 pressure, heated humidifier, supplies: mask and tubing ( supplies) 1 Device 1     order for DME Equipment being ordered: corset to prevent compulsive skin picking of abdomen 1 Device 0     order for DME Equipment being ordered: Lift Chair 1 each 0     ORDER FOR DME Equipment being ordered: CPAP and associated supplies and tubing 1 Device 0     PAROXETINE HCL 40 MG OR TABS Take 80 mg by mouth daily. 80mg=2 tablets.        pravastatin (PRAVACHOL) 40 MG tablet TAKE 1 TABLET BY MOUTH EVERY DAY 90 tablet 3     senna-docusate (SENOKOT-S;PERICOLACE) 8.6-50 MG per tablet Take 2 tablets by mouth 2 times daily as needed for constipation       TOUJEO SOLOSTAR 300 UNIT/ML injection INJECT 100 UNITS SUBCUTANEOUS AT BEDTIME 27 mL 0     traMADol (ULTRAM) 50 MG tablet Take 1 tablet (50 mg) by mouth 2 times daily 28 days or more between refills for controlled medications 60 tablet 2     valACYclovir (VALTREX) 500 MG tablet TAKE 1 TABLET (500 MG) BY MOUTH DAILY 90 tablet 1     valsartan-hydrochlorothiazide (DIOVAN HCT) 160-25 MG tablet Take 1 tablet by mouth daily 90 tablet 1     ziprasidone (GEODON) 80 MG capsule Take 160 mg by mouth every morning And 80 mg daily at 10 AM, and 80 mg nightly as needed       Allergies   Allergen Reactions     Bactroban [Mupirocin] Itching     Other [Seasonal Allergies]      Hayfever     Recent Labs   Lab Test 02/04/19  1255 08/31/18  1328  10/17/17  1151 07/10/17  1340  03/15/17  1247 03/03/17  1552 11/11/16  1108 06/14/16  1338   A1C 6.8* 10.0*  --  10.4* 8.4*   < >  --   --   "9.4* 10.2*   LDL 26  --   --  29 49  --   --   --   --  42   HDL 44*  --   --  71  --   --   --   --   --  69   TRIG 71  --   --  109  --   --   --   --   --  90   ALT  --   --   --  24  --   --  27 45  --   --    CR 1.58* 1.32*   < > 1.36*  --    < >  --  1.26* 1.31*  --    GFRESTIMATED 34* 40*   < > 39*  --    < >  --  43* 41*  --    GFRESTBLACK 39* 49*   < > 47*  --    < >  --  52* 49*  --    POTASSIUM 3.9 4.3   < > 3.7  --    < >  --  3.5 3.7  --    TSH 2.17  --   --   --   --   --   --   --  2.65  --     < > = values in this interval not displayed.      BP Readings from Last 3 Encounters:   03/18/19 132/84   09/18/18 132/86   08/31/18 120/74    Wt Readings from Last 3 Encounters:   03/18/19 149.2 kg (329 lb)   02/04/19 144.9 kg (319 lb 8 oz)   09/18/18 (!) 151 kg (333 lb)                  Labs reviewed in EPIC    Reviewed and updated as needed this visit by clinical staff       Reviewed and updated as needed this visit by Provider         ROS:  Constitutional, HEENT, cardiovascular, pulmonary, gi and gu systems are negative, except as otherwise noted.    OBJECTIVE:                                                    /84   Pulse 134   Temp 98  F (36.7  C) (Oral)   Resp 16   Ht 1.626 m (5' 4\")   Wt 149.2 kg (329 lb)   LMP 03/24/2010   SpO2 95%   BMI 56.47 kg/m    Body mass index is 56.47 kg/m .  GENERAL APPEARANCE: healthy, alert and no distress  EYES: Eyes grossly normal to inspection, PERRL and conjunctivae and sclerae normal  NEURO: Normal strength and tone, mentation intact and speech normal  PSYCH: mentation appears normal and affect normal/bright    Diagnostic test results:  Diagnostic Test Results:  Orders Placed This Encounter   Procedures     MA SCREENING DIGITAL BILAT - Future  (s+30)     Hemoglobin     Albumin Random Urine Quantitative with Creat Ratio     *UA reflex to Microscopic and Culture ([facility] (except [place] and [place])     UA with Microscopic reflex to " Culture     NEUROSURGERY REFERRAL          ASSESSMENT/PLAN:                                                    1. Uncontrolled type 2 diabetes mellitus with stage 3 chronic kidney disease, with long-term current use of insulin (H)  Patients last hemoglobin a1c  [ diabetes test ] from February was wonderful !! She's not due for the next hemoglobin a1c  [ diabetes test ] until around May or June 2019  - Albumin Random Urine Quantitative with Creat Ratio    2. Visit for screening mammogram  She agrees to schedule  - MA SCREENING DIGITAL BILAT - Future  (s+30); Future    3. Dysuria  I want patient to wait here in clinic until she can produce a specimen  - *UA reflex to Microscopic and Culture ([place] [facility] (except [place] and [place])  - UA with Microscopic reflex to Culture    4. Microalbuminuria  Due for recheck   - Hemoglobin  - Albumin random urine quantitative     5. DDD (degenerative disc disease), cervical  It's possible her neck CT scan does show a disease processes amenable to treatment with steroid injection . Doubt need for surgery    - Hemoglobin  - NEUROSURGERY REFERRAL    6. Cervical radiculopathy  As detailed above   - Hemoglobin  - NEUROSURGERY REFERRAL      Follow up with Provider - depending on the test results      Mateus Roberson MD  [facility]

## 2019-03-20 ENCOUNTER — TELEPHONE (OUTPATIENT)
Dept: INTERNAL MEDICINE | Facility: CLINIC | Age: 69
End: 2019-03-20

## 2019-03-20 DIAGNOSIS — N39.0 URINARY TRACT INFECTION: Primary | ICD-10-CM

## 2019-03-20 LAB
BACTERIA SPEC CULT: ABNORMAL
SPECIMEN SOURCE: ABNORMAL

## 2019-03-20 RX ORDER — LIRAGLUTIDE 6 MG/ML
INJECTION SUBCUTANEOUS
Qty: 9 ML | Refills: 2 | Status: SHIPPED | OUTPATIENT
Start: 2019-03-20 | End: 2019-06-23

## 2019-03-20 RX ORDER — SULFAMETHOXAZOLE/TRIMETHOPRIM 800-160 MG
1 TABLET ORAL 2 TIMES DAILY
Qty: 14 TABLET | Refills: 0 | Status: SHIPPED | OUTPATIENT
Start: 2019-03-20 | End: 2019-07-12

## 2019-03-20 NOTE — TELEPHONE ENCOUNTER
"Prescription approved per [facility] Refill Protocol.    Requested Prescriptions   Signed Prescriptions Disp Refills     liraglutide (VICTOZA PEN) 18 MG/3ML solution 9 mL 2     Sig: INJECT 1.8 MG UNDER THE SKIN DAILY    GLP-1 Agonists Protocol Failed - 3/19/2019  8:09 AM       Failed - Normal serum creatinine on file in past 12 months    Recent Labs   Lab Test 02/04/19  1255   CR 1.58*            Passed - Blood pressure less than 140/90 in past 6 months    BP Readings from Last 3 Encounters:   03/18/19 132/84   09/18/18 132/86   08/31/18 120/74                Passed - LDL on file in past 12 months    Recent Labs   Lab Test 02/04/19  1255   LDL 26            Passed - Microalbumin on file in past 12 months    Recent Labs   Lab Test 03/18/19  1452   MICROL 282   UMALCR 243.10*            Passed - HgbA1C in past 3 or 6 months    If HgbA1C is 8 or greater, it needs to be on file within the past 3 months.  If less than 8, must be on file within the past 6 months.     Recent Labs   Lab Test 02/04/19  1255   A1C 6.8*            Passed - Medication is active on med list       Passed - Patient is age 18 or older       Passed - No active pregnancy on record       Passed - No positive pregnancy test in past 12 months       Passed - Recent (6 mo) or future (30 days) visit within the authorizing provider's specialty    Patient had office visit in the last 6 months or has a visit in the next 30 days with authorizing provider.  See \"Patient Info\" tab in inbasket, or \"Choose Columns\" in Meds & Orders section of the refill encounter.            Stacey Alexander, RN  [facility]    "

## 2019-03-20 NOTE — TELEPHONE ENCOUNTER
Notes recorded by Magda Tay RN on 3/20/2019 at 5:14 PM CDT  Left message for patient to call RN hotline [phone].   See TE. Sent message to Dr. Roberson to specify dose of Bactrim.    Magda Tay RN  ------    Notes recorded by Mateus Roberson MD on 3/20/2019 at 5:05 PM CDT  This urine analysis and urine culture does confirm the presence of infection with Escherichia coli . This is sensitive to sulfamethoxazole-trimethoprim (BACTRIM DS,SEPTRA DS) and we should send prescription for 2 times a day dosing times 7 days     Mateus Tay RN

## 2019-03-20 NOTE — TELEPHONE ENCOUNTER
Per Dr. Roberson:  Notes recorded by Mateus Roberson MD on 3/20/2019 at 5:22 PM CDT  [phone]    Mateus Roberson MD      Rx sent to pharmacy.     Magda Tay RN

## 2019-03-20 NOTE — TELEPHONE ENCOUNTER
Patient returned call to RN Hotline. Gave results.   Advised her we're still waiting on clarification for dosing and will send into [facility] pharmacy when we receive clarification   Pt verbalized understanding & no further questions.     Magda Tay, RN

## 2019-03-21 ENCOUNTER — PATIENT OUTREACH (OUTPATIENT)
Dept: GERIATRIC MEDICINE | Facility: CLINIC | Age: 69
End: 2019-03-21

## 2019-03-21 NOTE — PROGRESS NOTES
[facility] Care Coordination Contact      [facility] Six-Month Telephone Assessment    6 month telephone assessment completed on 3/21/19 .    ER visits: No  Hospitalizations: No  TCU stays: No  Significant health status changes: Issues concerning her back   Falls/Injuries: No  ADL/IADL changes: No  Changes in services: No    Caregiver Assessment follow up:  NA    Goals: See POC in chart for goal progress documentation.     Will see member in 6 months for an annual health risk assessment.   Encouraged member to call CC with any questions or concerns in the meantime.     REGINA Grier  [facility]  [phone]

## 2019-03-22 ENCOUNTER — TELEPHONE (OUTPATIENT)
Dept: FAMILY MEDICINE | Facility: CLINIC | Age: 69
End: 2019-03-22

## 2019-03-25 ENCOUNTER — TELEPHONE (OUTPATIENT)
Dept: FAMILY MEDICINE | Facility: CLINIC | Age: 69
End: 2019-03-25

## 2019-03-25 NOTE — TELEPHONE ENCOUNTER
Reason for call:  Patient reporting a symptom    Symptom or request:  Fever, uti.    Duration (how long have symptoms been present):  1 wk,     Have you been treated for this before? Yes    Additional comments:  Any     Phone Number patient can be reached at:  Home number on file [phone] (home)    Best Time:   Any     Can we leave a detailed message on this number:  YES    Call taken on 3/25/2019 at 1:43 PM by Daisy Ramos

## 2019-03-25 NOTE — TELEPHONE ENCOUNTER
Called and spoke with patient. Reports that for the past 3-4 days she has been feeling really warm and a little sweaty. Reports her daughter said she feels hot too.   Patient does not have a thermometer and unable to check temperature.  Patient was seen on 3/18/19 and did not have a fever at that time but was diagnosed with UTI and prescribed Bactrim. Has 4 days left of antibiotic.   Patient denies any pain or burning with urination and is only have hot symptoms and a mild sore throat.   Has been taking Tylenol to help with possible fever.   Took BG about 45 minutes ago and was 210 and this is normal for her per patient.  Advised patient she should be seen due to possible fever and symptoms.   Patient states she cannot walk because she's feeling too warm. Advised patient her temperature was normal at her last appointment and if she thinks it's high, she needs to be seen.   Advised to take Tylenol, drink plenty of fluids, monitor BG levels, can use cold cloths on forehead, neck, armpit.   Advised patient on multiple UC locations and advised her she should be seen.       Magda Tay RN

## 2019-03-28 ENCOUNTER — TELEPHONE (OUTPATIENT)
Dept: FAMILY MEDICINE | Facility: CLINIC | Age: 69
End: 2019-03-28

## 2019-03-28 NOTE — TELEPHONE ENCOUNTER
Reason for Call:  Other prescription    Detailed comments:  Patient calling. She was given a rx for her bladder infection. She is still running a fever. Please call and advise.     Phone Number Patient can be reached at: Home number on file [phone] (home)    Best Time:  Any     Can we leave a detailed message on this number? YES    Call taken on 3/28/2019 at 12:33 PM by Daisy Ramos

## 2019-03-28 NOTE — TELEPHONE ENCOUNTER
Reason for call:  Other   Patient called regarding (reason for call): call back  Additional comments: Buffy from [facility] home care is calling wanting to discuss the patients blood sugar. Please call back    Phone number to reach patient:  Other phone number:  [phone]    Best Time:  any    Can we leave a detailed message on this number?  YES

## 2019-03-28 NOTE — TELEPHONE ENCOUNTER
"Colette called  She stated that she did not see any major concerns but is just reporting this per patient's request  Patient has been taking Toujeo around 9:30PM every night but is concerned that her BGs are dropping too low  However, when Colette reviewed BG numbers, the numbers looked fine  Patient's daughter checks patient's BG but for some reason, no FBG numbers were listed  Mainly only HS BG result and results during the night when they are checking frequently d/t concerns that BG might drop too low    3/21/19 9:02 PM BG was 108  10:02 PM BG was 131    3/22/19 1:22 AM BG was 88, 10 min later was still 88  At 1:51 AM BG was 92   2:12 AM BG was 104  9:48 PM BG was 83 before taking Toujeo    Explained that Toujeo is a long acting insulin and would not cause immediately drop in BG so patient does not need to be checking BG so often after taking it.  Advised Colette to have patient's daughter record FBG and BG at other times of the day as well.   Nighttime BG numbers alone would not be helpful and in addition to that, the numbers provided have been within normal range  She verbalized understanding and did advise patient on the above but will call again to reinforce    She stated that patient is also aware that she is supposed to see DM ed but for some reason has not scheduled, \"maybe she is scared\"  Will continue to encourage    Routing to provider as FYI only as Colette told patient she would relay her concerns to provider  Per Colette, patient is aware to expect no changes in meds unless clinic calls Colette back with new orders  No call back needed unless there are changes    Harshal Hayward RN  "

## 2019-03-28 NOTE — TELEPHONE ENCOUNTER
Colette called back.  She stated that patient believes she is running a fever and feels warm, however, when Colette checked temp with thermometer and felt skin temp, everything was normal   Colette did also explain to patient that she should check temp prior to taking tylenol or wait at least a few hours after taking Tylenol to check temp again  She also advised that patient report or be seen if UTI symptoms return  Per Colette, patient did not c/o sore throat to her or any other symptoms    Harshal Hayward RN

## 2019-04-01 ENCOUNTER — OFFICE VISIT (OUTPATIENT)
Dept: FAMILY MEDICINE | Facility: CLINIC | Age: 69
End: 2019-04-01
Payer: COMMERCIAL

## 2019-04-01 VITALS
TEMPERATURE: 97 F | HEART RATE: 130 BPM | OXYGEN SATURATION: 96 % | RESPIRATION RATE: 22 BRPM | BODY MASS INDEX: 39.82 KG/M2 | DIASTOLIC BLOOD PRESSURE: 84 MMHG | WEIGHT: 232 LBS | SYSTOLIC BLOOD PRESSURE: 132 MMHG

## 2019-04-01 DIAGNOSIS — J02.9 SORE THROAT: ICD-10-CM

## 2019-04-01 DIAGNOSIS — R05.9 COUGH: Primary | ICD-10-CM

## 2019-04-01 LAB
DEPRECATED S PYO AG THROAT QL EIA: NORMAL
SPECIMEN SOURCE: NORMAL

## 2019-04-01 PROCEDURE — 87081 CULTURE SCREEN ONLY: CPT | Performed by: PHYSICIAN ASSISTANT

## 2019-04-01 PROCEDURE — 87880 STREP A ASSAY W/OPTIC: CPT | Performed by: PHYSICIAN ASSISTANT

## 2019-04-01 PROCEDURE — 99213 OFFICE O/P EST LOW 20 MIN: CPT | Performed by: PHYSICIAN ASSISTANT

## 2019-04-01 RX ORDER — AZITHROMYCIN 500 MG/1
500 TABLET, FILM COATED ORAL DAILY
Qty: 3 TABLET | Refills: 0 | Status: SHIPPED | OUTPATIENT
Start: 2019-04-01 | End: 2019-07-12

## 2019-04-01 NOTE — PROGRESS NOTES
SUBJECTIVE:   Marina Valdez is a 68 year old female who presents to clinic today for the following health issues:      RESPIRATORY SYMPTOMS      Duration: x2 months    Description  sore throat, cough, chills, ear pain both, headache and stomach ache    Severity: severe    Accompanying signs and symptoms: None    History (predisposing factors):  none    Precipitating or alleviating factors: None    Therapies tried and outcome:  none        Problem list and histories reviewed & adjusted, as indicated.  Additional history: Patient has noticed PND and allergy symptoms as well.  She will take Flonase as previously directed.  Didn't think the recent Bactrim course was helpful for coughing.          Reviewed and updated as needed this visit by clinical staff  Tobacco  Allergies  Meds       ROS:  Constitutional, HEENT, cardiovascular, pulmonary, gi and gu systems are negative, except as otherwise noted.    OBJECTIVE:     /84   Pulse 130   Temp 97  F (36.1  C) (Oral)   Resp 22   Wt 105.2 kg (232 lb)   LMP 03/24/2010   SpO2 96%   BMI 39.82 kg/m    Body mass index is 39.82 kg/m .  GENERAL: healthy, alert and no distress  HENT: normal cephalic/atraumatic, ear canals and TM's normal, nasal mucosa edematous , some residual clot material- Patient did note recent epistaxis, oropharynx clear and oral mucous membranes moist  NECK: no adenopathy, no asymmetry, masses, or scars and thyroid normal to palpation  RESP: lungs clear to auscultation - no rales, rhonchi or wheezes  SKIN: no suspicious lesions or rashes    Diagnostic Test Results:  Results for orders placed or performed in visit on 04/01/19 (from the past 24 hour(s))   Strep, Rapid Screen   Result Value Ref Range    Specimen Description Throat     Rapid Strep A Screen       NEGATIVE: No Group A streptococcal antigen detected by immunoassay, await culture report.       ASSESSMENT/PLAN:   1. Cough > 1 month  - azithromycin (ZITHROMAX) 500 MG tablet; Take 1  tablet (500 mg) by mouth daily for 3 days  Dispense: 3 tablet; Refill: 0    2. Sore throat  - Strep, Rapid Screen  - Beta strep group A culture    Use medication as directed.  Follow up if symptoms should persist, change or worsen.  Patient amenable to this follow up plan.     Chelsi Gomes PA-C  [facility]

## 2019-04-02 LAB
BACTERIA SPEC CULT: NORMAL
SPECIMEN SOURCE: NORMAL

## 2019-04-18 ENCOUNTER — TELEPHONE (OUTPATIENT)
Dept: FAMILY MEDICINE | Facility: CLINIC | Age: 69
End: 2019-04-18

## 2019-04-18 DIAGNOSIS — I10 HYPERTENSION GOAL BP (BLOOD PRESSURE) < 140/90: Primary | ICD-10-CM

## 2019-04-18 RX ORDER — VALSARTAN AND HYDROCHLOROTHIAZIDE 80; 12.5 MG/1; MG/1
1 TABLET, FILM COATED ORAL DAILY
Qty: 90 TABLET | Refills: 0 | Status: SHIPPED | OUTPATIENT
Start: 2019-04-18 | End: 2019-07-18 | Stop reason: ALTCHOICE

## 2019-04-18 NOTE — TELEPHONE ENCOUNTER
Lets have her cut her blood pressure medication into half doses for now. And keep with blood pressure monitoring     I only see she's on Diovan (valsartan) / hydrochlorothiazide right now [ 160/25]    Mateus Roberson MD

## 2019-04-18 NOTE — TELEPHONE ENCOUNTER
Buffy updated with Dr. Roberson's orders below  She will advise patient to cut the losartan-hydrochlorothiazide 160-25mg pills in half for now and continue to monitor BP  New prescription for Losartan-hydrochlorothiazide 80-12.5mg dose sent to the pharmacy    Harshal Hayward RN

## 2019-04-18 NOTE — TELEPHONE ENCOUNTER
Reason for Call:  Other call back and returning call    Detailed comments:  Buffy calling. Her blood pressure's have been running low.  88/53 last week. This week 80/40 and a pulse 90. She is not having dizziness or any symptoms. Please call if any questions.     Phone Number Patient can be reached at: Other phone number:  [phone]    Best Time:  Any     Can we leave a detailed message on this number? YES    Call taken on 4/18/2019 at 11:36 AM by Daisy Ramos

## 2019-04-24 RX ORDER — INSULIN GLARGINE 300 U/ML
INJECTION, SOLUTION SUBCUTANEOUS
Qty: 27 ML | Refills: 1 | Status: SHIPPED | OUTPATIENT
Start: 2019-04-24 | End: 2019-05-07

## 2019-04-28 ENCOUNTER — TELEPHONE (OUTPATIENT)
Dept: NURSING | Facility: CLINIC | Age: 69
End: 2019-04-28

## 2019-04-28 ENCOUNTER — NURSE TRIAGE (OUTPATIENT)
Dept: NURSING | Facility: CLINIC | Age: 69
End: 2019-04-28

## 2019-04-29 NOTE — TELEPHONE ENCOUNTER
Caller contacted  [facility] a second time @ 1950  Caller also would like  Provider to know she thinks she has another bladder infection due to odor and appearance of urine for several days; denies any fever , vomiting or other symptoms   Inquiring if she needs to come in  or could get  a prescription   Please address and contact her  Catalina Steven RN  [facility]

## 2019-04-29 NOTE — TELEPHONE ENCOUNTER
Marina calls and says that she wants to cancel her Neurosurgey appointment for Tuesday at 1 pm. RN then called [facility]  and  says that pt. Needs to call her clinic tomorrow and then that appointment can be cancelled. RN told this to Marina and pt. Voiced understanding.    Additional Information    Negative: [1] Caller is not with the adult (patient) AND [2] reporting urgent symptoms    Negative: Lab result questions    Negative: Medication questions    Negative: Caller cannot be reached by phone    Negative: Caller has already spoken to PCP or another triager    Negative: RN needs further essential information from caller in order to complete triage    Negative: Requesting regular office appointment    Negative: [1] Caller requesting NON-URGENT health information AND [2] PCP's office is the best resource    Negative: Health Information question, no triage required and triager able to answer question    Negative: General information question, no triage required and triager able to answer question    Negative: Question about upcoming scheduled test, no triage required and triager able to answer question    Negative: [1] Caller is not with the adult (patient) AND [2] probable NON-URGENT symptoms    [1] Follow-up call to recent contact AND [2] information only call, no triage required    Protocols used: [facility]

## 2019-04-29 NOTE — TELEPHONE ENCOUNTER
Marina calls and says that she has thrush in her mouth and has had this for 3 days. Marina says that Dr. Roberson is very familiar with her thrush problem and wants Dr. Roberson to get her medicine for this, tomorrow. Pt. Says that she can be called at: [phone], if the  Has any questions. RN then left this message in [facility], as requested.    Additional Information    Negative: [1] Caller is not with the adult (patient) AND [2] reporting urgent symptoms    Negative: Lab result questions    Negative: Medication questions    Negative: Caller cannot be reached by phone    Negative: Caller has already spoken to PCP or another triager    Negative: RN needs further essential information from caller in order to complete triage    Negative: Requesting regular office appointment    Negative: [1] Caller requesting NON-URGENT health information AND [2] PCP's office is the best resource    Negative: Health Information question, no triage required and triager able to answer question    Negative: General information question, no triage required and triager able to answer question    Negative: Question about upcoming scheduled test, no triage required and triager able to answer question    Negative: [1] Caller is not with the adult (patient) AND [2] probable NON-URGENT symptoms    [1] Follow-up call to recent contact AND [2] information only call, no triage required    Protocols used: [facility]

## 2019-04-29 NOTE — TELEPHONE ENCOUNTER
Reason for Call: Marina calls and says that she has thrush in her mouth and has had this for 3 days. Marina says that Dr. Roberson is very familiar with her thrush problem and wants Dr. Roberson to get her medicine for this, tomorrow. Pt. Says that she can be called at: [phone], if the  Has any questions. RN then left this message in Epic, as requested.  Routed to:  Team Sergo Bradley RN   [facility]  [phone]

## 2019-05-01 ENCOUNTER — TELEPHONE (OUTPATIENT)
Dept: FAMILY MEDICINE | Facility: CLINIC | Age: 69
End: 2019-05-01

## 2019-05-01 NOTE — TELEPHONE ENCOUNTER
Per patient, she thinks that she has oral thrush   Has noted symptoms x 5 days now  She also has urinary frequency but stated that she goes frequently anyway and this is not completely new for her    Advised that she be seen  She will call back to schedule on her own    Harshal Hayward RN

## 2019-05-02 ENCOUNTER — TELEPHONE (OUTPATIENT)
Dept: FAMILY MEDICINE | Facility: CLINIC | Age: 69
End: 2019-05-02

## 2019-05-02 NOTE — TELEPHONE ENCOUNTER
Called Shruthi  She stated that patient was under the impression that Valtrex was discontinued and she has been off of this for a while  Is now having outbreaks (Recurrent genital HSV (herpes simplex virus) infection)  Patient still has a pill bottle from November 2018 that looks almost full  Advised that this was not discontinued and is still active on med list  Patient recalls that her BP was low and so provider discontinued it  Explained that Valsarta-hydrochlorothiazide dose was decreased due to low BP, no changes were made in the Valtrex  Patient may have just mixed up the medications  Shruthi will have patient resume the Valtrex     Harshal Hayward RN

## 2019-05-02 NOTE — TELEPHONE ENCOUNTER
Reason for Call:  Home Health Care    Shruthi with [facility] called regarding (reason for call): [phone]  Calling state patient was taken off of her acyclovir and is now having out breaks. Can she start taking it again or is there something else you would recommend? Please advise  Pt Provider: Da    Phone Number Homecare Nurse can be reached at:   [facility] (HOME CARE) [phone]         Can we leave a detailed message on this number? YES    Phone number patient can be reached at: Home number on file [phone] (home)    Best Time: ASAP home care is with patient now     Call taken on 5/2/2019 at 2:34 PM by Mendy Haro

## 2019-05-06 ENCOUNTER — TELEPHONE (OUTPATIENT)
Dept: FAMILY MEDICINE | Facility: CLINIC | Age: 69
End: 2019-05-06

## 2019-05-06 NOTE — TELEPHONE ENCOUNTER
Reason for call:  Patient reporting a symptom    Symptom or request: Blood sugar fluctuation     Duration (how long have symptoms been present): last few days    Have you been treated for this before? Yes    Additional comments: Patient calling states the last few days her blood sugar has been 191 then it drops to 65. Please advise  Phone Number patient can be reached at:  Home number on file [phone] (home)    Best Time:  ASAP    Can we leave a detailed message on this number:  YES    Call taken on 5/6/2019 at 5:55 PM by Mendy Haro

## 2019-05-06 NOTE — TELEPHONE ENCOUNTER
Per patient, she has noticed that her BG fluctuates  She has been taking her Toujeo at HS but admits that she has been skipping the Humalog for awhile now because she is afraid her BG would drop too low  She does not know exactly how long she has been off the Humalog  Last night at HS, her BG was 79  Her BG around 10:00 AM today was 191  She could not recall if this was a fasting BG or after she had eaten breakfast.  She then napped and woke up with BG at 65  She recalls that she had eaten breakfast already before napping  She did not have any other BG readings  She she not keep a log and does not have the results saved on her glucometer    Advised her that she needs to start keeping a log and writing down the times she checks her BG and whether or not they are fasting numbers  This has been an issue before but patient continues to be noncompliant with BG checks and/or keeping track of her BG  Please advise    Harshal Hayward RN

## 2019-05-07 ENCOUNTER — OFFICE VISIT (OUTPATIENT)
Dept: FAMILY MEDICINE | Facility: CLINIC | Age: 69
End: 2019-05-07
Payer: COMMERCIAL

## 2019-05-07 VITALS
HEART RATE: 100 BPM | TEMPERATURE: 98.2 F | OXYGEN SATURATION: 96 % | RESPIRATION RATE: 18 BRPM | DIASTOLIC BLOOD PRESSURE: 60 MMHG | SYSTOLIC BLOOD PRESSURE: 108 MMHG | BODY MASS INDEX: 50.02 KG/M2 | HEIGHT: 64 IN | WEIGHT: 293 LBS

## 2019-05-07 DIAGNOSIS — R82.90 MALODOROUS URINE: Primary | ICD-10-CM

## 2019-05-07 DIAGNOSIS — R07.0 THROAT PAIN: ICD-10-CM

## 2019-05-07 DIAGNOSIS — H61.23 CERUMINOSIS, BILATERAL: ICD-10-CM

## 2019-05-07 DIAGNOSIS — R35.0 URINE FREQUENCY: ICD-10-CM

## 2019-05-07 LAB
ALBUMIN UR-MCNC: ABNORMAL MG/DL
APPEARANCE UR: ABNORMAL
BILIRUB UR QL STRIP: NEGATIVE
COLOR UR AUTO: YELLOW
DEPRECATED S PYO AG THROAT QL EIA: NORMAL
GLUCOSE UR STRIP-MCNC: NEGATIVE MG/DL
HGB UR QL STRIP: ABNORMAL
KETONES UR STRIP-MCNC: NEGATIVE MG/DL
LEUKOCYTE ESTERASE UR QL STRIP: NEGATIVE
NITRATE UR QL: NEGATIVE
NON-SQ EPI CELLS #/AREA URNS LPF: ABNORMAL /LPF
PH UR STRIP: 5.5 PH (ref 5–7)
RBC #/AREA URNS AUTO: ABNORMAL /HPF
SOURCE: ABNORMAL
SP GR UR STRIP: <=1.005 (ref 1–1.03)
SPECIMEN SOURCE: NORMAL
UROBILINOGEN UR STRIP-ACNC: 0.2 EU/DL (ref 0.2–1)
WBC #/AREA URNS AUTO: ABNORMAL /HPF

## 2019-05-07 PROCEDURE — 99214 OFFICE O/P EST MOD 30 MIN: CPT | Performed by: PHYSICIAN ASSISTANT

## 2019-05-07 PROCEDURE — 87186 SC STD MICRODIL/AGAR DIL: CPT | Performed by: PHYSICIAN ASSISTANT

## 2019-05-07 PROCEDURE — 87081 CULTURE SCREEN ONLY: CPT | Mod: 59 | Performed by: PHYSICIAN ASSISTANT

## 2019-05-07 PROCEDURE — 81001 URINALYSIS AUTO W/SCOPE: CPT | Performed by: PHYSICIAN ASSISTANT

## 2019-05-07 PROCEDURE — 87088 URINE BACTERIA CULTURE: CPT | Mod: 59 | Performed by: PHYSICIAN ASSISTANT

## 2019-05-07 PROCEDURE — 87086 URINE CULTURE/COLONY COUNT: CPT | Performed by: PHYSICIAN ASSISTANT

## 2019-05-07 PROCEDURE — 87880 STREP A ASSAY W/OPTIC: CPT | Performed by: PHYSICIAN ASSISTANT

## 2019-05-07 ASSESSMENT — MIFFLIN-ST. JEOR: SCORE: 2059.07

## 2019-05-07 NOTE — TELEPHONE ENCOUNTER
Called and spoke with patient & gave her information below.   Patient agreed to stop Humalog and states that her glucometer records all of her blood sugars. Advised her to bring this in to her next appointment so readings can be downloaded.   Patient states she has been taking Victoza and Toujeo as directed.   Patient is also agreeable to wearing a continuous glucose monitor & seeing diabetic education.     Should patient decrease Toujeo by 20% now or wait until she see diabetic educators and has been off Humalog?  For diabetes referral, short term CGM or permanent monitor?    Referral chris'd up.     Magda Tay RN

## 2019-05-07 NOTE — PROGRESS NOTES
"  SUBJECTIVE:   Marina Valdez is a 68 year old female who presents to clinic today for the following   health issues:      ENT Symptoms             Symptoms: cc Present Absent Comment   Fever/Chills  x  Fever and chills, no temp taken at home but feels hot   Fatigue  x     Muscle Aches  x     Eye Irritation  x     Sneezing  x     Nasal Candelario/Drg  x     Sinus Pressure/Pain   x    Loss of smell  x     Dental pain  x     Sore Throat  x     Swollen Glands   x    Ear Pain/Fullness  x  Left ear pain   Cough   x    Wheeze   x    Chest Pain   x    Shortness of breath   x    Rash   x    Other   x      Symptom duration:  1 week   Symptom severity:  moderate   Treatments tried:  Flonase and Tylenol   Contacts:  none       URINARY TRACT SYMPTOMS      Duration: 1 week    Description   odor    Intensity:  moderate    Accompanying signs and symptoms:  Fever/chills: YES- fever am chills  Flank pain YES- left side  Nausea and vomiting: YES- Nausea  Vaginal symptoms: none  Abdominal/Pelvic Pain: no     History  History of frequent UTI's: YES  History of kidney stones: no   Sexually Active: no   Possibility of pregnancy: No    Precipitating or alleviating factors: None    Therapies tried and outcome: Tylenol   Outcome: none      Additional history: as documented    Reviewed  and updated as needed this visit by clinical staff  Tobacco  Allergies  Meds  Med Hx  Surg Hx  Fam Hx  Soc Hx          ROS:  Constitutional, HEENT, cardiovascular, pulmonary, gi and gu systems are negative, except as otherwise noted.    OBJECTIVE:     /60   Pulse 100   Temp 98.2  F (36.8  C) (Oral)   Resp 18   Ht 1.626 m (5' 4\")   Wt (!) 153.4 kg (338 lb 3.2 oz)   LMP 03/24/2010   SpO2 96%   BMI 58.05 kg/m    Body mass index is 58.05 kg/m .  GENERAL: healthy, alert and no distress  HENT: normal cephalic/atraumatic, both ears: occluded with wax, nose and mouth without ulcers or lesions, oropharynx clear and oral mucous membranes moist  NECK: " no adenopathy, no asymmetry, masses, or scars and thyroid normal to palpation  RESP: lungs clear to auscultation - no rales, rhonchi or wheezes    Diagnostic Test Results:  none     ASSESSMENT/PLAN:   1. Malodorous urine  - Urine Culture Aerobic Bacterial    2. Ceruminosis, bilateral  - carbamide peroxide (DEBROX) 6.5 % otic solution; Place 5 drops into both ears 2 times daily  Dispense: 15 mL; Refill: 1    3. Urine frequency  - UA reflex to Microscopic and Culture  - Urine Microscopic    4. Throat pain  - Strep, Rapid Screen  - Beta strep group A culture    Follow up on labs  Use medication as directed.  Follow up if symptoms should persist, change or worsen.  Patient amenable to this follow up plan.     Chelsi Gomes PA-C  [facility]

## 2019-05-07 NOTE — TELEPHONE ENCOUNTER
Called and spoke with patient and gave information below.   MAR updated with new dose of Toujeo.   # given to call and schedule with diabetic education & advised to bring glucometer.   Pt verbalized understanding & no further questions.    Magda Tay RN

## 2019-05-07 NOTE — TELEPHONE ENCOUNTER
In a patient with noncompliant behavior, tight control is not possible. As such we look at pros and cons and risk versus benefit analysis.    I think given her purported episodes of low blood pressure, I want her to please stop with humalog completely for now however we need blood glucose readings.    If she can't seem to do this consistently is she a candidate for a continuous glucose monitor  ? Will she come in for diabetes education team ?    Lets chat with her about this ! Her last hemoglobin a1c  [ diabetes test ] showed drastic improvement so her report of low blood glucose reading is likely correct. This is likely all secondary to the Liraglutide [ Victoza ] which I assume she IS taking ?? Probably the Toujeo (insulin glargine U300) should also be decreased by 20% of the current dose.    Lab Results   Component Value Date    A1C 6.8 02/04/2019    A1C 10.0 08/31/2018    A1C 10.4 10/17/2017    A1C 8.4 07/10/2017    A1C 7.1 04/18/2017     If still questions Please huddle with me on this case tomorrow     Mateus Roberson MD

## 2019-05-07 NOTE — TELEPHONE ENCOUNTER
Reduce the Toujeo (insulin glargine U300) now  See certified diabetes educator   Plan is for temporary diagnostic continuous glucose monitor  And this is not a requirement just a suggestion to the diabetes education team     Mateus Roberson MD

## 2019-05-08 ENCOUNTER — TELEPHONE (OUTPATIENT)
Dept: FAMILY MEDICINE | Facility: CLINIC | Age: 69
End: 2019-05-08

## 2019-05-08 DIAGNOSIS — N30.00 ACUTE CYSTITIS WITHOUT HEMATURIA: Primary | ICD-10-CM

## 2019-05-08 LAB
BACTERIA SPEC CULT: NORMAL
SPECIMEN SOURCE: NORMAL

## 2019-05-08 RX ORDER — SULFAMETHOXAZOLE/TRIMETHOPRIM 800-160 MG
1 TABLET ORAL 2 TIMES DAILY
Qty: 6 TABLET | Refills: 0 | Status: SHIPPED | OUTPATIENT
Start: 2019-05-08 | End: 2019-07-12

## 2019-05-08 NOTE — TELEPHONE ENCOUNTER
Called and spoke with patient & notified of provider's results as written.   Verbalizes understanding & no further questions.     Magda Tay RN

## 2019-05-10 LAB
BACTERIA SPEC CULT: ABNORMAL
SPECIMEN SOURCE: ABNORMAL

## 2019-05-15 DIAGNOSIS — B37.2 CANDIDAL INTERTRIGO: ICD-10-CM

## 2019-05-17 RX ORDER — NYSTATIN 100000 [USP'U]/G
POWDER TOPICAL
Qty: 120 G | Refills: 1 | Status: SHIPPED | OUTPATIENT
Start: 2019-05-17 | End: 2019-07-15

## 2019-06-05 ENCOUNTER — NURSE TRIAGE (OUTPATIENT)
Dept: FAMILY MEDICINE | Facility: CLINIC | Age: 69
End: 2019-06-05

## 2019-06-05 DIAGNOSIS — R30.0 DYSURIA: Primary | ICD-10-CM

## 2019-06-05 NOTE — TELEPHONE ENCOUNTER
She doesn't need an office visit but can leave a UA.  This should be done before antibiotics are ordered so cultures can be done if needed.  Mandi BALL

## 2019-06-05 NOTE — TELEPHONE ENCOUNTER
Reason for call:  Patient reporting a symptom    Symptom or request:  uti     Duration (how long have symptoms been present):  3 days    Have you been treated for this before? Yes    Additional comments:  Still has     Phone Number patient can be reached at:  Home number on file [phone] (home)    Best Time:   Any     Can we leave a detailed message on this number:  YES    Call taken on 6/5/2019 at 10:20 AM by Daisy Ramos

## 2019-06-05 NOTE — TELEPHONE ENCOUNTER
Spoke to patient, who states that she has urinary frequency, and burning on urination. She was treated for recent UTI, see LOV note 5/7/19. Finished three day course of sulfamethoxazole-trimethoprim 800-160, and felt better. Symptoms of UTI returned three days ago. States she did not feel that the last round of antibiotics was enough. Patient would like a new prescription for antibiotics, and does not want to come in for an office visit if possible.     Additional Information    Severe pain with urination    Negative: Fever > 100.5 F (38.1 C)    Negative: Side (flank) or lower back pain present    > 2 UTIs in last year    Age > 50 years    Negative: Shock suspected (e.g., cold/pale/clammy skin, too weak to stand, low BP, rapid pulse)    Negative: Sounds like a life-threatening emergency to the triager    Negative: Unable to urinate (or only a few drops) and bladder feels very full    Negative: Vomiting    Negative: Patient sounds very sick or weak to the triager    Negative: Taking antibiotic > 24 hours for UTI and fever persists    Negative: Taking antibiotic > 3 days for UTI and painful urination not improved    Negative: Patient is worried about sexually transmitted disease (STD)    Negative: Possibility of pregnancy    Painful urination AND EITHER frequency or urgency    Negative: All other females with painful urination, or patient wants to be seen     Patient does not want to be seen, would like antibiotic sent without appointment if possible.    Negative: Taking antibiotic < 24 hours for UTI and fever persists    Negative: Taking antibiotic < 3 days for UTI and painful urination not improved    Protocols used: URINATION PAIN - FEMALE-A-OH    Kylie Doyle RN on 6/5/2019 at 10:48 AM

## 2019-06-06 ENCOUNTER — TELEPHONE (OUTPATIENT)
Dept: INTERNAL MEDICINE | Facility: CLINIC | Age: 69
End: 2019-06-06

## 2019-06-06 NOTE — TELEPHONE ENCOUNTER
Detailed message left on patient's VM with note as written below.  Scheduling number given    If patient calls back, please schedule a lab only appointment for urine test      Harshal Hayward RN

## 2019-06-06 NOTE — TELEPHONE ENCOUNTER
LM for patient to reschedule 6-13-19, appointment.  Dr. Roberson will not be in the office this day. Camila Rudolph,

## 2019-06-10 NOTE — TELEPHONE ENCOUNTER
Spoke to patient and informed.  Lab appointment scheduled for Tuesday, June 11th, 1 p.m.    See 6-5-19, phone message regarding lab only/no office visit needed per CM Gonzales. Camila Rudolph,

## 2019-06-14 ENCOUNTER — TELEPHONE (OUTPATIENT)
Dept: FAMILY MEDICINE | Facility: CLINIC | Age: 69
End: 2019-06-14

## 2019-06-14 DIAGNOSIS — R82.90 NONSPECIFIC FINDING ON EXAMINATION OF URINE: Primary | ICD-10-CM

## 2019-06-14 DIAGNOSIS — R30.0 DYSURIA: ICD-10-CM

## 2019-06-14 LAB
ALBUMIN UR-MCNC: ABNORMAL MG/DL
APPEARANCE UR: ABNORMAL
BACTERIA #/AREA URNS HPF: ABNORMAL /HPF
BILIRUB UR QL STRIP: NEGATIVE
COLOR UR AUTO: YELLOW
GLUCOSE UR STRIP-MCNC: NEGATIVE MG/DL
HGB UR QL STRIP: ABNORMAL
KETONES UR STRIP-MCNC: NEGATIVE MG/DL
LEUKOCYTE ESTERASE UR QL STRIP: ABNORMAL
NITRATE UR QL: NEGATIVE
NON-SQ EPI CELLS #/AREA URNS LPF: ABNORMAL /LPF
PH UR STRIP: 6 PH (ref 5–7)
RBC #/AREA URNS AUTO: ABNORMAL /HPF
SOURCE: ABNORMAL
SP GR UR STRIP: <=1.005 (ref 1–1.03)
UROBILINOGEN UR STRIP-ACNC: 0.2 EU/DL (ref 0.2–1)
WBC #/AREA URNS AUTO: ABNORMAL /HPF

## 2019-06-14 PROCEDURE — 81001 URINALYSIS AUTO W/SCOPE: CPT | Performed by: PHYSICIAN ASSISTANT

## 2019-06-14 PROCEDURE — 87086 URINE CULTURE/COLONY COUNT: CPT | Performed by: PHYSICIAN ASSISTANT

## 2019-06-14 PROCEDURE — 87088 URINE BACTERIA CULTURE: CPT | Performed by: PHYSICIAN ASSISTANT

## 2019-06-14 PROCEDURE — 87186 SC STD MICRODIL/AGAR DIL: CPT | Performed by: PHYSICIAN ASSISTANT

## 2019-06-14 NOTE — TELEPHONE ENCOUNTER
Reason for call:  Other   Patient called regarding (reason for call): call back  Additional comments: PER PT called wanting results for urine     Phone number to reach patient:  Cell number on file:    No relevant phone numbers on file.       Best Time:  asap    Can we leave a detailed message on this number?  YES

## 2019-06-16 LAB
BACTERIA SPEC CULT: ABNORMAL
BACTERIA SPEC CULT: ABNORMAL
SPECIMEN SOURCE: ABNORMAL

## 2019-06-17 ENCOUNTER — NURSE TRIAGE (OUTPATIENT)
Dept: NURSING | Facility: CLINIC | Age: 69
End: 2019-06-17

## 2019-06-17 DIAGNOSIS — B96.20 E. COLI UTI: Primary | ICD-10-CM

## 2019-06-17 DIAGNOSIS — B96.20 E. COLI UTI: ICD-10-CM

## 2019-06-17 DIAGNOSIS — N39.0 E. COLI UTI: ICD-10-CM

## 2019-06-17 DIAGNOSIS — N39.0 E. COLI UTI: Primary | ICD-10-CM

## 2019-06-17 RX ORDER — CEPHALEXIN 500 MG/1
500 CAPSULE ORAL 2 TIMES DAILY
Qty: 14 CAPSULE | Refills: 0 | Status: SHIPPED | OUTPATIENT
Start: 2019-06-17 | End: 2019-06-17

## 2019-06-17 RX ORDER — CEPHALEXIN 500 MG/1
500 CAPSULE ORAL 2 TIMES DAILY
Qty: 14 CAPSULE | Refills: 0 | Status: SHIPPED | OUTPATIENT
Start: 2019-06-17 | End: 2019-07-12

## 2019-06-18 NOTE — TELEPHONE ENCOUNTER
"Caller thinks she has UTI; symptomatic and  ow having chills   Had a urinalysis done and culture done on 06/14 with abnormal results; had requested call back from clinic but has not gotten any response;   On call provider for [facility] clinic paged via   @ 9:04 PM to call FNA  Call back from Dr. Govea and will prescribe antibiotic   Contacted Marina's sister Evon who will go get medication and help her start it tonight; will also get thermometer and   assess Marina, stating \" I'll take her to the ED if she looks really sick\"   Catalina Steven RN  FNA          Reason for Disposition    [1] Follow-up call to recent contact AND [2] information only call, no triage required    Protocols used: INFORMATION ONLY CALL-A-AH      "

## 2019-06-18 NOTE — TELEPHONE ENCOUNTER
Prescribing cephalexin given cefazolin-sensitive E. coli on urine culture and symptomatic as described.  Dosing based on eGFR from earlier this year approximately 30-40.  Catalina MARTIN to call/e-prescribe for me to pharmacy for starting tonight -- OK for verbal order if needed at 500 mg twice daily x 7 days with patient to follow-up with PCP later this week at very least for phone check in.

## 2019-06-18 NOTE — TELEPHONE ENCOUNTER
See nurse triage call, patient was dosed by on call MD on 6/17/19.  Keflex was ordered.   Laya Krause RN

## 2019-06-19 ENCOUNTER — NURSE TRIAGE (OUTPATIENT)
Dept: FAMILY MEDICINE | Facility: CLINIC | Age: 69
End: 2019-06-19

## 2019-06-19 DIAGNOSIS — B37.0 THRUSH: Primary | ICD-10-CM

## 2019-06-19 RX ORDER — NYSTATIN 100000/ML
500000 SUSPENSION, ORAL (FINAL DOSE FORM) ORAL 4 TIMES DAILY
Qty: 100 ML | Refills: 0 | Status: SHIPPED | OUTPATIENT
Start: 2019-06-19 | End: 2019-07-12

## 2019-06-19 NOTE — TELEPHONE ENCOUNTER
Reason for call:  Patient reporting a symptom    Symptom or request: Patient calling thinks she has thrush in her mouth    Duration (how long have symptoms been present): recent    Have you been treated for this before? No    Additional comments: Patient calling thinks she has thrush in her mouth due to medications she is taking for a UTI. Could a prescription please be called [place] [facility] [address] [facility] [place] [id number] for the thrush? Please advise    Phone Number patient can be reached at:  Home number on file [phone] (home)    Best Time:  ASAP    Can we leave a detailed message on this number:  YES    Call taken on 6/19/2019 at 9:18 AM by Mendy Haro

## 2019-06-19 NOTE — TELEPHONE ENCOUNTER
Spoke with pt. States her tongue burns and feels chapped like. Tongue is not swollen. Asked if she has any white patches and she said she can't tell. Symptoms started yesterday. Has taken pills for a bad bladder infection and yesterday started to get thrush in her mouth. Has had thrush before and this is the same symptom she has had before. No difficulty breathing. Pt is requesting a prescription to treat thrush. Please advise.    Stacey Alexander RN  [facility]    Additional Information    Negative: Severe difficulty breathing (e.g., struggling for each breath, speaks in single words, stridor)    Negative: Sounds like a life-threatening emergency to the triager    Negative: Injury to tooth or teeth    Negative: Injury to mouth    Negative: Canker sore suspected (i.e., aphthous ulcer) in the mouth    Negative: Cold sore suspected (i.e., fever blister sore) on the outer lip    Negative: Tooth is painful or swelling around a tooth    Negative: Mouth is painful    Negative: Throat is painful    Negative: Tongue swelling    Negative: Lip swelling    Negative: [1] Drooling or spitting out saliva (because can't swallow) AND [2] new onset    Negative: [1] Bleeding from mouth AND [2] won't stop after 10 minutes of direct pressure    Negative: Electrical burn of mouth    Negative: Chemical burn of mouth    Negative: [1] Difficulty breathing AND [2] not severe    Negative: Patient sounds very sick or weak to the triager    Negative: Receiving chemotherapy or radiation therapy    Protocols used: MOUTH SYMPTOMS-A-AH

## 2019-06-23 DIAGNOSIS — Z01.00 DIABETIC EYE EXAM (H): ICD-10-CM

## 2019-06-23 DIAGNOSIS — E11.9 DIABETIC EYE EXAM (H): ICD-10-CM

## 2019-06-23 DIAGNOSIS — Z12.11 SPECIAL SCREENING FOR MALIGNANT NEOPLASMS, COLON: Primary | ICD-10-CM

## 2019-06-23 DIAGNOSIS — Z12.31 ENCOUNTER FOR SCREENING MAMMOGRAM FOR BREAST CANCER: ICD-10-CM

## 2019-06-24 ENCOUNTER — TELEPHONE (OUTPATIENT)
Dept: FAMILY MEDICINE | Facility: CLINIC | Age: 69
End: 2019-06-24

## 2019-06-24 NOTE — TELEPHONE ENCOUNTER
----- Message from Chelsi Gomes PA-C sent at 6/23/2019 11:16 AM CDT -----  Please let Patient know mammogram and colonoscopy orders placed.  No studies on record for several years.  Thank you.

## 2019-06-25 RX ORDER — LIRAGLUTIDE 6 MG/ML
1.8 INJECTION SUBCUTANEOUS DAILY
Qty: 27 ML | Refills: 0 | Status: SHIPPED | OUTPATIENT
Start: 2019-06-25 | End: 2019-09-24

## 2019-06-25 NOTE — TELEPHONE ENCOUNTER
"Routing refill request to provider for review/approval because:  Labs out of range:      Requested Prescriptions   Pending Prescriptions Disp Refills     liraglutide (VICTOZA PEN) 18 MG/3ML solution [Pharmacy Med Name: VICTOZA 3-HUDSON 18 MG/3 ML PEN]  Last Written Prescription Date:  3/20/19  Last Fill Quantity: 9mL,  # refills: 2   Last office visit: 5/7/2019 with prescribing provider:     Future Office Visit:    2     Sig: INJECT 1.8 MG UNDER THE SKIN DAILY       GLP-1 Agonists Protocol Failed - 6/24/2019  6:39 AM        Failed - Normal serum creatinine on file in past 12 months     Recent Labs   Lab Test 02/04/19  1255   CR 1.58*             Passed - Blood pressure less than 140/90 in past 6 months     BP Readings from Last 3 Encounters:   05/07/19 108/60   04/01/19 132/84   03/18/19 132/84                 Passed - LDL on file in past 12 months     Recent Labs   Lab Test 02/04/19  1255   LDL 26             Passed - Microalbumin on file in past 12 months     Recent Labs   Lab Test 03/18/19  1452   MICROL 282   UMALCR 243.10*             Passed - HgbA1C in past 3 or 6 months     If HgbA1C is 8 or greater, it needs to be on file within the past 3 months.  If less than 8, must be on file within the past 6 months.     Recent Labs   Lab Test 02/04/19  1255   A1C 6.8*             Passed - Medication is active on med list        Passed - Patient is age 18 or older        Passed - No active pregnancy on record        Passed - No positive pregnancy test in past 12 months        Passed - Recent (6 mo) or future (30 days) visit within the authorizing provider's specialty     Patient had office visit in the last 6 months or has a visit in the next 30 days with authorizing provider.  See \"Patient Info\" tab in inbasket, or \"Choose Columns\" in Meds & Orders section of the refill encounter.            Nadia Calderon RN - BC      "

## 2019-06-25 NOTE — TELEPHONE ENCOUNTER
Left VM for patient that orders are placed and to return call with any questions  José Luis Selby CMA on 6/25/2019 at 7:42 AM

## 2019-06-27 ENCOUNTER — TELEPHONE (OUTPATIENT)
Dept: FAMILY MEDICINE | Facility: CLINIC | Age: 69
End: 2019-06-27

## 2019-06-27 DIAGNOSIS — N39.0 FREQUENT UTI: ICD-10-CM

## 2019-06-27 DIAGNOSIS — R31.9 BLOOD IN URINE: Primary | ICD-10-CM

## 2019-06-27 NOTE — TELEPHONE ENCOUNTER
Patient was initially seen for UTI on 5/8/19 and was given prescription for Bactrim DS/Septra 800-160 BID for 3 days.  Patient then called on 6/5/19 with same symptoms- she gave another urine sample on 6/14/19 and was given prescription for Cephalexin 500mg BID for 7 days.  She completed the prescription 2 days ago and symptoms returned right after completing antibiotics.  She denies pain but does have some blood in her urine.  She states she has a stomach bug and has also had diarrhea and vomiting since yesterday- hasnt eaten anything since yesterday but is staying hydrated.   Please advise   Laya Krause RN

## 2019-06-27 NOTE — TELEPHONE ENCOUNTER
Buffy calling regarding the same symptoms that the patient called and left a message about. Please call her.

## 2019-06-27 NOTE — TELEPHONE ENCOUNTER
Gave message below to home care nurse, she will inform patient.  She is wondering if a urology referral would be beneficial because patient has had multiple positive UTI's in the last 6 months and has been on multiple different antibiotics.    Referral pending please advise   Laya Krause RN

## 2019-06-27 NOTE — TELEPHONE ENCOUNTER
Reason for call:  Symptom   Symptom or request: UTI    Duration (how long have symptoms been present): 7 days  Have you been treated for this before? Yes    Additional comments: Patient is done with her prescription and says she still has a bladder infection, please call    Phone number to reach patient:  Home number on file [phone] (home)    Best Time:  any    Can we leave a detailed message on this number?  YES

## 2019-06-28 ENCOUNTER — TELEPHONE (OUTPATIENT)
Dept: FAMILY MEDICINE | Facility: CLINIC | Age: 69
End: 2019-06-28

## 2019-06-28 NOTE — TELEPHONE ENCOUNTER
Last OV was 5/7/19 with Chelsi Gomes PA-C and treated for UTI then with sulfamethoxazole-trimethoprim 800-160  Was retested 6/14/19 due to return of symptoms and treated for UTI with Keflex  Per patient, she finished her abx for UTI on 6/25/19 and thought she was better for a day but noticed cloudy, blood tinged urine again  Feels chills but denies any dysuria    Please advise    Harshal Hayward RN

## 2019-06-28 NOTE — TELEPHONE ENCOUNTER
Noted patient has an appointment with Dr. Strickland for 7/2/19 for these symptoms as well as vomiting and diarrhea   Spoke with patient   Vomiting and diarrhea have improved  Main concern is the continued urinary symptoms below  She is unable to come in today and we have no openings for Monday 7/1/19  Offered to check another clinic for a sooner appointment but she declined    Offered urgent care and she stated she will go to [facility] tomorrow  She would like to keep her appointment for 7/2/19 just in case it is needed    Harshal Hayward RN

## 2019-06-28 NOTE — TELEPHONE ENCOUNTER
Reason for call:  Symptom   Symptom or request: Bladder infection    Duration (how long have symptoms been present): na  Have you been treated for this before? Yes    Additional comments: Patient had been treated for a bladder infection recently and just finished up medication, she thinks the infection has returned and would like to be put back on meds. Please call back and advise.     Phone number to reach patient:  Home number on file [phone] (home)    Best Time:  any    Can we leave a detailed message on this number?  YES

## 2019-06-29 ENCOUNTER — NURSE TRIAGE (OUTPATIENT)
Dept: NURSING | Facility: CLINIC | Age: 69
End: 2019-06-29

## 2019-06-30 NOTE — TELEPHONE ENCOUNTER
"Pt states her urine is red. Says this has been present \"for months\". Says she wants on-call doctor to prescribe something for this. Advised pt she will need to be seen for this. Pt said she cannot go anywhere to be seen because she is too weak to do so. Told pt if she is extremely weak we recshe call 911. Pt said \"goodbye\" and hung up.     Reason for Disposition    Shock suspected (e.g., cold/pale/clammy skin, too weak to stand, low BP, rapid pulse)    Protocols used: URINE - BLOOD IN-A-AH      "

## 2019-07-02 ENCOUNTER — OFFICE VISIT (OUTPATIENT)
Dept: FAMILY MEDICINE | Facility: CLINIC | Age: 69
End: 2019-07-02
Payer: COMMERCIAL

## 2019-07-02 VITALS
DIASTOLIC BLOOD PRESSURE: 60 MMHG | WEIGHT: 293 LBS | HEART RATE: 120 BPM | OXYGEN SATURATION: 98 % | BODY MASS INDEX: 50.02 KG/M2 | RESPIRATION RATE: 18 BRPM | TEMPERATURE: 98.4 F | HEIGHT: 64 IN | SYSTOLIC BLOOD PRESSURE: 110 MMHG

## 2019-07-02 DIAGNOSIS — N39.490 OVERFLOW INCONTINENCE: ICD-10-CM

## 2019-07-02 DIAGNOSIS — R31.29 OTHER MICROSCOPIC HEMATURIA: ICD-10-CM

## 2019-07-02 DIAGNOSIS — R82.90 NONSPECIFIC FINDING ON EXAMINATION OF URINE: ICD-10-CM

## 2019-07-02 DIAGNOSIS — L30.4 INTERTRIGO: ICD-10-CM

## 2019-07-02 DIAGNOSIS — N39.0 RECURRENT UTI: Primary | ICD-10-CM

## 2019-07-02 LAB
ALBUMIN UR-MCNC: ABNORMAL MG/DL
APPEARANCE UR: ABNORMAL
BACTERIA #/AREA URNS HPF: ABNORMAL /HPF
BILIRUB UR QL STRIP: NEGATIVE
COLOR UR AUTO: YELLOW
GLUCOSE UR STRIP-MCNC: 100 MG/DL
HGB UR QL STRIP: ABNORMAL
KETONES UR STRIP-MCNC: NEGATIVE MG/DL
LEUKOCYTE ESTERASE UR QL STRIP: ABNORMAL
NITRATE UR QL: POSITIVE
NON-SQ EPI CELLS #/AREA URNS LPF: ABNORMAL /LPF
PH UR STRIP: 5.5 PH (ref 5–7)
RBC #/AREA URNS AUTO: ABNORMAL /HPF
SOURCE: ABNORMAL
SP GR UR STRIP: <=1.005 (ref 1–1.03)
UROBILINOGEN UR STRIP-ACNC: 0.2 EU/DL (ref 0.2–1)
WBC #/AREA URNS AUTO: ABNORMAL /HPF

## 2019-07-02 PROCEDURE — 81001 URINALYSIS AUTO W/SCOPE: CPT | Performed by: FAMILY MEDICINE

## 2019-07-02 PROCEDURE — 87186 SC STD MICRODIL/AGAR DIL: CPT | Performed by: FAMILY MEDICINE

## 2019-07-02 PROCEDURE — 99214 OFFICE O/P EST MOD 30 MIN: CPT | Performed by: FAMILY MEDICINE

## 2019-07-02 PROCEDURE — 87088 URINE BACTERIA CULTURE: CPT | Performed by: FAMILY MEDICINE

## 2019-07-02 PROCEDURE — 87086 URINE CULTURE/COLONY COUNT: CPT | Performed by: FAMILY MEDICINE

## 2019-07-02 ASSESSMENT — MIFFLIN-ST. JEOR: SCORE: 2003.73

## 2019-07-02 NOTE — PROGRESS NOTES
Subjective     Marina Valdez is a 68 year old female who presents to clinic today for the following health issues:    HPI   URINARY TRACT SYMPTOMS      Duration: 3-4 days ago    Description  frequency, hematuria and odor    Intensity:  severe    Accompanying signs and symptoms:  Fever/chills: YES  Flank pain no   Nausea and vomiting: YES  Vaginal symptoms: none  Abdominal/Pelvic Pain: no     History  History of frequent UTI's: YES  History of kidney stones: no   Sexually Active: no   Possibility of pregnancy: No    Precipitating or alleviating factors: wears diapers that may contribute to UTI    Therapies tried and outcome: course of antibiotics - Keflex   Outcome: some relief/ would like another RX    Been having recurrent UTI since 12/2018.  Having blood I her urine; when taking antibiotic the blood in urine returns.  No dysuria, abdominal pains. Gets chills at night.  Finished antibiotic 4 days ago.  Bladder control: Leaks when has urge and wears diapers  Sometimes urinates just a little urine and has frequency    Patient Active Problem List   Diagnosis     Hypertension goal BP (blood pressure) < 140/90     Recurrent genital HSV (herpes simplex virus) infection     Intertrigo     Ovarian Mass s/p excision in 2008     Sebaceous cyst     Microalbuminuria     Hyperlipidemia with target LDL less than 100     Chest pain at rest x1 episode- resolved at rest 3 hours, assoc with food     Obstructive sleep apnea (on cpap)     24 hour contact given to patient      Hydradenitis     CKD (chronic kidney disease) stage 3, GFR 30-59 ml/min (H)     Compulsive skin picking     Need for prophylactic vaccination and inoculation against influenza     High risk OTC medication or supplement use     Rotator cuff tear     Morbid obesity due to excess calories (H)     Chronic bilateral low back pain without sciatica     Primary osteoarthritis of both hips     Schizophrenia, unspecified type (H)     Uncontrolled type 2 diabetes  "mellitus with stage 3 chronic kidney disease, with long-term current use of insulin (H)     Endometrial cancer, grade I (H)     Impetigo     Health Care Home     Pruritus ani     Urinary incontinence     Past Surgical History:   Procedure Laterality Date     cataract      bilateral     CYSTOSCOPY N/A 2016    Procedure: CYSTOSCOPY;  Surgeon: Franca Rousseau MD;  Location: [facility] OR     DAVINCI HYSTERECTOMY TOTAL, BILATERAL SALPINGO-OOPHORECTOMY, COMBINED N/A 2016    Procedure: COMBINED DAVINCI HYSTERECTOMY TOTAL, SALPINGO-OOPHORECTOMY;  Surgeon: Franca Rousseau MD;  Location: [facility] OR     LAPAROSCOPY      for endometriosis     OOPHORECTOMY      right     WRIST SURGERY      right       Social History     Tobacco Use     Smoking status: Former Smoker     Packs/day: 1.00     Years: 0.00     Pack years: 0.00     Types: Cigarettes     Last attempt to quit: 1990     Years since quittin.5     Smokeless tobacco: Never Used     Tobacco comment: quit    Substance Use Topics     Alcohol use: No     Family History   Problem Relation Age of Onset     Heart Disease Mother      Diabetes Mother      Hypertension Mother      Psychotic Disorder Mother         schitzophrenia     Hypertension Father      Blood Disease Father         high iron level     Heart Disease Maternal Grandmother      Cerebrovascular Disease Maternal Grandmother      Heart Disease Maternal Grandfather      Breast Cancer Paternal Grandmother      Breast Cancer Sister      Heart Disease Brother      Ovarian Cancer Paternal Aunt      Kidney Cancer Daughter      Uterine Cancer Daughter      Uterine Cancer Sister            Reviewed and updated as needed this visit by Provider         Review of Systems    HEENT, cardiovascular, pulmonary and gi systems are negative, except as otherwise noted.      Objective    /60   Pulse 120   Temp 98.4  F (36.9  C) (Oral)   Resp 18   Ht 1.626 m (5' 4\")   Wt 147.9 kg (326 lb)   LMP " 03/24/2010   SpO2 98%   BMI 55.96 kg/m    Body mass index is 55.96 kg/m .  Physical Exam   GENERAL: frail looking, alert and no distress  NECK: no adenopathy, and thyroid normal to palpation  RESP: lungs clear to auscultation - no rales, rhonchi or wheezes  CV: regular rate and rhythm, normal S1 S2, no S3 or S4, no murmur, click or rub  ABDOMEN: soft, nontender,  no masses and bowel sounds normal  MS: no gross musculoskeletal defects noted, no edema    Diagnostic Test Results:  Results for orders placed or performed in visit on 07/02/19   *UA reflex to Microscopic and Culture ([place] and [facility] (except [place] and [place])   Result Value Ref Range    Color Urine Yellow     Appearance Urine Cloudy     Glucose Urine 100 (A) NEG^Negative mg/dL    Bilirubin Urine Negative NEG^Negative    Ketones Urine Negative NEG^Negative mg/dL    Specific Gravity Urine <=1.005 1.003 - 1.035    Blood Urine Large (A) NEG^Negative    pH Urine 5.5 5.0 - 7.0 pH    Protein Albumin Urine Trace (A) NEG^Negative mg/dL    Urobilinogen Urine 0.2 0.2 - 1.0 EU/dL    Nitrite Urine Positive (A) NEG^Negative    Leukocyte Esterase Urine Large (A) NEG^Negative    Source Midstream Urine    Urine Microscopic   Result Value Ref Range    WBC Urine  (A) OTO5^0 - 5 /HPF    RBC Urine 25-50 (A) OTO2^O - 2 /HPF    Squamous Epithelial /LPF Urine Few FEW^Few /LPF    Bacteria Urine Many (A) NEG^Negative /HPF   Urine Culture Aerobic Bacterial   Result Value Ref Range    Specimen Description Midstream Urine     Culture Micro Culture in progress      Assessment & Plan     Marina was seen today for uti and cough.    Diagnoses and all orders for this visit:    Recurrent UTI      Risk factors include incontinence, diaper use and physical deconditioning.   Just finished a course of antibiotic. UA shows WBC, will await culture and sensitivities before prescription.  -     UROLOGY ADULT REFERRAL    Overflow incontinence    Likely from her history,  "discussed evaluation by urology  -     UROLOGY ADULT REFERRAL    Nonspecific finding on examination of urine  -     Urine Culture Aerobic Bacterial    Intertrigo: groins        Applying medication as needed  -     UROLOGY ADULT REFERRAL    Other microscopic hematuria  -     UROLOGY ADULT REFERRAL    Other orders  -     *UA reflex to Microscopic and Culture ([place] and [facility] (except [place] and [place])  -     Urine Microscopic    BMI:   Estimated body mass index is 55.96 kg/m  as calculated from the following:    Height as of this encounter: 1.626 m (5' 4\").    Weight as of this encounter: 147.9 kg (326 lb).   Weight management plan: Discussed healthy diet and exercise guidelines      Hmuberto Strickland MD  [facility]    "

## 2019-07-05 ENCOUNTER — TELEPHONE (OUTPATIENT)
Dept: FAMILY MEDICINE | Facility: CLINIC | Age: 69
End: 2019-07-05

## 2019-07-05 LAB
BACTERIA SPEC CULT: ABNORMAL
BACTERIA SPEC CULT: ABNORMAL
SPECIMEN SOURCE: ABNORMAL

## 2019-07-05 RX ORDER — NITROFURANTOIN 25; 75 MG/1; MG/1
100 CAPSULE ORAL 2 TIMES DAILY
Qty: 14 CAPSULE | Refills: 0 | Status: SHIPPED | OUTPATIENT
Start: 2019-07-05 | End: 2019-07-12

## 2019-07-05 NOTE — TELEPHONE ENCOUNTER
Patient's daughter returned call to RN Hotline. Consent to communicate on file.   Notified of provider's results as written.   Verbalizes understanding & no further questions.     Magda Tay RN

## 2019-07-05 NOTE — TELEPHONE ENCOUNTER
Notes recorded by Magda Tay RN on 7/5/2019 at 3:53 PM CDT  Left message for patient to call RN hotline [phone].   See TE.     Magda Tay RN  ------    Notes recorded by Humberto Strickland MD on 7/5/2019 at 3:03 PM CDT  Please call patient and let her know her urine grew E coli, taken Keflex.  With urine culture results will recommend Nitrofurantoin for 7 days and prescription sent to the pharmacy    Magda Tay RN

## 2019-07-08 ENCOUNTER — PATIENT OUTREACH (OUTPATIENT)
Dept: GERIATRIC MEDICINE | Facility: CLINIC | Age: 69
End: 2019-07-08

## 2019-07-08 NOTE — PROGRESS NOTES
[facility] Care Coordination Contact  CC received notification of Emergency Room visit.  ER visit occurred on 7/7/19 at [facility] with Dx of Left-sided epistaxis.    CC contacted member and reviewed discharge summary.  Member has a follow-up appointment with PCP: No: Offered Assistance with setting up a follow up appointment but mbr declined and stated she will do this on her own.    Member has had a change in condition: No  New referrals placed: No  Home Visit Needed: No  Care plan reviewed and updated.  PCP notified of ED visit via EMR.    REGINA Grier  [facility]  [phone]

## 2019-07-12 ENCOUNTER — TELEPHONE (OUTPATIENT)
Dept: INTERNAL MEDICINE | Facility: CLINIC | Age: 69
End: 2019-07-12

## 2019-07-12 DIAGNOSIS — Z53.9 DIAGNOSIS NOT YET DEFINED: Primary | ICD-10-CM

## 2019-07-12 DIAGNOSIS — B37.2 CANDIDAL INTERTRIGO: ICD-10-CM

## 2019-07-12 DIAGNOSIS — L30.4 INTERTRIGO: ICD-10-CM

## 2019-07-12 PROCEDURE — G0179 MD RECERTIFICATION HHA PT: HCPCS | Performed by: INTERNAL MEDICINE

## 2019-07-12 RX ORDER — ACETAMINOPHEN 500 MG
500-1000 TABLET ORAL 3 TIMES DAILY PRN
COMMUNITY
Start: 2019-07-12

## 2019-07-12 RX ORDER — DIPHENHYDRAMINE HCL 25 MG
25-50 TABLET ORAL
COMMUNITY
Start: 2019-07-12

## 2019-07-12 NOTE — TELEPHONE ENCOUNTER
Reason for Call:  Form, our goal is to have forms completed with 72 hours, however, some forms may require a visit or additional information.    Type of letter, form or note:  Home Health Certification    Who is the form from?: Home care ([facility])    Where did the form come from: form was faxed in    What clinic location was the form placed at?: [facility]    Where the form was placed: Given to MA/RN    What number is listed as a contact on the form?: [phone]       Additional comments:     Call taken on 7/12/2019 at 11:15 AM by Camila Rudolph

## 2019-07-12 NOTE — TELEPHONE ENCOUNTER
Reviewed [facility] med list and reconciled against Epic med list - my notes are in mold and blue    Mateus Roberson MD      Discrepancies noted-  1. We have Hydrocortisone 0.2% ointment applied to affected area TID x 14 days- [facility] does not have listed- discontinued from Epic? Yes please discontinue     2. We have Toujeo 80 units at HS- [facility] has 120 units at HS- dose adjusted in 5/6/19 phone encounter and patient was supposed to f/u with DM ed but she has not scheduled and declined to schedule when they called because she was currently sick. Patient has history of noncompliant behaviors and we have had trouble dosing her and getting consistent BG results- how should we proceed? Change her dose to 120 units at bedtime but recommend we receive home blood glucose monitoring , a fasting blood glucose first thing in the morning and a two hour post prandial [ 2 hours after eating ] supper    3. [facility] has Humalog 15 units TID with meals- this was discontinued during 5/6/19 phone encounter as well (see info from #2 above)- how should we proceed? Start the Humalog (insulin lispro) back up just as it is listed    4. We have Nystatin BID until rash resolved- [facility] has daily- change to BID PRN on [facility] and [facility] med lists? Change to 2 times a day as needed / until area has cleared    5. We have Senna-docusate 8.6-50mg, take 2 tabs BID PRN- [facility] does not have listed- should this be continued or discontinued? Lets discontinue for now    6. [facility] has psyllium husk fiber 0.52 gm cap, 1 daily- Epic does not have listed- should this be continued or discontinued? List this in epic medication list     7. We have Tramadol 50mg BID listed but reported not using 7/2/19- [facility] does not- discontinued from Epic? Yes discontinue     8. We have Geodon 80 mg, take 160 mg in early AM, 80mg at 10 AM. Take 80mg at HS PRN- [facility] does not have the HS PRN dose listed- psychiatry to clarify? Yes psychiatric to clarify    9. Should Norco be discontinued off both lists?  Treatment completed for acute pain?    Yes discontinue     Mateus Roberson MD

## 2019-07-12 NOTE — TELEPHONE ENCOUNTER
Reviewed [facility] med list and reconciled against Epic med list    Discrepancies noted-  1. We have Hydrocortisone 0.2% ointment applied to affected area TID x 14 days- [facility] does not have listed- discontinued from Epic?  2. We have Toujeo 80 units at HS- [facility] has 120 units at HS- dose adjusted in 5/6/19 phone encounter and patient was supposed to f/u with DM ed but she has not scheduled and declined to schedule when they called because she was currently sick. Patient has history of noncompliant behaviors and we have had trouble dosing her and getting consistent BG results- how should we proceed?  3. [facility] has Humalog 15 units TID with meals- this was discontinued during 5/6/19 phone encounter as well (see info from #2 above)- how should we proceed?  4. We have Nystatin BID until rash resolved- [facility] has daily- change to BID PRN on [facility] and [facility] med lists?  5. We have Senna-docusate 8.6-50mg, take 2 tabs BID PRN- [facility] does not have listed- should this be continued or discontinued?  6. [facility] has psyllium husk fiber 0.52 gm cap, 1 daily- Epic does not have listed- should this be continued or discontinued?  7. We have Tramadol 50mg BID listed but reported not using 7/2/19- [facility] does not- discontinued from Epic?  8. We have Geodon 80 mg, take 160 mg in early AM, 80mg at 10 AM. Take 80mg at HS PRN- [facility] does not have the HS PRN dose listed- psychiatry to clarify?  9. Should Norco be discontinued off both lists? Treatment completed for acute pain?    Routing to provider-  Please review Plan of Care AND med list, make any changes appropriate, and sign plan of care to be faxed back to [facility]    Harshal Hayward RN

## 2019-07-15 ENCOUNTER — TELEPHONE (OUTPATIENT)
Dept: FAMILY MEDICINE | Facility: CLINIC | Age: 69
End: 2019-07-15

## 2019-07-15 DIAGNOSIS — N39.0 RECURRENT UTI: ICD-10-CM

## 2019-07-15 RX ORDER — NYSTATIN 100000 U/G
CREAM TOPICAL
Qty: 45 G | Refills: 1 | COMMUNITY
Start: 2019-07-15 | End: 2019-07-15

## 2019-07-15 RX ORDER — NITROFURANTOIN 25; 75 MG/1; MG/1
100 CAPSULE ORAL 2 TIMES DAILY
Qty: 14 CAPSULE | Refills: 0 | Status: SHIPPED | OUTPATIENT
Start: 2019-07-15

## 2019-07-15 RX ORDER — NYSTATIN 100000 [USP'U]/G
POWDER TOPICAL
Qty: 120 G | Refills: 1 | COMMUNITY
Start: 2019-07-15

## 2019-07-15 NOTE — TELEPHONE ENCOUNTER
Called and spoke with patient and gave information. Rx sent to pharmacy.   Verbalized understanding & no further questions.    Magda Tay RN

## 2019-07-15 NOTE — TELEPHONE ENCOUNTER
Reason for call:  Other   Patient called regarding (reason for call): call back  Additional comments: Patient is calling because she finished her medication and say she still has bladder infection, please call back    Phone number to reach patient:  Home number on file [phone] (home)    Best Time:  any    Can we leave a detailed message on this number?  YES

## 2019-07-15 NOTE — TELEPHONE ENCOUNTER
Buffy, patient's nurse returned call to RN Hotline.   She is unsure who is prescribing the ziprasidone (Geodon) as she only has Dr. Roberson listed on patient's chart for providers. Buffy called back and stated patient sees psychiatrist Dr. Morrison? Unsure of spelling and thinks he's located at [facility].  SIG for Geodon is 80 mg capsules: Take 160 mg at 8 AM, take 80 mg at 10 AM and can take 80 mg at bedtime as needed but as not been taking the nighttime dose.    States she will call patient and daughter who helps check her BG levels and will have them check fasting levels and 2 hour post prandial levels and call back with information on Thursday (7/18) when she sees patient.     Reports that most recent fasting was on 7/11 and BG was 114, and 7 day average was 177. Advised her fasting and post prandial are still needed.     Advised on Toujeo dose increase and restarting Humalog. She will notify patient & daughter.     Nurse wants to clarify that patient should still be taking Victoza along with Toujeo and Humalog.     Magda Tay RN

## 2019-07-15 NOTE — TELEPHONE ENCOUNTER
Med list updated.   Called [facility] home health agency and spoke with Niki. Linda is no longer with the agency. Asked that a nurse call back. Need to find out who her psychiatrist is so we can clarify dosing for Geodon. Also need to inform nurse provider's message regarding BG: recommend we receive home blood glucose monitoring , a fasting blood glucose first thing in the morning and a two hour post prandial [ 2 hours after eating ] supper. Niki will have director of nursing, Freda, or nurse working with pt call back to the RN hotline.    Stacey Alexander RN  [facility]

## 2019-07-15 NOTE — TELEPHONE ENCOUNTER
Lets extend the prescription for a second course. After this if patient still has symptoms we will need to see her    Mateus Roberson MD

## 2019-07-16 RX ORDER — ZIPRASIDONE HYDROCHLORIDE 80 MG/1
CAPSULE ORAL
COMMUNITY
Start: 2019-07-16

## 2019-07-16 NOTE — TELEPHONE ENCOUNTER
Spoke with Buffy. Gave her provider's message as written. Awaiting call back with BG readings.    Home health certification form placed on provider's desk for signature.    Stacey Alexander RN  [facility]

## 2019-07-17 ENCOUNTER — TELEPHONE (OUTPATIENT)
Dept: INTERNAL MEDICINE | Facility: CLINIC | Age: 69
End: 2019-07-17

## 2019-07-17 DIAGNOSIS — I10 HYPERTENSION GOAL BP (BLOOD PRESSURE) < 140/90: ICD-10-CM

## 2019-07-17 NOTE — TELEPHONE ENCOUNTER
Per Buffy, HC nurse, she is calling back to update provider on BG readings as requested (see 7/12/19 phone encounter for more info)  Patient is now taking Humalog 15 units TID with meals, Victoza 1.8mg daily, and Toujeo 120 units at HS  FYI she continues on abx for recurrent UTI    BG's tooday 7/1719  FBG 7:18   9:50     BG's 7/16/19   FBG 5:58 AM 99  9:00   8:26     7 day average 121  14 day averay 145  30 day average 144  90 day average 143    Majority of BG readings are 100-130 range with a couple being higher  The highest reading in the past couple of weeks was 230 on 7/7/19 at 10:26 PM (likely after eating a snack)    She also wants to report BP of 88/52, 89/47  Patient continues on valsartan-hydrochlorothiazide (DIOVAN HCT) 80-12.5 MG tablet daily (still has 160-25mg tab that she is cutting in half. Has new prescription at the pharmacy for the 80-12.5mg strength)  Per Buffy, BP is usually 115/60's, every now and then they get low readings   Just an FYI unless provider wants to make any changes to BP meds  Please advise on any changes to BG meds     Harshal Hayward RN

## 2019-07-18 RX ORDER — VALSARTAN 80 MG/1
80 TABLET ORAL DAILY
Qty: 90 TABLET | Refills: 0 | Status: SHIPPED | OUTPATIENT
Start: 2019-07-18

## 2019-07-18 NOTE — TELEPHONE ENCOUNTER
Buffy, [facility] nurse, updated  Prescription for Valsartan sent.  Med list updated    Harshal Hayward RN

## 2019-07-18 NOTE — TELEPHONE ENCOUNTER
1. Leave anti-diabetic medications without change     2. Lets change her prescription for blood pressure to only the valsartan [ Diovan ] without the hydrochlorothiazide. 80 milligram     Mateus Roberson MD

## 2019-08-01 ENCOUNTER — TELEPHONE (OUTPATIENT)
Dept: FAMILY MEDICINE | Facility: CLINIC | Age: 69
End: 2019-08-01

## 2019-08-01 DIAGNOSIS — R35.0 URINARY FREQUENCY: Primary | ICD-10-CM

## 2019-08-01 DIAGNOSIS — R82.90 CLOUDY URINE: ICD-10-CM

## 2019-08-01 DIAGNOSIS — N39.0 RECURRENT UTI: ICD-10-CM

## 2019-08-01 RX ORDER — NITROFURANTOIN 25; 75 MG/1; MG/1
100 CAPSULE ORAL 2 TIMES DAILY
Qty: 14 CAPSULE | Refills: 0 | Status: CANCELLED | OUTPATIENT
Start: 2019-08-01

## 2019-08-01 NOTE — TELEPHONE ENCOUNTER
Patient calling back because she forgot to mention her low blood sugar when speaking with the nurse and would like a call back regarding her low blood sugar.

## 2019-08-01 NOTE — TELEPHONE ENCOUNTER
Patient was treated last month 7/2/19 for UTI by Dr. Strickland   Symptoms got better but is having symptoms again  Having urinary frequency and cloudy urine    Has urology appointment on 8/13/19 for recurrent UTI  Advised that appointment is needed for eval and testing to determine proper abx     Please advise  Could we just do a lab without appointment?    Harshal Hayward RN

## 2019-08-01 NOTE — TELEPHONE ENCOUNTER
Spoke to patient and she will like more refill on nitroFURantoin macrocrystal-monohydrate (MACROBID) 100 MG capsule to cure her symptoms  José Luis Selby CMA on 8/1/2019 at 2:07 PM

## 2019-08-01 NOTE — TELEPHONE ENCOUNTER
Reason for call:  Other   Patient called regarding (reason for call): call back  Additional comments: Patient calling to inform she still has her bladder infection and would like a call back.     Phone number to reach patient:  Home number on file [phone] (home)    Best Time:  any    Can we leave a detailed message on this number?  YES

## 2019-08-01 NOTE — TELEPHONE ENCOUNTER
There is no such fact. Valsartan [ Diovan ] is not linked to cancer at all. I recommend we review the recent Federal Drug Administration notice regarding valsartan [ Diovan ] . The issue had to do with a manufacturing defect and was only in certain manufacturing lots of the drugs, if Marina Valdez had received an Affected batch from her pharmacy she would have been notified.    According to my understanding the impurity has been dealt with and is no longer an issue.    I recommend she review her concerns with her pharmacist.    I also offer the suggestion of coming in to review her blood pressure and treatment options with her and if she really feels it's necessary we can replace the valsartan [ Diovan ] with a different angiotensin receptor blocker such as Olmesartan Medoxomil (BENICAR) or other prescriptions     I don't think this is necessary     Mateus Roberson MD

## 2019-08-01 NOTE — TELEPHONE ENCOUNTER
Patient is calling to discuss RX: valsartan (DIOVAN) 80 MG tablet, states saw on TV it causes cancer and therefore would like to know why provider put patient on this medication. Please call to advise. Thank you.

## 2019-08-02 NOTE — TELEPHONE ENCOUNTER
Yes we can do laboratory without appointment , order urine analysis and urine culture     Mateus Roberson MD

## 2019-08-02 NOTE — TELEPHONE ENCOUNTER
Lab order placed  Please call patient to schedule a lab only appointment for urine test    Harshal Hayward RN

## 2019-08-02 NOTE — TELEPHONE ENCOUNTER
Spoke to patient. Told her lab for urine is order and offer to schedule for lab appointment but patient said not right now, she will call back to do it  José Luis Selby CMA on 8/2/2019 at 8:22 AM

## 2019-08-05 NOTE — TELEPHONE ENCOUNTER
Called patient and she will be in on 08/13/2019 to see urology and wants to leave urine then.       Suzy العلي CMA ([facility])

## 2019-08-13 ENCOUNTER — OFFICE VISIT (OUTPATIENT)
Dept: UROLOGY | Facility: CLINIC | Age: 69
End: 2019-08-13
Payer: COMMERCIAL

## 2019-08-13 VITALS — HEART RATE: 62 BPM | RESPIRATION RATE: 25 BRPM

## 2019-08-13 DIAGNOSIS — N39.0 RECURRENT UTI: Primary | ICD-10-CM

## 2019-08-13 DIAGNOSIS — R31.0 GROSS HEMATURIA: ICD-10-CM

## 2019-08-13 PROCEDURE — 99204 OFFICE O/P NEW MOD 45 MIN: CPT | Performed by: UROLOGY

## 2019-08-13 NOTE — PROGRESS NOTES
S: Patient is a pleasant 68-year-old  female who is request be seen by Dr. Friedman for a consultation with regard to patient's recurrent new tract infections.  Patient said that since December of last year she has had several urinary tract infections.  His symptoms were dysuria, foul-smelling urine, hematuria.  She has no flank pain fever nausea or vomiting.  She denies any bowel problems.  She is not sexually active.  She has no history of kidney stones in the past.  She has diabetes but under control according to her.  She has morbid obesity.Recent urine test recent urine cultures have shown evidence of E. coli and enterococcus.  Current Outpatient Medications   Medication Sig Dispense Refill     acetaminophen (TYLENOL) 500 MG tablet Take 1-2 tablets (500-1,000 mg) by mouth 3 times daily as needed for mild pain       aspirin 81 MG tablet Take 1 tablet (81 mg) by mouth daily 100 tablet 3     B-D U/F 31G X 8 MM insulin pen needle USE FOUR TIMES DAILY AS DIRECTED WITH LANTUS AND PREMEAL INSULIN 500 each 3     blood glucose (ONETOUCH VERIO IQ) test strip TEST THREE TIMES DAILY OR AS DIRECTED 200 strip 1     blood glucose monitoring (NO BRAND SPECIFIED) meter device kit Use to test blood sugar 3 times daily or as directed. 1 kit 0     blood glucose monitoring (NO BRAND SPECIFIED) test strip Use to test blood sugars 3 times daily or as directed 300 strip 3     blood glucose monitoring (ONE TOUCH DELICA) lancets Use to test blood sugars 3 times daily or as directed. Qty of 1 box= 100 lancets 300 each 3     cholecalciferol 1000 UNITS TABS Take 1 tablet by mouth daily       [START ON 8/15/2019] conjugated estrogens (PREMARIN) 0.625 MG/GM vaginal cream Place 1 g vaginally twice a week 30 g 11     diphenhydrAMINE (BENADRYL) 25 MG tablet Take 1-2 tablets (25-50 mg) by mouth nightly as needed And 25 mg nightly as needed       fluticasone (FLONASE) 50 MCG/ACT nasal spray Spray 1-2 sprays into both nostrils daily 1  Bottle 11     insulin glargine U-300 (TOUJEO SOLOSTAR) 300 UNIT/ML injection INJECT 120 UNITS SUBCUTANEOUSLY AT BEDTIME       insulin lispro (HUMALOG KWIKPEN) 100 UNIT/ML pen Inject 15 units subcutaneous 3 times daily with meals 3 mL 3     insulin pen needle (B-D U/F) 31G X 8 MM Use 5 pen needles daily or as directed. 500 each 0     liraglutide (VICTOZA PEN) 18 MG/3ML solution Inject 1.8 mg Subcutaneous daily INJECT 1.8 MG UNDER THE SKIN DAILY 27 mL 0     nitroFURantoin macrocrystal-monohydrate (MACROBID) 100 MG capsule Take 1 capsule (100 mg) by mouth 2 times daily 14 capsule 0     nystatin (MYCOSTATIN) 310326 UNIT/GM external powder Apply to groin and under breast twice daily as needed until redness has resolved 120 g 1     order for DME Equipment being ordered: CPAP along with all associated supplies including head gear with nose mask and hose 1 Device 0     order for DME Equipment being ordered: Women's incontinence pads/liners, 3 per day 3 Month 11     order for DME Equipment being ordered: CPAP 1 Device 0     order for DME Equipment being ordered: home blood pressure cuff for home blood pressure monitoring 1 Device 0     order for DME Equipment being ordered: CPAP machine set at 15 pressure, heated humidifier, supplies: mask and tubing ( supplies) 1 Device 1     order for DME Equipment being ordered: corset to prevent compulsive skin picking of abdomen 1 Device 0     order for DME Equipment being ordered: Lift Chair 1 each 0     ORDER FOR DME Equipment being ordered: CPAP and associated supplies and tubing 1 Device 0     PAROXETINE HCL 40 MG OR TABS Take 80 mg by mouth daily. 80mg=2 tablets.        pravastatin (PRAVACHOL) 40 MG tablet TAKE 1 TABLET BY MOUTH EVERY DAY 90 tablet 3     psyllium (METAMUCIL/KONSYL) capsule Take 1 capsule by mouth daily 90 capsule 3     valACYclovir (VALTREX) 500 MG tablet TAKE 1 TABLET (500 MG) BY MOUTH DAILY 90 tablet 1     valsartan (DIOVAN) 80 MG tablet Take 1 tablet (80 mg) by  mouth daily 90 tablet 0     ziprasidone (GEODON) 80 MG capsule Take 160mg at 8am,80 mg at 10 AM, and 80 mg nightly as needed       Allergies   Allergen Reactions     Bactroban [Mupirocin] Itching     Other [Seasonal Allergies]      Hayfever     Past Medical History:   Diagnosis Date     Bipolar affect, depressed (H)      Diabetes      Endometrial cancer (H)      HTN (hypertension)      Morbid obesity due to excess calories (H)      CORDELL (obstructive sleep apnea)     uses CPAP     Renal disease      Schizophrenia (H)      Past Surgical History:   Procedure Laterality Date     cataract      bilateral     CYSTOSCOPY N/A 12/29/2016    Procedure: CYSTOSCOPY;  Surgeon: Franca Rousseau MD;  Location: [facility] OR     DAVINCI HYSTERECTOMY TOTAL, BILATERAL SALPINGO-OOPHORECTOMY, COMBINED N/A 12/29/2016    Procedure: COMBINED DAVINCI HYSTERECTOMY TOTAL, SALPINGO-OOPHORECTOMY;  Surgeon: Franca Rousseau MD;  Location: [facility] OR     LAPAROSCOPY      for endometriosis     OOPHORECTOMY      right     WRIST SURGERY      right      Family History   Problem Relation Age of Onset     Heart Disease Mother      Diabetes Mother      Hypertension Mother      Psychotic Disorder Mother         schitzophrenia     Hypertension Father      Blood Disease Father         high iron level     Heart Disease Maternal Grandmother      Cerebrovascular Disease Maternal Grandmother      Heart Disease Maternal Grandfather      Breast Cancer Paternal Grandmother      Breast Cancer Sister      Heart Disease Brother      Ovarian Cancer Paternal Aunt      Kidney Cancer Daughter      Uterine Cancer Daughter      Uterine Cancer Sister      Social History     Socioeconomic History     Marital status:      Spouse name: None     Number of children: None     Years of education: None     Highest education level: None   Occupational History     None   Social Needs     Financial resource strain: None     Food insecurity:     Worry: None     Inability:  None     Transportation needs:     Medical: None     Non-medical: None   Tobacco Use     Smoking status: Former Smoker     Packs/day: 1.00     Years: 0.00     Pack years: 0.00     Types: Cigarettes     Last attempt to quit: 1990     Years since quittin.6     Smokeless tobacco: Never Used     Tobacco comment: quit    Substance and Sexual Activity     Alcohol use: No     Drug use: No     Sexual activity: Never     Birth control/protection: Female Surgical   Lifestyle     Physical activity:     Days per week: None     Minutes per session: None     Stress: None   Relationships     Social connections:     Talks on phone: None     Gets together: None     Attends Yarsani service: None     Active member of club or organization: None     Attends meetings of clubs or organizations: None     Relationship status: None     Intimate partner violence:     Fear of current or ex partner: None     Emotionally abused: None     Physically abused: None     Forced sexual activity: None   Other Topics Concern     Parent/sibling w/ CABG, MI or angioplasty before 65F 55M? Not Asked   Social History Narrative     None       REVIEW OF SYSTEMS  =================  C: NEGATIVE for fever, chills, change in weight  I: NEGATIVE for worrisome rashes, moles or lesions  E/M: NEGATIVE for ear, mouth and throat problems  R: NEGATIVE for significant cough or SHORTNESS OF BREATH  CV:  NEGATIVE for chest pain, palpitations or peripheral edema  GI: NEGATIVE for nausea, abdominal pain, heartburn, or change in bowel habits  NEURO: NEGATIVE numbness/weakness  : see HPI  PSYCH: NEGATIVE depression/anxiety  LYmph: no new enlarged lymph nodes  Ortho: no new trauma/movements      Physical Exam:  Pulse 62   Resp 25   LMP 2010    Patient is pleasant, in no acute distress, good general condition.  Heart:  negative, PMI normal  Lung: no evidence of respiratory distress    Abdomen: Soft, nondistended, non tender. No masses. No rebound or  guarding.   Exam: No CVA tenderness  Skin: Warm and dry.  No redness.  Neuro: grossly normal  Musculaskeletal: moving all extremities  Psych normal mood and affect  Musculoskeletal  moving all extremities  Hematologic/Lymphatic/Immunologic: normal ant/post cervical, axillary, supraclavicular and inguinal nodes    Assessment/Plan:   68-year-old morbidly obese female with recent recurrent urinary tract infections.  Symptoms are mainly lower urinary tract.  I will schedule patient for CT scan stone protocol and cystoscopy next.  In terms of prevention of new tract infections in the postmenopausal females, the best treatment will be to start vaginal estrogen.  Premarin was prescribed.  Patient told me that she cannot reach her vaginal area to place estrogen cream therefore the possibility of hygiene causing these infections.

## 2019-08-13 NOTE — PATIENT INSTRUCTIONS
Your cystoscopy is scheduled 8/27/2019 @ 1:00pm. No preparation necessary. Please call  if you need to reschedule this appointment. Please complete your CT Scan sometime prior to your appointment for cystoscopy.   Please call  to schedule the CT scan at [facility], [address] [place] [address].  Please contact your insurance company to make sure the CT scan is covered under your insurance plan.       Patient Education     Cystoscopy    Cystoscopy is a procedure that lets your doctor look directly inside your urethra and bladder. It can be used to:    Help diagnose a problem with your urethra, bladder, or kidneys.    Take a sample (biopsy) of bladder or urethral tissue.    Treat certain problems (such as removing kidney stones).    Place a stent to bypass an obstruction.    Take special X-rays of the kidneys.  Based on the findings, your doctor may recommend other tests or treatments.  What is a cystoscope?  A cystoscope is a telescope-like instrument that contains lenses and fiberoptics (small glass wires that make bright light). The cystoscope may be straight and rigid, or flexible to bend around curves in the urethra. The doctor may look directly into the cystoscope, or project the image onto a monitor.  Getting ready    Ask your doctor if you should stop taking any medicines before the procedure.    Ask whether you should avoid eating or drinking anything after midnight before the procedure.    Follow any other instructions your doctor gives you.  Tell your doctor before the exam if you:    Take any medicines, such as aspirin or blood thinners    Have allergies to any medicines    Are pregnant   The procedure  Cystoscopy is done in the doctor s office, surgery center, or hospital. The doctor and a nurse are present during the procedure. It takes only a few minutes, longer if a biopsy, X-ray, or treatment needs to be done.  During the procedure:    You lie on an exam  table on your back, knees bent and legs apart. You are covered with a drape.    Your urethra and the area around it are washed. Anesthetic jelly may be applied to numb the urethra. Other pain medicine is usually not needed. In some cases, you may be offered a mild sedative to help you relax. If a more extensive procedure is to be done, such as a biopsy or kidney stone removal, general anesthesia may be needed.    The cystoscope is inserted. A sterile fluid is put into the bladder to expand it. You may feel pressure from this fluid.    When the procedure is done, the cystoscope is removed.  After the procedure  If you had a sedative, general anesthesia, or spinal anesthesia, you must have someone drive you home. Once you re home:    Drink plenty of fluids.    You may have burning or light bleeding when you urinate--this is normal.    Medicines may be prescribed to ease any discomfort or prevent infection. Take these as directed.    Call your doctor if you have heavy bleeding or blood clots, burning that lasts more than a day, a fever over 100 F  (38  C), or trouble urinating.  Date Last Reviewed: 1/1/2017 2000-2018 [employer]. [address] [place] [address], [place] [address]. All rights reserved. This information is not intended as a substitute for professional medical care. Always follow your healthcare professional's instructions.

## 2019-08-14 ENCOUNTER — TELEPHONE (OUTPATIENT)
Dept: FAMILY MEDICINE | Facility: CLINIC | Age: 69
End: 2019-08-14

## 2019-08-14 RX ORDER — ESTRADIOL 0.1 MG/G
2 CREAM VAGINAL
Qty: 42.5 G | Refills: 3 | Status: SHIPPED | OUTPATIENT
Start: 2019-08-15

## 2019-08-14 NOTE — TELEPHONE ENCOUNTER
"Per patient, she has had SOB on exertion for half a year now and wondering if she should get oxygen  Denies any other symptoms along with the SOB such as CP  But does report that she has had a cough for a \"very long time\" now   Advised her to be seen for further eval and testing  She stated that she has too many appointments coming up and will schedule an OV for her SOB on her own when she is less busy with other appointments    Harshal Hayward RN    "

## 2019-08-19 ENCOUNTER — TELEPHONE (OUTPATIENT)
Dept: FAMILY MEDICINE | Facility: CLINIC | Age: 69
End: 2019-08-19

## 2019-08-19 NOTE — TELEPHONE ENCOUNTER
Diabetes Education Scheduling Outreach #1:    Call to patient to schedule. Patient declined to schedule at this time. She is in a lot of pain. She needs to wait a little bit before scheduling because she cannot get around. She understands that she needs to get in but stated this has to wait. Provided number for scheduling when she is ready to schedule.    Marlin Castro  [facility]  Diabetes and Nutrition Scheduling

## 2019-08-20 ENCOUNTER — TELEPHONE (OUTPATIENT)
Dept: INTERNAL MEDICINE | Facility: CLINIC | Age: 69
End: 2019-08-20

## 2019-08-20 NOTE — TELEPHONE ENCOUNTER
I called patient at 1:53 , to try to answer patients questions. She has possibly got a urinary tract infection but she is unwilling and/or unable to leave a urine analysis .    She feels out of breath with exercise but is unwilling and/or unable to come to clinic for an appointment or to go to the emergency room     We spoke in circles , patient acknowledged her schizophrenia and that her mental health issues do impact her abilities to describe the issues     I comment that throughout our nearly 10 minutes of conversation , at no time did she seem dyspneic to me, and was easily talking in complete sentences.    We concluded that she is going to see how it goes , has a cystoscopy scheduled with Dr. Adamson in a week and she will consider making an appointment to see me either this Thursday or Friday or later, and her RN is dropping in to assess her tomorrow     Mateus Roberson MD

## 2019-08-20 NOTE — TELEPHONE ENCOUNTER
Addend Delete Tag Copy   Ashanti Whitfield   Non-Clinical      Telephone Encounter   Signed   Creation Time:  8/20/2019 11:42 AM                  Patient is calling because she has a bladder infection and says she can not get in to be seen. Please call back at [phone]

## 2019-08-20 NOTE — TELEPHONE ENCOUNTER
Spoke with pt. States she has had the symptoms since the last medication. 3 days later she had it again. Has an odor and cloudiness in the toilet. Had pain with urination last night. Does not have pain today. Is experiencing frequency. Has chills. No fever, but has not measured temp. No hematuria. Had to cancel with urology. Is too short of air to do a CT scan and the other thing they were going to do. So out of air can't get to the appt. Can't get to pulmonary either. Is afraid she is going to get lost.   Tried to assist her with an appt and she said she is out of air, she can't even come in here. Asked if she is having SOB at rest and she said when just laying in her chair is not short of breath. Short of breath with exertion. Asked if pt would go to the hospital and she wasn't sure what she should do and requested message be sent to PCP.    Stacey Alexander RN  [facility] [place]

## 2019-09-03 ENCOUNTER — TELEPHONE (OUTPATIENT)
Dept: FAMILY MEDICINE | Facility: CLINIC | Age: 69
End: 2019-09-03

## 2019-09-03 NOTE — LETTER
September 3, 2019          Marina Valdez,  [address]  [address]  [place] [facility] [address]        Dear Lorie Hvinden      Monitoring and managing your preventative and chronic health conditions are very important to us. Our records indicate that you have not scheduled for Appointment with your provider for a blood pressure check which was recommended by Dr. Roberson.      If you have received your health care elsewhere, please call the clinic so the information can be documented in your chart.    Please call [phone] or message us through your D[employer] account to schedule an appointment or provide information for your chart.     Feel free to contact us if you have any questions or concerns!    I look forward to seeing you and working with you on your health care needs.     Sincerely,       Your [facility] Team/Suzy العلي CMA ([facility])

## 2019-09-03 NOTE — TELEPHONE ENCOUNTER
Panel Management Review      Patient has the following on her problem list:     Hypertension   Last three blood pressure readings:  BP Readings from Last 3 Encounters:   07/02/19 110/60   05/07/19 108/60   04/01/19 132/84     Blood pressure: FAILED, unable to obtain last bp on 08/13/2019    HTN Guidelines:  Less than 140/90      Composite cancer screening  Chart review shows that this patient is due/due soon for the following None  Summary:    Patient is due/failing the following:   Wellness, pneumo, bmp, flu     Action needed:   Patient needs office visit for blood pressure check.    Type of outreach:    Sent letter.    Questions for provider review:    None                                                                                                                                    Suzy العلي CMA ([facility])        Chart routed to none .

## 2019-09-03 NOTE — LETTER
Dear Marina Valdez      Monitoring and managing your preventative and chronic health conditions are very important to us. Our records indicate that you have not scheduled for Appointment with your provider for a blood pressure check which was recommended by Dr. Roberson.      If you have received your health care elsewhere, please call the clinic so the information can be documented in your chart.    Please call [phone] or message us through your [employer] account to schedule an appointment or provide information for your chart.     Feel free to contact us if you have any questions or concerns!    I look forward to seeing you and working with you on your health care needs.     Sincerely,       Your [facility] Team/Suzy العلي CMA ([facility])

## 2019-09-04 ENCOUNTER — PATIENT OUTREACH (OUTPATIENT)
Dept: GERIATRIC MEDICINE | Facility: CLINIC | Age: 69
End: 2019-09-04

## 2019-09-04 NOTE — PROGRESS NOTES
"[facility] Care Coordination Contact      [facility] Refusal Telephone Assessment    Member refused home visit HRA on 9/4/19 (reason: mbr stated \"I have everything I need right now\" and declined to have a home visit with CC since she is getting CADI waiver services).    ER visits: No  Hospitalizations: No  Health concerns: None at this time.  Falls/Injuries: No  ADL/IADL Dependencies: shopping, meal prep, housekeeping,         Member currently receiving the following home care services:   HMK, Home delivered meals, lifeline  Member currently receiving the following community resources:    Informal support(s):  family    Advanced Care Planning discussion, complete code section.    [facility] Health Plan sponsored benefits: Shared information re: Silver Sneakers/gym memberships, ASA, Calcium +D.    Follow-Up Plan: Member informed of future contact, plan to f/u with member with a 6 month telephone assessment and offer a home visit.  Contact information shared with member and family, encouraged member to call with any questions or concerns at any time.    Requested CMS to mail refusal letter.    REGINA Grier  [facility]  [phone]      "

## 2019-09-05 NOTE — PROGRESS NOTES
[facility] Care Coordination Contact    CC placed a call to mbr and left vm requesting CADI CM's contact info.    REGINA Grier  [facility]  [phone]

## 2019-09-16 ENCOUNTER — PATIENT OUTREACH (OUTPATIENT)
Dept: GERIATRIC MEDICINE | Facility: CLINIC | Age: 69
End: 2019-09-16

## 2019-09-16 NOTE — PROGRESS NOTES
[facility] Care Coordination Contact      Member Name:  Marina Valdez O Name:  Marylou O/Health Plan Member ID#: [id number]   Product: [facility] Care Coordinator Contact:  REGINA Grier Agency/County/Care System: [facility]   Transition Communication Actions from Care Management Contact   Transition #1   Notification Date: 9/16/19 Transition Date:   9/10/19 Transition From: Home     Is this the member s usual care setting?               yes Transition To: Hospital, [facility]   Transition Type:  Unplanned  Reason for Admission/Comments:  [facility] Care Coordination Contact    CC received notification of Hospital admission.  Hospital admission occurred on 9/10/19 at [facility]  with Dx of difficulty breathing, fever, diarrhea, and nausua.  CC contacted Hospital /discharge planner (Colleen, [phone]) and left a message with this CC contact information, reviewed community POC as well requested to be notified of concerns, care conferences and discharge planning.  CC reached out to member regarding transition and left a message requesting a return call.  Reviewed and update care plan as needed.  Notified community service providers and placed services Homemaking Meals Supplies: incontinence on hold as needed.  Transition log initiated.   PCP notified of hospitalization via EMR.       Shared CC contact info, care plan/services with receiving setting--Date completed: 9/16/19   Notified PCP of transition--Date completed:  9/16/19     via  EMR   Transition #2   Transition #3  (if applicable)   Notification Date: 9/18/19         Transition To:  Skilled Nursing Facility, [facility]  Transition Date: 9/18/19     Transition Type:    Planned  Notified PCP -- Date completed: 9/18/19              Shared CC contact info, care plan/services with receiving setting or, if applicable, home care agency--Date completed:  9/18/19  *Complete additional tasks below, if this  transition is a return to usual care setting.      Comments:  Notified that mbr discharge to [facility].      *Complete tasks below when the member is discharging TO their usual care setting within one (1) business day of notification.  For situations where the Care Coordinator is notified of the discharge prior to the date of discharge, the Care Coordinator must follow up with the member or designated representative to confirm that discharge actually occurred and discuss required JOSÉ MIGUEL tasks as outlined in the JOSÉ MIGUEL Instructions.  (This includes situations where it may be a  new  usual care setting for the member. (i.e., a community member who decides upon permanent nursing home placement following hospitalization and rehab).    Date completed: 9/16/19  Communicated with member or their designated representative about the following:  care transition process; about changes to the member s health status; plan of care updates; education about transitions and how to prevent unplanned transitions/readmissions  Four Pillars for Optimal Transition:    Check  Yes  - if the member, family member and/or SNF/facility staff manages the following:    If  No  provide explanation in the comments section.          [x]  Yes     []  No     Does the member have a follow-up appointment scheduled with primary care or specialist? (Mental health hospitalizations--the appt. should be w/in 7 days)   [x]  Yes     []  No     Can the member manage their medications or is there a system in place to manage medications (e.g. home care set-up)?         [x]  Yes     []  No     Can the member verbalize warning signs and symptoms to watch for and how to respond?         [x]  Yes     []  No     Does the member use a Personal Health Care Record?  Check  Yes  if visit summary, discharge summary, and/or healthcare summary are being used as a PHR.                                                                                                                                                                                     [x] Yes      [] No      Have you updated the member s care plan?  If  No  provide explanation in comments.   Comments:       REGINA Grier  [facility]  [phone]

## 2019-09-23 ENCOUNTER — PATIENT OUTREACH (OUTPATIENT)
Dept: GERIATRIC MEDICINE | Facility: CLINIC | Age: 69
End: 2019-09-23

## 2019-09-23 NOTE — PROGRESS NOTES
[facility] Care Coordination Contact    CC received a forwarded email from previous CC that was sent by mbr's SYD RAMIREZ regarding mbr's recent transition to [facility].  SYD Ramirez informed that she met with mbr and that mbr reported she has accepted her prognosis and that CADI waiver is now closed.      REGINA Grier  [facility]  [phone]

## 2019-09-24 ENCOUNTER — PATIENT OUTREACH (OUTPATIENT)
Dept: GERIATRIC MEDICINE | Facility: CLINIC | Age: 69
End: 2019-09-24

## 2019-09-24 NOTE — PROGRESS NOTES
[facility] Care Coordination Contact    CC made contact with YADIRA Srinivasan at [facility], to discuss mbr's transition to long term care.  Zarina confirmed that mbr will remain there for long term care.  CC informed that mbr's case would now be transferred to new  and that CADI waiver has now been closed.  Zarina stated that mbr will be followed by a provider from [facility].      REGINA Grier  [facility]  [phone]

## 2019-09-24 NOTE — PROGRESS NOTES
[facility] Care Coordination Contact    No longer active with [facility] community case management effective 9/18/19.  Reason for community disenrollment: Move to LT.    5181 sent to [facility].  SYD RAMIREZ informed that she has closed out CADI waiver.      REGINA Grier  [facility]  [phone]

## 2019-09-26 ENCOUNTER — MEDICAL CORRESPONDENCE (OUTPATIENT)
Dept: HEALTH INFORMATION MANAGEMENT | Facility: CLINIC | Age: 69
End: 2019-09-26

## 2019-09-27 RX ORDER — LIRAGLUTIDE 6 MG/ML
INJECTION SUBCUTANEOUS
Qty: 3 ML | Refills: 3 | Status: SHIPPED | OUTPATIENT
Start: 2019-09-27

## 2019-09-27 NOTE — TELEPHONE ENCOUNTER
MA - call patient and schedule a diabetic follow up then reroute to RN refill donovan Calderon RN - BC

## 2019-09-27 NOTE — TELEPHONE ENCOUNTER
Not entirely clear to me Liraglutide [ Victoza ] is actually needed, medication refill     Mateus Roberson MD

## 2019-09-27 NOTE — TELEPHONE ENCOUNTER
Called patient and daughter answered. MA informed her its a call from [place]. She expressed frustrating and stated that the patient is in hospice and is dying.   Stacey CROFT CMA ([facility])

## 2019-10-09 NOTE — PROGRESS NOTES
Member has not transferred out from [facility].  Will update with eff date of transfer when notified by [facility].    Disenrollment process started.    Marina Selyb  Care Management Specialist  [facility]  [phone]

## 2019-11-05 NOTE — PROGRESS NOTES
No longer active with [facility] community case management effective 10/01/2019 retro per [facility] verification site.    Reason for community disenrollment: Change Care System/PCC to [facility].    UTF sent to [facility] to forward.    Marina Selby  Care Management Specialist  [facility]  [phone]

## 2020-01-16 ENCOUNTER — TELEPHONE (OUTPATIENT)
Dept: INTERNAL MEDICINE | Facility: CLINIC | Age: 70
End: 2020-01-16

## 2020-01-16 NOTE — LETTER
January 20, 2020          Marina Valdez,  [address]  [address]  [place] [facility] [address]        Dear Marina Valdez      Monitoring and managing your preventative and chronic health conditions are very important to us. Our records indicate that you have not scheduled for Mammogram  which was recommended by Dr. Roberson      If you have received your health care elsewhere, please call the clinic so the information can be documented in your chart.    Please call [phone] or message us through your [employer]Stage account to schedule an appointment or provide information for your chart.     Feel free to contact us if you have any questions or concerns!    I look forward to seeing you and working with you on your health care needs.     Sincerely,       Your [facility] Team/LS

## 2020-02-11 ENCOUNTER — TELEPHONE (OUTPATIENT)
Dept: INTERNAL MEDICINE | Facility: CLINIC | Age: 70
End: 2020-02-11

## 2020-02-11 NOTE — LETTER
February 11, 2020          Marina Valdez,  [address]  [address]  [place] [facility] [address]        Dear Marina Valdez      Monitoring and managing your preventative and chronic health conditions are very important to us. Our records indicate that you have not scheduled for Appointment with your provider, Mammogram, Colonoscopy, Tdap, Diabetic Check and HgbA1C  which was recommended by Dr. Roberson.      If you have received your health care elsewhere, please call the clinic so the information can be documented in your chart.    Please call [phone] or message us through your Eventdoo account to schedule an appointment or provide information for your chart.     Feel free to contact us if you have any questions or concerns!    I look forward to seeing you and working with you on your health care needs.     Sincerely,       Your [facility] Team/Suzy العلي CMA ([facility])

## 2020-02-11 NOTE — TELEPHONE ENCOUNTER
Panel Management Review      Patient has the following on her problem list:     Diabetes    ASA: Passed    Last A1C  Lab Results   Component Value Date    A1C 6.8 02/04/2019    A1C 10.0 08/31/2018    A1C 10.4 10/17/2017    A1C 8.4 07/10/2017    A1C 7.1 04/18/2017     A1C tested: Passed    Last LDL:    Lab Results   Component Value Date    CHOL 84 02/04/2019     Lab Results   Component Value Date    HDL 44 02/04/2019     Lab Results   Component Value Date    LDL 26 02/04/2019     Lab Results   Component Value Date    TRIG 71 02/04/2019     Lab Results   Component Value Date    CHOLHDLRATIO 2.2 07/09/2015     Lab Results   Component Value Date    NHDL 40 02/04/2019       Is the patient on a Statin? YES             Is the patient on Aspirin? YES    Medications     HMG CoA Reductase Inhibitors     pravastatin (PRAVACHOL) 40 MG tablet       Salicylates     aspirin 81 MG tablet             Last three blood pressure readings:  BP Readings from Last 3 Encounters:   07/02/19 110/60   05/07/19 108/60   04/01/19 132/84       Date of last diabetes office visit: 03/18/2019     Tobacco History:     History   Smoking Status     Former Smoker     Packs/day: 1.00     Years: 0.00     Types: Cigarettes     Quit date: 1/1/1990   Smokeless Tobacco     Never Used     Comment: quit 1990         Hypertension   Last three blood pressure readings:  BP Readings from Last 3 Encounters:   07/02/19 110/60   05/07/19 108/60   04/01/19 132/84     Blood pressure: FAILED    HTN Guidelines:  Less than 140/90      Composite cancer screening  Chart review shows that this patient is due/due soon for the following Mammogram and Colonoscopy  Summary:    Patient is due/failing the following:   Urine drug, eye exam, wellness, pneumo, bmp, a1c, microalbumin, foot exam, phq2, fall risk, lipid, hemoglobin, dtap, COLONOSCOPY and MAMMOGRAM    Action needed:   Patient needs office visit for diabetes check up, general check up.    Type of outreach:    Sent  letter.    Questions for provider review:    None                                                                                                                                    Suzy العلي CMA ([facility])        Chart routed to none .

## 2022-02-17 PROBLEM — Z76.89 HEALTH CARE HOME: Status: RESOLVED | Noted: 2017-12-12 | Resolved: 2019-10-02

## 2022-10-10 NOTE — TELEPHONE ENCOUNTER
Form completed and mailed to patient's home address.  Patient called and informed. Camlia Rudolph,      Consent 3/Introductory Paragraph: I gave the patient a chance to ask questions they had about the procedure.  Following this I explained the Mohs procedure and consent was obtained. The risks, benefits and alternatives to therapy were discussed in detail. Specifically, the risks of infection, scarring, bleeding, prolonged wound healing, incomplete removal, allergy to anesthesia, nerve injury and recurrence were addressed. Prior to the procedure, the treatment site was clearly identified and confirmed by the patient. All components of Universal Protocol/PAUSE Rule completed.

## 2023-02-08 NOTE — PATIENT INSTRUCTIONS
Follow up in the next 2 weeks if symptoms have not improved after using triamcinolone (KENALOG) cream     [facility]    If you have any questions regarding to your visit please contact your care team:     Team Pink:   Clinic Hours Telephone Number   Internal Medicine:  Dr. Jeanie Decker NP 7am-7pm  Monday - Thursday   7am-5pm  Fridays  [phone]  (Appointment scheduling available 24/7)   Urgent [place] - [place] - 11am-9pm Monday-Friday Saturday-Sunday- 9am-5pm   [place] - 5pm-9pm Monday-Friday Saturday-Sunday- 9am-5pm  [phone] - [place]  [phone] - [place]       What options do I have for a visit other than an office visit? We offer electronic visits (e-visits) and telephone visits, when medically appropriate.  Please check with your medical insurance to see if these types of visits are covered, as you will be responsible for any charges that are not paid by your insurance.      You can use [employer] (secure electronic communication) to access to your chart, send your primary care provider a message, or make an appointment. Ask a team member how to get started.     For a price quote for your services, please call our Consumer Price Line at [phone] or our Imaging Cost estimation line at [phone] (for imaging tests).  Suzy POWELL CMA ([facility])    
no

## 2024-03-05 NOTE — TELEPHONE ENCOUNTER
Reason for Call: Request for an order or referral:    Order or referral being requested: Patient asking for an order for Pads to be sent to [address] Fax # [phone]    Date needed: as soon as possible    Has the patient been seen by the PCP for this problem? YES    Additional comments: NA    Phone number Patient can be reached at:  Home number on file [phone] (home)    Best Time:  Any time    Can we leave a detailed message on this number?  YES    Call taken on 6/28/2018 at 12:56 PM by Michelle Quinn     Resume activity as tolerated

## 2025-06-11 NOTE — TELEPHONE ENCOUNTER
Noted  Patient was advised to f/u during previous phone call    No call back needed as provider agreed OV needed    Harshal Hayward RN    
Patient was seen at [facility] ED on 2/12/19 for Acute cystitis with hematuria and treated with abx and was to f/u with PCP in 3-5 days  Patient has not scheduled    Per patient, she still sees a faint amount of blood in her urine  Just finished her abx.  Denies any fever or pain  She declined to schedule an appointment at this time  She stated that she will call back to schedule on her own    Routing to provider  Please advise if this needs to be addressed in an OV or if you have any other recommendations    Harshal Hayward RN    
Reason for call:  Patient reporting a symptom    Symptom or request:  Still has blood in her urine    Duration (how long have symptoms been present):  18 days     Have you been treated for this before? Yes    Additional comments:  Treated twice     Phone Number patient can be reached at:  Home number on file [phone] (home)    Best Time:   Any     Can we leave a detailed message on this number:  YES    Call taken on 2/25/2019 at 11:18 AM by Daisy Ramos  
Recommended to schedule follow up with me and we can at that time collect a recheck urine analysis and urine culture     Mateus Roberson MD    
Render Risk Assessment In Note?: no
Detail Level: Simple
Additional Notes: Pt had laser hair removal done previously around the biopsy site.